# Patient Record
Sex: FEMALE | Race: WHITE | Employment: OTHER | URBAN - METROPOLITAN AREA
[De-identification: names, ages, dates, MRNs, and addresses within clinical notes are randomized per-mention and may not be internally consistent; named-entity substitution may affect disease eponyms.]

---

## 2017-05-03 ENCOUNTER — HOSPITAL ENCOUNTER (OUTPATIENT)
Dept: WOMENS IMAGING | Age: 77
Discharge: HOME | End: 2017-05-03
Attending: GENERAL PRACTICE | Admitting: GENERAL PRACTICE

## 2017-05-03 DIAGNOSIS — Z13.820 SCREENING FOR OSTEOPOROSIS: ICD-10-CM

## 2017-05-03 DIAGNOSIS — Z12.31 VISIT FOR SCREENING MAMMOGRAM: ICD-10-CM

## 2018-11-25 ENCOUNTER — APPOINTMENT (OUTPATIENT)
Dept: GENERAL RADIOLOGY | Age: 78
End: 2018-11-25
Payer: MEDICARE

## 2018-11-25 ENCOUNTER — HOSPITAL ENCOUNTER (EMERGENCY)
Age: 78
Discharge: HOME | End: 2018-11-25
Attending: EMERGENCY MEDICINE
Payer: MEDICARE

## 2018-11-25 ENCOUNTER — APPOINTMENT (OUTPATIENT)
Dept: CT IMAGING | Age: 78
End: 2018-11-25
Payer: MEDICARE

## 2018-11-25 VITALS
RESPIRATION RATE: 15 BRPM | DIASTOLIC BLOOD PRESSURE: 89 MMHG | HEART RATE: 70 BPM | BODY MASS INDEX: 17.83 KG/M2 | WEIGHT: 107 LBS | OXYGEN SATURATION: 95 % | TEMPERATURE: 98.2 F | SYSTOLIC BLOOD PRESSURE: 148 MMHG | HEIGHT: 65 IN

## 2018-11-25 DIAGNOSIS — R41.0 CONFUSION: Primary | ICD-10-CM

## 2018-11-25 LAB
ALBUMIN SERPL-MCNC: 4.1 GM/DL (ref 3.4–5)
ALCOHOL SCREEN SERUM: <0.01 %WT/VOL
ALP BLD-CCNC: 64 IU/L (ref 40–129)
ALT SERPL-CCNC: 36 U/L (ref 10–40)
AMMONIA: 19 UMOL/L (ref 11–51)
ANION GAP SERPL CALCULATED.3IONS-SCNC: 12 MMOL/L (ref 4–16)
AST SERPL-CCNC: 49 IU/L (ref 15–37)
BACTERIA: ABNORMAL /HPF
BASOPHILS ABSOLUTE: 0 K/CU MM
BASOPHILS RELATIVE PERCENT: 0.6 % (ref 0–1)
BILIRUB SERPL-MCNC: 0.1 MG/DL (ref 0–1)
BILIRUBIN URINE: NEGATIVE MG/DL
BLOOD, URINE: NEGATIVE
BUN BLDV-MCNC: 17 MG/DL (ref 6–23)
CALCIUM SERPL-MCNC: 8.6 MG/DL (ref 8.3–10.6)
CHLORIDE BLD-SCNC: 100 MMOL/L (ref 99–110)
CLARITY: CLEAR
CO2: 27 MMOL/L (ref 21–32)
COLOR: COLORLESS
CREAT SERPL-MCNC: 0.9 MG/DL (ref 0.6–1.1)
DIFFERENTIAL TYPE: ABNORMAL
EOSINOPHILS ABSOLUTE: 0 K/CU MM
EOSINOPHILS RELATIVE PERCENT: 0.2 % (ref 0–3)
GFR AFRICAN AMERICAN: >60 ML/MIN/1.73M2
GFR NON-AFRICAN AMERICAN: >60 ML/MIN/1.73M2
GLUCOSE BLD-MCNC: 96 MG/DL (ref 70–99)
GLUCOSE, URINE: NEGATIVE MG/DL
HCT VFR BLD CALC: 38.9 % (ref 37–47)
HEMOGLOBIN: 11.9 GM/DL (ref 12.5–16)
IMMATURE NEUTROPHIL %: 0.6 % (ref 0–0.43)
KETONES, URINE: NEGATIVE MG/DL
LEUKOCYTE ESTERASE, URINE: NEGATIVE
LYMPHOCYTES ABSOLUTE: 1.1 K/CU MM
LYMPHOCYTES RELATIVE PERCENT: 21.7 % (ref 24–44)
MCH RBC QN AUTO: 30.1 PG (ref 27–31)
MCHC RBC AUTO-ENTMCNC: 30.6 % (ref 32–36)
MCV RBC AUTO: 98.5 FL (ref 78–100)
MONOCYTES ABSOLUTE: 0.5 K/CU MM
MONOCYTES RELATIVE PERCENT: 9.7 % (ref 0–4)
NITRITE URINE, QUANTITATIVE: NEGATIVE
NUCLEATED RBC %: 0 %
PDW BLD-RTO: 14 % (ref 11.7–14.9)
PH, URINE: 7 (ref 5–8)
PLATELET # BLD: 245 K/CU MM (ref 140–440)
PMV BLD AUTO: 10.4 FL (ref 7.5–11.1)
POTASSIUM SERPL-SCNC: 3.3 MMOL/L (ref 3.5–5.1)
PROTEIN UA: NEGATIVE MG/DL
RBC # BLD: 3.95 M/CU MM (ref 4.2–5.4)
RBC URINE: <1 /HPF (ref 0–6)
SEGMENTED NEUTROPHILS ABSOLUTE COUNT: 3.5 K/CU MM
SEGMENTED NEUTROPHILS RELATIVE PERCENT: 67.2 % (ref 36–66)
SODIUM BLD-SCNC: 139 MMOL/L (ref 135–145)
SPECIFIC GRAVITY UA: 1 (ref 1–1.03)
SQUAMOUS EPITHELIAL: <1 /HPF
TOTAL IMMATURE NEUTOROPHIL: 0.03 K/CU MM
TOTAL NUCLEATED RBC: 0 K/CU MM
TOTAL PROTEIN: 7 GM/DL (ref 6.4–8.2)
TRANSITIONAL EPITHELIAL: <1 /HPF
TRICHOMONAS: ABNORMAL /HPF
UROBILINOGEN, URINE: NORMAL MG/DL (ref 0.2–1)
WBC # BLD: 5.3 K/CU MM (ref 4–10.5)
WBC UA: <1 /HPF (ref 0–5)

## 2018-11-25 PROCEDURE — G0480 DRUG TEST DEF 1-7 CLASSES: HCPCS

## 2018-11-25 PROCEDURE — 93010 ELECTROCARDIOGRAM REPORT: CPT | Performed by: INTERNAL MEDICINE

## 2018-11-25 PROCEDURE — 93005 ELECTROCARDIOGRAM TRACING: CPT | Performed by: EMERGENCY MEDICINE

## 2018-11-25 PROCEDURE — 99284 EMERGENCY DEPT VISIT MOD MDM: CPT

## 2018-11-25 PROCEDURE — 81001 URINALYSIS AUTO W/SCOPE: CPT

## 2018-11-25 PROCEDURE — 80053 COMPREHEN METABOLIC PANEL: CPT

## 2018-11-25 PROCEDURE — 82140 ASSAY OF AMMONIA: CPT

## 2018-11-25 PROCEDURE — 71045 X-RAY EXAM CHEST 1 VIEW: CPT

## 2018-11-25 PROCEDURE — 85025 COMPLETE CBC W/AUTO DIFF WBC: CPT

## 2018-11-25 PROCEDURE — 70450 CT HEAD/BRAIN W/O DYE: CPT

## 2018-11-25 RX ORDER — ORPHENADRINE CITRATE 30 MG/ML
30 INJECTION INTRAMUSCULAR; INTRAVENOUS EVERY 12 HOURS
Status: ON HOLD | COMMUNITY
End: 2022-05-28 | Stop reason: CLARIF

## 2018-11-25 RX ORDER — LEVOTHYROXINE SODIUM 0.05 MG/1
100 TABLET ORAL DAILY
Status: ON HOLD | COMMUNITY
End: 2022-05-28 | Stop reason: CLARIF

## 2018-11-25 RX ORDER — AMLODIPINE BESYLATE 5 MG/1
5 TABLET ORAL DAILY
Status: ON HOLD | COMMUNITY
End: 2022-05-28 | Stop reason: CLARIF

## 2018-11-25 NOTE — ED NOTES
.Discharge instructions given and reviewed. Signature obtained. Patient instructed to return if symptoms get worse or any new problems or discomforts arise. No further questions at this time.      Naomi Irizarry RN  11/25/18 1777

## 2018-11-25 NOTE — ED PROVIDER NOTES
the absence of a radiologist:  [x] Radiologist's Report Reviewed:    EKG (if obtained): (All EKG's are interpreted by myself in the absence of a cardiologist)  12 lead EKG as interpreted by me reveals normal sinus rhythm. LAD. There are no ischemic ST elevations or other suspicious ST changes;  QRS interval is narrow, QT interval is not prolonged. Final interpretation: Normal sinus rhythm. LAD. MDM:  Plan of care is discussed thoroughly with the patient and family if present. If performed, all imaging and lab work also discussed with patient. All relevant prior results and chart reviewed if available. Patient presents with changes in mental status, confusion. She is disoriented to time especially but has when her thought process and can carry on a normal conversation. She has no other acute complaints. Neurologic exam is unremarkable. Plan to evaluate for metabolic abnormalities, central neurologic process, infectious etiology. On reevaluation, the patient states that she is feeling better. No change in neurologic status. Vital signs are normal.  Metabolic workup is unremarkable. CT head without any acute abnormalities. No evidence of UTI. I discussed this case with Dr. Jayashree Santana this patient is wanting to go home. He states that outpatient workup is appropriate for this patient and she can follow-up with Dr. Kristopher Sanchez tomorrow. Patient agreeable with this plan of care. Clinical Impression:  1.  Confusion      (Please note that portions of this note may have been completed with a voice recognition program. Efforts were made to edit the dictations but occasionally words are mis-transcribed.)    Shawn Nicole MD, Shi Matthews MD  11/25/18 8159

## 2018-11-28 LAB
EKG ATRIAL RATE: 72 BPM
EKG DIAGNOSIS: NORMAL
EKG P AXIS: 42 DEGREES
EKG P-R INTERVAL: 158 MS
EKG Q-T INTERVAL: 412 MS
EKG QRS DURATION: 76 MS
EKG QTC CALCULATION (BAZETT): 451 MS
EKG R AXIS: -37 DEGREES
EKG T AXIS: 29 DEGREES
EKG VENTRICULAR RATE: 72 BPM

## 2019-01-16 ENCOUNTER — HOSPITAL ENCOUNTER (OUTPATIENT)
Age: 79
Discharge: HOME | End: 2019-01-16
Payer: MEDICARE

## 2020-10-06 ENCOUNTER — TELEPHONE (OUTPATIENT)
Dept: CARDIOLOGY CLINIC | Age: 80
End: 2020-10-06

## 2020-10-06 NOTE — TELEPHONE ENCOUNTER
Called patient to notify of STANT[facility] scheduled with Dr. Pedrito Monge for 10/15 @ 10 AM, arrival @ 9 AM.    Patient will come in for COVID and sign consents on Monday 10/12 @ 130 PM.    Patient voiced understanding.

## 2020-10-12 ENCOUNTER — TELEPHONE (OUTPATIENT)
Dept: CARDIOLOGY CLINIC | Age: 80
End: 2020-10-12

## 2020-10-12 NOTE — TELEPHONE ENCOUNTER
Called patient after missing COVID appointment for STANTON later this week. Patient states her  has double pneumonia and she is wanting to cancel until his gets better. Patient states she will call to reschedule. Notified Dr. Aleah Clifton and [address] scheduling.

## 2022-05-28 ENCOUNTER — APPOINTMENT (OUTPATIENT)
Dept: CT IMAGING | Age: 82
DRG: 287 | End: 2022-05-28
Payer: MEDICARE

## 2022-05-28 ENCOUNTER — HOSPITAL ENCOUNTER (INPATIENT)
Age: 82
LOS: 3 days | Discharge: HOME | DRG: 287 | End: 2022-05-31
Attending: EMERGENCY MEDICINE | Admitting: GENERAL PRACTICE
Payer: MEDICARE

## 2022-05-28 ENCOUNTER — APPOINTMENT (OUTPATIENT)
Dept: GENERAL RADIOLOGY | Age: 82
DRG: 287 | End: 2022-05-28
Payer: MEDICARE

## 2022-05-28 DIAGNOSIS — J90 PLEURAL EFFUSION: ICD-10-CM

## 2022-05-28 DIAGNOSIS — R79.89 ELEVATED BRAIN NATRIURETIC PEPTIDE (BNP) LEVEL: ICD-10-CM

## 2022-05-28 DIAGNOSIS — R77.8 TROPONIN LEVEL ELEVATED: ICD-10-CM

## 2022-05-28 DIAGNOSIS — R06.00 DYSPNEA, UNSPECIFIED TYPE: Primary | ICD-10-CM

## 2022-05-28 LAB
ALBUMIN SERPL-MCNC: 4 GM/DL (ref 3.4–5)
ALP BLD-CCNC: 70 IU/L (ref 40–129)
ALT SERPL-CCNC: 21 U/L (ref 10–40)
ANION GAP SERPL CALCULATED.3IONS-SCNC: 11 MMOL/L (ref 4–16)
APTT: 27.2 SECONDS (ref 25.1–37.1)
AST SERPL-CCNC: 29 IU/L (ref 15–37)
BASE EXCESS MIXED: 0.9 (ref 0–2.3)
BASOPHILS ABSOLUTE: 0.1 K/CU MM
BASOPHILS ABSOLUTE: 0.1 K/CU MM
BASOPHILS RELATIVE PERCENT: 0.6 % (ref 0–1)
BASOPHILS RELATIVE PERCENT: 0.6 % (ref 0–1)
BILIRUB SERPL-MCNC: 0.4 MG/DL (ref 0–1)
BUN BLDV-MCNC: 18 MG/DL (ref 6–23)
CALCIUM SERPL-MCNC: 8.9 MG/DL (ref 8.3–10.6)
CHLORIDE BLD-SCNC: 102 MMOL/L (ref 99–110)
CO2: 24 MMOL/L (ref 21–32)
COMMENT: ABNORMAL
CREAT SERPL-MCNC: 0.6 MG/DL (ref 0.6–1.1)
D DIMER: 242 NG/ML(DDU)
DIFFERENTIAL TYPE: ABNORMAL
DIFFERENTIAL TYPE: ABNORMAL
EOSINOPHILS ABSOLUTE: 0.1 K/CU MM
EOSINOPHILS ABSOLUTE: 0.1 K/CU MM
EOSINOPHILS RELATIVE PERCENT: 0.6 % (ref 0–3)
EOSINOPHILS RELATIVE PERCENT: 1.2 % (ref 0–3)
FERRITIN: 41 NG/ML (ref 15–150)
FOLATE: >20 NG/ML (ref 3.1–17.5)
GFR AFRICAN AMERICAN: >60 ML/MIN/1.73M2
GFR NON-AFRICAN AMERICAN: >60 ML/MIN/1.73M2
GLUCOSE BLD-MCNC: 122 MG/DL (ref 70–99)
HCO3 VENOUS: 27.5 MMOL/L (ref 19–25)
HCT VFR BLD CALC: 31.7 % (ref 37–47)
HCT VFR BLD CALC: 33 % (ref 37–47)
HEMOGLOBIN: 9.5 GM/DL (ref 12.5–16)
HEMOGLOBIN: 9.8 GM/DL (ref 12.5–16)
IMMATURE NEUTROPHIL %: 0.3 % (ref 0–0.43)
IMMATURE NEUTROPHIL %: 0.8 % (ref 0–0.43)
INR BLD: 0.89 INDEX
IRON: 25 UG/DL (ref 37–145)
LYMPHOCYTES ABSOLUTE: 0.9 K/CU MM
LYMPHOCYTES ABSOLUTE: 1.1 K/CU MM
LYMPHOCYTES RELATIVE PERCENT: 13.5 % (ref 24–44)
LYMPHOCYTES RELATIVE PERCENT: 9.8 % (ref 24–44)
MAGNESIUM: 2 MG/DL (ref 1.8–2.4)
MCH RBC QN AUTO: 26.4 PG (ref 27–31)
MCH RBC QN AUTO: 26.6 PG (ref 27–31)
MCHC RBC AUTO-ENTMCNC: 29.7 % (ref 32–36)
MCHC RBC AUTO-ENTMCNC: 30 % (ref 32–36)
MCV RBC AUTO: 88.1 FL (ref 78–100)
MCV RBC AUTO: 89.7 FL (ref 78–100)
MONOCYTES ABSOLUTE: 0.7 K/CU MM
MONOCYTES ABSOLUTE: 0.7 K/CU MM
MONOCYTES RELATIVE PERCENT: 8 % (ref 0–4)
MONOCYTES RELATIVE PERCENT: 9.1 % (ref 0–4)
NUCLEATED RBC %: 0 %
NUCLEATED RBC %: 0 %
O2 SAT, VEN: 76.8 % (ref 50–70)
PCO2, VEN: 51 MMHG (ref 38–52)
PCT TRANSFERRIN: 8 % (ref 10–44)
PDW BLD-RTO: 16.9 % (ref 11.7–14.9)
PDW BLD-RTO: 17 % (ref 11.7–14.9)
PH VENOUS: 7.34 (ref 7.32–7.42)
PLATELET # BLD: 339 K/CU MM (ref 140–440)
PLATELET # BLD: 339 K/CU MM (ref 140–440)
PMV BLD AUTO: 10.1 FL (ref 7.5–11.1)
PMV BLD AUTO: 10.5 FL (ref 7.5–11.1)
PO2, VEN: 49 MMHG (ref 28–48)
POTASSIUM SERPL-SCNC: 4.1 MMOL/L (ref 3.5–5.1)
PRO-BNP: 5932 PG/ML
PROTHROMBIN TIME: 11.5 SECONDS (ref 11.7–14.5)
RAPID INFLUENZA  B AGN: NEGATIVE
RAPID INFLUENZA A AGN: NEGATIVE
RBC # BLD: 3.6 M/CU MM (ref 4.2–5.4)
RBC # BLD: 3.68 M/CU MM (ref 4.2–5.4)
RETICULOCYTE COUNT PCT: 1.7 % (ref 0.2–2.2)
SARS-COV-2, NAAT: NOT DETECTED
SEGMENTED NEUTROPHILS ABSOLUTE COUNT: 5.9 K/CU MM
SEGMENTED NEUTROPHILS ABSOLUTE COUNT: 7.2 K/CU MM
SEGMENTED NEUTROPHILS RELATIVE PERCENT: 75.4 % (ref 36–66)
SEGMENTED NEUTROPHILS RELATIVE PERCENT: 80.1 % (ref 36–66)
SODIUM BLD-SCNC: 137 MMOL/L (ref 135–145)
SOURCE: NORMAL
T4 FREE: 2.36 NG/DL (ref 0.9–1.8)
TOTAL IMMATURE NEUTOROPHIL: 0.03 K/CU MM
TOTAL IMMATURE NEUTOROPHIL: 0.06 K/CU MM
TOTAL IRON BINDING CAPACITY: 307 UG/DL (ref 250–450)
TOTAL NUCLEATED RBC: 0 K/CU MM
TOTAL NUCLEATED RBC: 0 K/CU MM
TOTAL PROTEIN: 7.2 GM/DL (ref 6.4–8.2)
TROPONIN T: 0.03 NG/ML
TSH HIGH SENSITIVITY: 0.03 UIU/ML (ref 0.27–4.2)
UNSATURATED IRON BINDING CAPACITY: 282 UG/DL (ref 110–370)
VITAMIN B-12: >2000 PG/ML (ref 211–911)
WBC # BLD: 7.8 K/CU MM (ref 4–10.5)
WBC # BLD: 9 K/CU MM (ref 4–10.5)

## 2022-05-28 PROCEDURE — 6370000000 HC RX 637 (ALT 250 FOR IP): Performed by: INTERNAL MEDICINE

## 2022-05-28 PROCEDURE — 85045 AUTOMATED RETICULOCYTE COUNT: CPT

## 2022-05-28 PROCEDURE — 85730 THROMBOPLASTIN TIME PARTIAL: CPT

## 2022-05-28 PROCEDURE — 2580000003 HC RX 258: Performed by: INTERNAL MEDICINE

## 2022-05-28 PROCEDURE — 93005 ELECTROCARDIOGRAM TRACING: CPT | Performed by: EMERGENCY MEDICINE

## 2022-05-28 PROCEDURE — 83550 IRON BINDING TEST: CPT

## 2022-05-28 PROCEDURE — 82805 BLOOD GASES W/O2 SATURATION: CPT

## 2022-05-28 PROCEDURE — 80053 COMPREHEN METABOLIC PANEL: CPT

## 2022-05-28 PROCEDURE — 82728 ASSAY OF FERRITIN: CPT

## 2022-05-28 PROCEDURE — 93306 TTE W/DOPPLER COMPLETE: CPT

## 2022-05-28 PROCEDURE — 71046 X-RAY EXAM CHEST 2 VIEWS: CPT

## 2022-05-28 PROCEDURE — 87804 INFLUENZA ASSAY W/OPTIC: CPT

## 2022-05-28 PROCEDURE — 83540 ASSAY OF IRON: CPT

## 2022-05-28 PROCEDURE — 6370000000 HC RX 637 (ALT 250 FOR IP): Performed by: PHYSICIAN ASSISTANT

## 2022-05-28 PROCEDURE — 94640 AIRWAY INHALATION TREATMENT: CPT

## 2022-05-28 PROCEDURE — 85025 COMPLETE CBC W/AUTO DIFF WBC: CPT

## 2022-05-28 PROCEDURE — 82746 ASSAY OF FOLIC ACID SERUM: CPT

## 2022-05-28 PROCEDURE — 82607 VITAMIN B-12: CPT

## 2022-05-28 PROCEDURE — 6370000000 HC RX 637 (ALT 250 FOR IP): Performed by: EMERGENCY MEDICINE

## 2022-05-28 PROCEDURE — 99285 EMERGENCY DEPT VISIT HI MDM: CPT

## 2022-05-28 PROCEDURE — 85610 PROTHROMBIN TIME: CPT

## 2022-05-28 PROCEDURE — 87635 SARS-COV-2 COVID-19 AMP PRB: CPT

## 2022-05-28 PROCEDURE — 1200000000 HC SEMI PRIVATE

## 2022-05-28 PROCEDURE — 84439 ASSAY OF FREE THYROXINE: CPT

## 2022-05-28 PROCEDURE — 83735 ASSAY OF MAGNESIUM: CPT

## 2022-05-28 PROCEDURE — 6360000002 HC RX W HCPCS: Performed by: INTERNAL MEDICINE

## 2022-05-28 PROCEDURE — 84443 ASSAY THYROID STIM HORMONE: CPT

## 2022-05-28 PROCEDURE — 84484 ASSAY OF TROPONIN QUANT: CPT

## 2022-05-28 PROCEDURE — 71250 CT THORAX DX C-: CPT

## 2022-05-28 PROCEDURE — 83880 ASSAY OF NATRIURETIC PEPTIDE: CPT

## 2022-05-28 PROCEDURE — 94761 N-INVAS EAR/PLS OXIMETRY MLT: CPT

## 2022-05-28 PROCEDURE — 85379 FIBRIN DEGRADATION QUANT: CPT

## 2022-05-28 RX ORDER — AMLODIPINE BESYLATE AND BENAZEPRIL HYDROCHLORIDE 5; 20 MG/1; MG/1
1 CAPSULE ORAL DAILY
Status: DISCONTINUED | OUTPATIENT
Start: 2022-05-28 | End: 2022-05-28 | Stop reason: CLARIF

## 2022-05-28 RX ORDER — METHYLPREDNISOLONE SODIUM SUCCINATE 125 MG/2ML
125 INJECTION, POWDER, LYOPHILIZED, FOR SOLUTION INTRAMUSCULAR; INTRAVENOUS ONCE
Status: COMPLETED | OUTPATIENT
Start: 2022-05-28 | End: 2022-05-28

## 2022-05-28 RX ORDER — SODIUM CHLORIDE 0.9 % (FLUSH) 0.9 %
5-40 SYRINGE (ML) INJECTION PRN
Status: DISCONTINUED | OUTPATIENT
Start: 2022-05-28 | End: 2022-05-31 | Stop reason: SDUPTHER

## 2022-05-28 RX ORDER — ACETAMINOPHEN 325 MG/1
650 TABLET ORAL EVERY 6 HOURS PRN
Status: DISCONTINUED | OUTPATIENT
Start: 2022-05-28 | End: 2022-05-31 | Stop reason: SDUPTHER

## 2022-05-28 RX ORDER — ONDANSETRON 2 MG/ML
4 INJECTION INTRAMUSCULAR; INTRAVENOUS EVERY 6 HOURS PRN
Status: DISCONTINUED | OUTPATIENT
Start: 2022-05-28 | End: 2022-05-31 | Stop reason: HOSPADM

## 2022-05-28 RX ORDER — ASPIRIN 81 MG/1
324 TABLET, CHEWABLE ORAL ONCE
Status: COMPLETED | OUTPATIENT
Start: 2022-05-28 | End: 2022-05-28

## 2022-05-28 RX ORDER — SODIUM CHLORIDE 9 MG/ML
INJECTION, SOLUTION INTRAVENOUS PRN
Status: DISCONTINUED | OUTPATIENT
Start: 2022-05-28 | End: 2022-05-31 | Stop reason: SDUPTHER

## 2022-05-28 RX ORDER — ONDANSETRON 4 MG/1
4 TABLET, ORALLY DISINTEGRATING ORAL EVERY 8 HOURS PRN
Status: DISCONTINUED | OUTPATIENT
Start: 2022-05-28 | End: 2022-05-31 | Stop reason: HOSPADM

## 2022-05-28 RX ORDER — AMLODIPINE BESYLATE 5 MG/1
10 TABLET ORAL DAILY
Status: DISCONTINUED | OUTPATIENT
Start: 2022-05-28 | End: 2022-05-28

## 2022-05-28 RX ORDER — AMLODIPINE BESYLATE AND BENAZEPRIL HYDROCHLORIDE 5; 20 MG/1; MG/1
1 CAPSULE ORAL DAILY
Status: ON HOLD | COMMUNITY
End: 2022-05-30 | Stop reason: HOSPADM

## 2022-05-28 RX ORDER — ALBUTEROL SULFATE 90 UG/1
2 AEROSOL, METERED RESPIRATORY (INHALATION) 4 TIMES DAILY
Status: DISCONTINUED | OUTPATIENT
Start: 2022-05-29 | End: 2022-05-29

## 2022-05-28 RX ORDER — ALBUTEROL SULFATE 90 UG/1
2 AEROSOL, METERED RESPIRATORY (INHALATION) 4 TIMES DAILY
Status: DISCONTINUED | OUTPATIENT
Start: 2022-05-28 | End: 2022-05-28

## 2022-05-28 RX ORDER — ACETAMINOPHEN 650 MG/1
650 SUPPOSITORY RECTAL EVERY 6 HOURS PRN
Status: DISCONTINUED | OUTPATIENT
Start: 2022-05-28 | End: 2022-05-31 | Stop reason: SDUPTHER

## 2022-05-28 RX ORDER — POLYETHYLENE GLYCOL 3350 17 G/17G
17 POWDER, FOR SOLUTION ORAL DAILY PRN
Status: DISCONTINUED | OUTPATIENT
Start: 2022-05-28 | End: 2022-05-31 | Stop reason: HOSPADM

## 2022-05-28 RX ORDER — MELOXICAM 15 MG/1
15 TABLET ORAL DAILY
Status: ON HOLD | COMMUNITY
End: 2022-05-30 | Stop reason: HOSPADM

## 2022-05-28 RX ORDER — ALBUTEROL SULFATE 90 UG/1
2 AEROSOL, METERED RESPIRATORY (INHALATION) ONCE
Status: COMPLETED | OUTPATIENT
Start: 2022-05-28 | End: 2022-05-28

## 2022-05-28 RX ORDER — METHYLPREDNISOLONE SODIUM SUCCINATE 125 MG/2ML
60 INJECTION, POWDER, LYOPHILIZED, FOR SOLUTION INTRAMUSCULAR; INTRAVENOUS EVERY 8 HOURS
Status: DISCONTINUED | OUTPATIENT
Start: 2022-05-28 | End: 2022-05-28

## 2022-05-28 RX ORDER — LISINOPRIL 20 MG/1
20 TABLET ORAL DAILY
Status: DISCONTINUED | OUTPATIENT
Start: 2022-05-29 | End: 2022-05-29

## 2022-05-28 RX ORDER — AMLODIPINE BESYLATE 5 MG/1
5 TABLET ORAL DAILY
Status: DISCONTINUED | OUTPATIENT
Start: 2022-05-29 | End: 2022-05-29

## 2022-05-28 RX ORDER — CARVEDILOL 6.25 MG/1
6.25 TABLET ORAL 2 TIMES DAILY WITH MEALS
Status: DISCONTINUED | OUTPATIENT
Start: 2022-05-28 | End: 2022-05-30

## 2022-05-28 RX ORDER — ENOXAPARIN SODIUM 100 MG/ML
40 INJECTION SUBCUTANEOUS DAILY
Status: DISCONTINUED | OUTPATIENT
Start: 2022-05-28 | End: 2022-05-28 | Stop reason: DRUGHIGH

## 2022-05-28 RX ORDER — ATORVASTATIN CALCIUM 20 MG/1
20 TABLET, FILM COATED ORAL DAILY
COMMUNITY

## 2022-05-28 RX ORDER — FUROSEMIDE 10 MG/ML
20 INJECTION INTRAMUSCULAR; INTRAVENOUS ONCE
Status: COMPLETED | OUTPATIENT
Start: 2022-05-28 | End: 2022-05-28

## 2022-05-28 RX ORDER — SODIUM CHLORIDE 0.9 % (FLUSH) 0.9 %
5-40 SYRINGE (ML) INJECTION EVERY 12 HOURS SCHEDULED
Status: DISCONTINUED | OUTPATIENT
Start: 2022-05-28 | End: 2022-05-31 | Stop reason: SDUPTHER

## 2022-05-28 RX ORDER — ENOXAPARIN SODIUM 100 MG/ML
30 INJECTION SUBCUTANEOUS DAILY
Status: DISCONTINUED | OUTPATIENT
Start: 2022-05-28 | End: 2022-05-31 | Stop reason: HOSPADM

## 2022-05-28 RX ORDER — MELOXICAM 7.5 MG/1
15 TABLET ORAL DAILY
Status: DISCONTINUED | OUTPATIENT
Start: 2022-05-29 | End: 2022-05-28

## 2022-05-28 RX ORDER — LOSARTAN POTASSIUM 25 MG/1
25 TABLET ORAL DAILY
Status: DISCONTINUED | OUTPATIENT
Start: 2022-05-28 | End: 2022-05-28

## 2022-05-28 RX ORDER — CLONIDINE HYDROCHLORIDE 0.1 MG/1
0.1 TABLET ORAL EVERY 4 HOURS PRN
Status: DISCONTINUED | OUTPATIENT
Start: 2022-05-28 | End: 2022-05-28

## 2022-05-28 RX ORDER — ATORVASTATIN CALCIUM 10 MG/1
20 TABLET, FILM COATED ORAL DAILY
Status: DISCONTINUED | OUTPATIENT
Start: 2022-05-28 | End: 2022-05-31 | Stop reason: HOSPADM

## 2022-05-28 RX ORDER — IPRATROPIUM BROMIDE AND ALBUTEROL SULFATE 2.5; .5 MG/3ML; MG/3ML
1 SOLUTION RESPIRATORY (INHALATION) 4 TIMES DAILY
Status: DISCONTINUED | OUTPATIENT
Start: 2022-05-28 | End: 2022-05-28

## 2022-05-28 RX ADMIN — FUROSEMIDE 20 MG: 10 INJECTION, SOLUTION INTRAMUSCULAR; INTRAVENOUS at 14:33

## 2022-05-28 RX ADMIN — ENOXAPARIN SODIUM 30 MG: 100 INJECTION SUBCUTANEOUS at 21:53

## 2022-05-28 RX ADMIN — CARVEDILOL 6.25 MG: 6.25 TABLET, FILM COATED ORAL at 18:31

## 2022-05-28 RX ADMIN — ASPIRIN 324 MG: 81 TABLET, CHEWABLE ORAL at 12:05

## 2022-05-28 RX ADMIN — Medication 2 PUFF: at 12:27

## 2022-05-28 RX ADMIN — ALBUTEROL SULFATE 2 PUFF: 90 AEROSOL, METERED RESPIRATORY (INHALATION) at 12:27

## 2022-05-28 RX ADMIN — METHYLPREDNISOLONE SODIUM SUCCINATE 125 MG: 125 INJECTION, POWDER, LYOPHILIZED, FOR SOLUTION INTRAMUSCULAR; INTRAVENOUS at 14:34

## 2022-05-28 RX ADMIN — IPRATROPIUM BROMIDE AND ALBUTEROL SULFATE 1 AMPULE: .5; 3 SOLUTION RESPIRATORY (INHALATION) at 20:06

## 2022-05-28 RX ADMIN — ATORVASTATIN CALCIUM 20 MG: 10 TABLET, FILM COATED ORAL at 21:52

## 2022-05-28 RX ADMIN — METHYLPREDNISOLONE SODIUM SUCCINATE 60 MG: 125 INJECTION, POWDER, FOR SOLUTION INTRAMUSCULAR; INTRAVENOUS at 21:53

## 2022-05-28 RX ADMIN — AMLODIPINE BESYLATE 10 MG: 5 TABLET ORAL at 14:23

## 2022-05-28 RX ADMIN — SODIUM CHLORIDE, PRESERVATIVE FREE 10 ML: 5 INJECTION INTRAVENOUS at 21:54

## 2022-05-28 ASSESSMENT — ENCOUNTER SYMPTOMS
EYES NEGATIVE: 1
SHORTNESS OF BREATH: 1
ALLERGIC/IMMUNOLOGIC NEGATIVE: 1
GASTROINTESTINAL NEGATIVE: 1

## 2022-05-28 NOTE — H&P
H&P DICTATED: [id number]. Pt seen and examined. Labs, imaging, and noted reviewed. Agree with above.      Abraham Gallegos MD.

## 2022-05-28 NOTE — PROGRESS NOTES
LOVENOX PROPHYLAXIS EVALUATION    Wt Readings from Last 3 Encounters:   05/28/22 94 lb 9.6 oz (42.9 kg)   11/25/18 107 lb (48.5 kg)       Estimated Creatinine Clearance: 51 mL/min (based on SCr of 0.6 mg/dL). Recent Labs     05/28/22  1112 05/28/22  1112 05/28/22  1439   BUN 18  --   --    CREATININE 0.6  --   --       < > 339   HGB 9.8*   < > 9.5*   HCT 33.0*   < > 31.7*   INR 0.89  --   --     < > = values in this interval not displayed.        Weight Range (kg): 50.9 kg and below    CRCL = 30 or greater     50.9 kg   and below     .9  kg   101-150.9 kg   151-174.9  kg   175 kg  or greater     30mg subq  daily     40mg subq daily    (or 30mg subq BID orthopedic)     30mg subq  BID   40mg subq  BID   60mg subq BID       Per P/T protocol for appropriate subq anticoagulation by weight and CRCL change to:  Enoxparin 30mg subq daily    Paola Virgen, [facility]  5:53 PM  05/28/22

## 2022-05-28 NOTE — ED PROVIDER NOTES
[facility]      TRIAGE CHIEF COMPLAINT:   Shortness of Breath      [place]:  Juarez Hernandez is a 80 y.o. female that presents with complaint of shortness of breath. Patient also states she felt lightheaded this morning almost passed out but did not. She states she feels better now. She had sudden onset of shortness of breath denies any chest pain per se, no nausea vomiting no diarrhea no abdominal pain no back pain no swelling that is new she does have swelling to both of her legs. She has never seen a cardiologist.  Denies any heart problems or other lung problems. Denies any history of smoking. No other questions or concerns. REVIEW OF SYSTEMS:  At least 10 systems reviewed and otherwise acutely negative except as in the [address] Review of Systems   Constitutional: Positive for fatigue. HENT: Negative. Eyes: Negative. Respiratory: Positive for shortness of breath. Cardiovascular: Negative. Gastrointestinal: Negative. Endocrine: Negative. Genitourinary: Negative. Musculoskeletal: Negative. Skin: Negative. Allergic/Immunologic: Negative. Neurological: Positive for light-headedness. Hematological: Negative. Psychiatric/Behavioral: Negative. All other systems reviewed and are negative. Past Medical History:   Diagnosis Date    Arthritis     Hypertension      Past Surgical History:   Procedure Laterality Date    CARPAL TUNNEL RELEASE      COSMETIC SURGERY       History reviewed. No pertinent family history.   Social History     Socioeconomic History    Marital status:      Spouse name: Not on file    Number of children: Not on file    Years of education: Not on file    Highest education level: Not on file   Occupational History    Not on file   Tobacco Use    Smoking status: Never Smoker    Smokeless tobacco: Current User   Substance and Sexual Activity    Alcohol use: No    Drug use: No    Sexual activity: Not on file Other Topics Concern    Not on file   Social History Narrative    Not on file     Social Determinants of Health     Financial Resource Strain:     Difficulty of Paying Living Expenses: Not on file   Food Insecurity:     Worried About Running Out of Food in the Last Year: Not on file    Yue of Food in the Last Year: Not on file   Transportation Needs:     Lack of Transportation (Medical): Not on file    Lack of Transportation (Non-Medical): Not on file   Physical Activity:     Days of Exercise per Week: Not on file    Minutes of Exercise per Session: Not on file   Stress:     Feeling of Stress : Not on file   Social Connections:     Frequency of Communication with Friends and Family: Not on file    Frequency of Social Gatherings with Friends and Family: Not on file    Attends Mandaeism Services: Not on file    Active Member of [address] [place] [address] or Organizations: Not on file    Attends Club or Organization Meetings: Not on file    Marital Status: Not on file   Intimate Partner Violence:     Fear of Current or Ex-Partner: Not on file    Emotionally Abused: Not on file    Physically Abused: Not on file    Sexually Abused: Not on file   Housing Stability:     Unable to Pay for Housing in the Last Year: Not on file    Number of [place] in the Last Year: Not on file    Unstable Housing in the Last Year: Not on file     No current facility-administered medications for this encounter. Current Outpatient Medications   Medication Sig Dispense Refill    levothyroxine (SYNTHROID) 50 MCG tablet Take 100 mcg by mouth Daily      amLODIPine (NORVASC) 5 MG tablet Take 5 mg by mouth daily      orphenadrine (NORFLEX) 30 MG/ML injection Inject 30 mg into the muscle every 12 hours        Allergies   Allergen Reactions    Ceclor [Cefaclor] Hives     No current facility-administered medications for this encounter.      Current Outpatient Medications   Medication Sig Dispense Refill    levothyroxine (SYNTHROID) 50 MCG tablet Take 100 mcg by mouth Daily      amLODIPine (NORVASC) 5 MG tablet Take 5 mg by mouth daily      orphenadrine (NORFLEX) 30 MG/ML injection Inject 30 mg into the muscle every 12 hours         Nursing Notes Reviewed    VITAL SIGNS:  ED Triage Vitals [05/28/22 1025]   Enc Vitals Group      BP (!) 159/98      Heart Rate 90      Resp 18      Temp 97.9 °F (36.6 °C)      Temp src       SpO2 97 %      Weight       Height       Head Circumference       Peak Flow       Pain Score       Pain Loc       Pain Edu? Excl. in [address]? PHYSICAL EXAM:  Physical Exam  Vitals and nursing note reviewed. Constitutional:       General: She is not in acute distress. Appearance: Normal appearance. She is well-developed and well-groomed. She is not ill-appearing, toxic-appearing or diaphoretic. HENT:      Head: Normocephalic and atraumatic. Right Ear: External ear normal.      Left Ear: External ear normal.      Nose: No congestion. Eyes:      General: No scleral icterus. Right eye: No discharge. Left eye: No discharge. Extraocular Movements: Extraocular movements intact. Conjunctiva/sclera: Conjunctivae normal.   Cardiovascular:      Rate and Rhythm: Normal rate and regular rhythm. Pulses: Normal pulses. Heart sounds: Normal heart sounds. No murmur heard. No friction rub. No gallop. Pulmonary:      Effort: Pulmonary effort is normal. No respiratory distress. Breath sounds: No stridor. Wheezing present. No rales. Abdominal:      General: Bowel sounds are normal. There is no distension. Palpations: Abdomen is soft. There is no mass. Tenderness: There is no abdominal tenderness. There is no guarding or rebound. Negative signs include Haji's sign, Rovsing's sign and McBurney's sign. Hernia: No hernia is present. Musculoskeletal:         General: Swelling present. No tenderness, deformity or signs of injury. Normal range of motion.       Cervical back: Normal range of motion. No rigidity. Right lower leg: Edema present. Left lower leg: Edema present. Skin:     General: Skin is warm. Coloration: Skin is not jaundiced or pale. Findings: No bruising, erythema, lesion or rash. Neurological:      General: No focal deficit present. Mental Status: She is alert and oriented to person, place, and time. GCS: GCS eye subscore is 4. GCS verbal subscore is 5. GCS motor subscore is 6. Cranial Nerves: Cranial nerves are intact. No cranial nerve deficit, dysarthria or facial asymmetry. Sensory: Sensation is intact. No sensory deficit. Motor: Motor function is intact. No weakness, tremor, atrophy, abnormal muscle tone or seizure activity. Coordination: Coordination normal.   Psychiatric:         Mood and Affect: Mood normal.         Behavior: Behavior normal.         Thought Content:  Thought content normal.         Judgment: Judgment normal.           I have reviewed andinterpreted all of the currently available lab results from this visit (if applicable):    Results for orders placed or performed during the hospital encounter of 05/28/22   Rapid Flu Swab    Specimen: Nasopharyngeal   Result Value Ref Range    Rapid Influenza A Ag NEGATIVE NEGATIVE    Rapid Influenza B Ag NEGATIVE NEGATIVE   CBC with Auto Differential   Result Value Ref Range    WBC 9.0 4.0 - 10.5 K/CU MM    RBC 3.68 (L) 4.2 - 5.4 M/CU MM    Hemoglobin 9.8 (L) 12.5 - 16.0 GM/DL    Hematocrit 33.0 (L) 37 - 47 %    MCV 89.7 78 - 100 FL    MCH 26.6 (L) 27 - 31 PG    MCHC 29.7 (L) 32.0 - 36.0 %    RDW 16.9 (H) 11.7 - 14.9 %    Platelets 833 452 - 999 K/CU MM    MPV 10.1 7.5 - 11.1 FL    Differential Type AUTOMATED DIFFERENTIAL     Segs Relative 80.1 (H) 36 - 66 %    Lymphocytes % 9.8 (L) 24 - 44 %    Monocytes % 8.0 (H) 0 - 4 %    Eosinophils % 1.2 0 - 3 %    Basophils % 0.6 0 - 1 %    Segs Absolute 7.2 K/CU MM    Lymphocytes Absolute 0.9 K/CU MM    Monocytes Absolute 0.7 K/CU MM    Eosinophils Absolute 0.1 K/CU MM    Basophils Absolute 0.1 K/CU MM    Nucleated RBC % 0.0 %    Total Nucleated RBC 0.0 K/CU MM    Total Immature Neutrophil 0.03 K/CU MM    Immature Neutrophil % 0.3 0 - 0.43 %   Comprehensive Metabolic Panel   Result Value Ref Range    Sodium 137 135 - 145 MMOL/L    Potassium 4.1 3.5 - 5.1 MMOL/L    Chloride 102 99 - 110 mMol/L    CO2 24 21 - 32 MMOL/L    BUN 18 6 - 23 MG/DL    CREATININE 0.6 0.6 - 1.1 MG/DL    Glucose 122 (H) 70 - 99 MG/DL    Calcium 8.9 8.3 - 10.6 MG/DL    Albumin 4.0 3.4 - 5.0 GM/DL    Total Protein 7.2 6.4 - 8.2 GM/DL    Total Bilirubin 0.4 0.0 - 1.0 MG/DL    ALT 21 10 - 40 U/L    AST 29 15 - 37 IU/L    Alkaline Phosphatase 70 40 - 129 IU/L    GFR Non-African American >60 >60 mL/min/1.73m2    GFR African American >60 >60 mL/min/1.73m2    Anion Gap 11 4 - 16   Blood Gas, Venous   Result Value Ref Range    pH, Kike 7.34 7.32 - 7.42    pCO2, Kike 51 38 - 52 mmHG    pO2, Kike 49 (H) 28 - 48 mmHG    Base Exc, Mixed . 9 0 - 2.3    HCO3, Venous 27.5 (H) 19 - 25 MMOL/L    O2 Sat, Kike 76.8 (H) 50 - 70 %    Comment VBG    Brain Natriuretic Peptide   Result Value Ref Range    Pro-BNP 5,932 (H) <300 PG/ML   Troponin   Result Value Ref Range    Troponin T 0.025 (H) <0.01 NG/ML   EKG 12 Lead   Result Value Ref Range    Ventricular Rate 92 BPM    Atrial Rate 92 BPM    P-R Interval 150 ms    QRS Duration 92 ms    Q-T Interval 394 ms    QTc Calculation (Bazett) 487 ms    P Axis 47 degrees    R Axis -4 degrees    T Axis 73 degrees    Diagnosis       Sinus rhythm with premature atrial complexes  Possible Left atrial enlargement  Left ventricular hypertrophy  Abnormal ECG  When compared with ECG of 25-NOV-2018 11:52,  premature atrial complexes are now present          Radiographs (if obtained):  [] The following radiograph was interpreted by myself in the absence of a radiologist:  [x] Radiologist's Report Reviewed:    XR CHEST (2 VW)    Result Date: 5/28/2022  EXAMINATION: TWO XRAY VIEWS OF THE CHEST 5/28/2022 10:48 am COMPARISON: 11/25/2018 HISTORY: ORDERING SYSTEM PROVIDED HISTORY: SOB TECHNOLOGIST PROVIDED HISTORY: Reason for exam:->SOB Reason for Exam: SOB FINDINGS: The cardiomediastinal silhouette is stable and the lungs are clear. There is small right pleural effusion. There is hyperinflation consistent with chronic obstructive pulmonary disease. COPD. Small right pleural effusion. Recommend follow-up imaging to confirm resolution       EKG (if obtained): (All EKG's are interpreted by myself in the absence of a cardiologist)    12 lead EKG per my interpretation:  Normal Sinus Rhythm 92 PAC's  Axis is   Normal  QTc is  487  There is no specific T wave changes appreciated. There is no specific ST wave changes appreciated. Prior EKG to compare with was not available     MDM:    Patient here with sudden onset of shortness of breath today. Patient states she is at home has had onset of shortness of breath she states she almost passed out but did not. She is feeling better now. She has no chest pain or abdominal pain or back pain she is otherwise currently asymptomatic. She denies any history of heart or lung issues. Denies any smoking. X-ray showed pleural effusion possible COPD I gave her breathing treatments. BNP and troponin level were elevated on lab work I do not have an old to compare to. EKG is negative. On reevaluation she is doing well vital signs are stable she has no current symptoms she is at rest.  I did talk to her family doctor on-call will admit for ACS rule out given shortness of breath lightheaded, near syncope and elevated cardiac enzymes. She otherwise appears well she is resting comfortably smiling laughing I doubt she has DVT PE AAA. No distress.     CLINICAL IMPRESSION:  Final diagnoses:   Dyspnea, unspecified type   Elevated brain natriuretic peptide (BNP) level   Troponin level elevated   Pleural effusion (Please note that portions of this note may have been completed with a voice recognition program. Efforts were made to edit the dictations but occasionally words aremis-transcribed.)    DISPOSITION REFERRAL (if applicable):  No follow-up provider specified.     DISPOSITION MEDICATIONS (if applicable):  New Prescriptions    No medications on file          Erica Kumar, [address] [place] [address],   05/28/22 2414

## 2022-05-28 NOTE — CONSULTS
[address] [place] [address], [address] [facility]                                  CONSULTATION    PATIENT NAME: Jovani Rouse                      :        1940  MED REC NO:   [id number]                          ROOM:       ED29  ACCOUNT NO:   [de-identified]                           ADMIT DATE: 2022  PROVIDER:     Brad Stevens MD    CONSULT DATE:  2022    INDICATION:  Heart failure. HISTORY OF PRESENT ILLNESS:  This is a 68-year-old white female patient,  patient of Dr. Biju Paul. The patient comes in with having shortness of  breath present. She states for the last 24 to 48 hours she has been  short of breath. She has been out of her blood pressure medication for  a month and just started back yesterday. She is on Lotrel at home. She  _____ was scheduled to see outpatient also. She has not followed up  also for that. She comes in with having shortness of breath right now. HOME MEDICATIONS:  She is on Lotrel and Synthroid. PAST MEDICAL HISTORY:  History of having hypertension present, arthritis  and hypothyroidism present. PAST SURGICAL HISTORY:  Carpal tunnel surgery. SOCIAL HISTORY:  Does not smoke. Does not drink. ALLERGIES:  Damian Shana. PHYSICAL EXAMINATION:  GENERAL:  The patient is awake, alert, and answering questions, not in  acute distress. VITAL SIGNS:  Temperature afebrile. Pulse is 74. Blood pressure is  162/92. HEENT:  Head is normocephalic and atraumatic. Pupils are equal and  reactive. CHEST:  Equal expansion. LUNGS:  Clear to auscultation. No wheezing or rhonchi appreciated. HEART:  Regular rate and rhythm. She has 3/6 systolic ejection murmur  present. ABDOMEN:  Soft and nontender. Bowel sounds are present. No  hepatosplenomegaly or guarding appreciated. EXTREMITIES:  No cyanosis or clubbing noted. NEUROLOGIC:  Cranial nerves II through II are grossly intact.     DIAGNOSTIC DATA: EKG is sinus rhythm. No acute ST changes noted. Chest x-ray shows small pleural effusion noted. LABORATORY DATA:  BUN is 18, creatinine 0.6. BNP is 3900. Troponins  negative. LFTs are normal.  CBC is within normal range. IMPRESSION:  This is a 65-year-old female patient who comes in with  having shortness of breath present and elevated blood pressure. She has  a history of hypertension. PLAN:  At this time, the plan is to get her blood pressure controlled  and we will make further recommendations based on hospital course. We  will obtain echo also.  She has cardiac murmur for AS and MR Edwige Elder MD    D: 05/28/2022 13:52:12       T: 05/28/2022 15:37:20     [email or web address]  Job#: [id number]     Doc#: [id number]    CC:

## 2022-05-28 NOTE — CONSULTS
Dictated --[id number]  HTN start on med  Check echo has a murmur    Electronically signed by Kayla Cruz MD on 5/28/22 at 2:33 PM EDT

## 2022-05-29 LAB
ANION GAP SERPL CALCULATED.3IONS-SCNC: 16 MMOL/L (ref 4–16)
BUN BLDV-MCNC: 23 MG/DL (ref 6–23)
CALCIUM SERPL-MCNC: 8.8 MG/DL (ref 8.3–10.6)
CHLORIDE BLD-SCNC: 100 MMOL/L (ref 99–110)
CHOLESTEROL, FASTING: 146 MG/DL
CO2: 24 MMOL/L (ref 21–32)
CREAT SERPL-MCNC: 0.6 MG/DL (ref 0.6–1.1)
EKG ATRIAL RATE: 92 BPM
EKG DIAGNOSIS: NORMAL
EKG P AXIS: 47 DEGREES
EKG P-R INTERVAL: 150 MS
EKG Q-T INTERVAL: 394 MS
EKG QRS DURATION: 92 MS
EKG QTC CALCULATION (BAZETT): 487 MS
EKG R AXIS: -4 DEGREES
EKG T AXIS: 73 DEGREES
EKG VENTRICULAR RATE: 92 BPM
GFR AFRICAN AMERICAN: >60 ML/MIN/1.73M2
GFR NON-AFRICAN AMERICAN: >60 ML/MIN/1.73M2
GLUCOSE BLD-MCNC: 137 MG/DL (ref 70–99)
HCT VFR BLD CALC: 33.1 % (ref 37–47)
HDLC SERPL-MCNC: 57 MG/DL
HEMOGLOBIN: 10.3 GM/DL (ref 12.5–16)
LDL CHOLESTEROL CALCULATED: 79 MG/DL
MCH RBC QN AUTO: 27 PG (ref 27–31)
MCHC RBC AUTO-ENTMCNC: 31.1 % (ref 32–36)
MCV RBC AUTO: 86.6 FL (ref 78–100)
PDW BLD-RTO: 16.9 % (ref 11.7–14.9)
PLATELET # BLD: 347 K/CU MM (ref 140–440)
PMV BLD AUTO: 10.2 FL (ref 7.5–11.1)
POTASSIUM SERPL-SCNC: 4.1 MMOL/L (ref 3.5–5.1)
PRO-BNP: 6678 PG/ML
RBC # BLD: 3.82 M/CU MM (ref 4.2–5.4)
SODIUM BLD-SCNC: 140 MMOL/L (ref 135–145)
TRIGLYCERIDE, FASTING: 52 MG/DL
WBC # BLD: 6.5 K/CU MM (ref 4–10.5)

## 2022-05-29 PROCEDURE — 94761 N-INVAS EAR/PLS OXIMETRY MLT: CPT

## 2022-05-29 PROCEDURE — 85027 COMPLETE CBC AUTOMATED: CPT

## 2022-05-29 PROCEDURE — 36415 COLL VENOUS BLD VENIPUNCTURE: CPT

## 2022-05-29 PROCEDURE — 6370000000 HC RX 637 (ALT 250 FOR IP): Performed by: PHYSICIAN ASSISTANT

## 2022-05-29 PROCEDURE — 6360000002 HC RX W HCPCS: Performed by: INTERNAL MEDICINE

## 2022-05-29 PROCEDURE — 80061 LIPID PANEL: CPT

## 2022-05-29 PROCEDURE — 83880 ASSAY OF NATRIURETIC PEPTIDE: CPT

## 2022-05-29 PROCEDURE — 1200000000 HC SEMI PRIVATE

## 2022-05-29 PROCEDURE — 80048 BASIC METABOLIC PNL TOTAL CA: CPT

## 2022-05-29 PROCEDURE — 84484 ASSAY OF TROPONIN QUANT: CPT

## 2022-05-29 PROCEDURE — 2580000003 HC RX 258: Performed by: INTERNAL MEDICINE

## 2022-05-29 PROCEDURE — 6370000000 HC RX 637 (ALT 250 FOR IP): Performed by: INTERNAL MEDICINE

## 2022-05-29 PROCEDURE — 94640 AIRWAY INHALATION TREATMENT: CPT

## 2022-05-29 PROCEDURE — 93010 ELECTROCARDIOGRAM REPORT: CPT | Performed by: INTERNAL MEDICINE

## 2022-05-29 PROCEDURE — 6370000000 HC RX 637 (ALT 250 FOR IP): Performed by: GENERAL PRACTICE

## 2022-05-29 RX ORDER — ASPIRIN 81 MG/1
81 TABLET, CHEWABLE ORAL DAILY
Status: DISCONTINUED | OUTPATIENT
Start: 2022-05-29 | End: 2022-05-31 | Stop reason: HOSPADM

## 2022-05-29 RX ORDER — LEVOTHYROXINE SODIUM 137 UG/1
137 TABLET ORAL DAILY
Status: ON HOLD | COMMUNITY
End: 2022-05-30 | Stop reason: HOSPADM

## 2022-05-29 RX ORDER — LISINOPRIL 5 MG/1
10 TABLET ORAL DAILY
Status: DISCONTINUED | OUTPATIENT
Start: 2022-05-30 | End: 2022-05-30

## 2022-05-29 RX ORDER — FUROSEMIDE 20 MG/1
20 TABLET ORAL DAILY
Status: DISCONTINUED | OUTPATIENT
Start: 2022-05-29 | End: 2022-05-31 | Stop reason: HOSPADM

## 2022-05-29 RX ORDER — ALBUTEROL SULFATE 90 UG/1
2 AEROSOL, METERED RESPIRATORY (INHALATION) EVERY 4 HOURS PRN
Status: DISCONTINUED | OUTPATIENT
Start: 2022-05-29 | End: 2022-05-31 | Stop reason: HOSPADM

## 2022-05-29 RX ADMIN — Medication 2 PUFF: at 11:34

## 2022-05-29 RX ADMIN — CARVEDILOL 6.25 MG: 6.25 TABLET, FILM COATED ORAL at 17:22

## 2022-05-29 RX ADMIN — FUROSEMIDE 20 MG: 20 TABLET ORAL at 13:21

## 2022-05-29 RX ADMIN — ALBUTEROL SULFATE 2 PUFF: 90 AEROSOL, METERED RESPIRATORY (INHALATION) at 15:11

## 2022-05-29 RX ADMIN — ENOXAPARIN SODIUM 30 MG: 100 INJECTION SUBCUTANEOUS at 10:32

## 2022-05-29 RX ADMIN — CARVEDILOL 6.25 MG: 6.25 TABLET, FILM COATED ORAL at 10:31

## 2022-05-29 RX ADMIN — SODIUM CHLORIDE, PRESERVATIVE FREE 10 ML: 5 INJECTION INTRAVENOUS at 20:21

## 2022-05-29 RX ADMIN — ALBUTEROL SULFATE 2 PUFF: 90 AEROSOL, METERED RESPIRATORY (INHALATION) at 07:35

## 2022-05-29 RX ADMIN — Medication 2 PUFF: at 07:35

## 2022-05-29 RX ADMIN — ASPIRIN 81 MG 81 MG: 81 TABLET ORAL at 13:21

## 2022-05-29 RX ADMIN — SODIUM CHLORIDE, PRESERVATIVE FREE 10 ML: 5 INJECTION INTRAVENOUS at 10:31

## 2022-05-29 RX ADMIN — ALBUTEROL SULFATE 2 PUFF: 90 AEROSOL, METERED RESPIRATORY (INHALATION) at 11:34

## 2022-05-29 RX ADMIN — ATORVASTATIN CALCIUM 20 MG: 10 TABLET, FILM COATED ORAL at 20:20

## 2022-05-29 RX ADMIN — Medication 2 PUFF: at 15:11

## 2022-05-29 NOTE — PROGRESS NOTES
Daily Progress Note      patient is awake alert feeling better  No chest pain this am  Remain in sinus and BP stable  Severe AS and moderate to severe MS  Plan for right and left heart cath as outpatient  Possible home later today if BP ok  Keep on ACEI and coreg stop Lotrel  Hyperthyroid due to meds -adjust meds   Ambulate in halls if not symptomatic   Ok for home with a close follow up    Echo-- Summary   Left ventricular systolic function is normal.   Moderately dilated left atrium. Heavily calcified aortic valve with severe aortic stenosis; mean P   mmHG. SHAUNA: 0.5 cm2. DVI: 0.2 . SHAUNA indexed for BSA: 0.39 cm2/m2. MIld aortic regurgitation. Large mitral annular calcification; moderate to severe mitral stenosis. Mean   PmmHg. Severe mitral regurgitation. Moderate tricuspid regurgitation; RVSP: 46 mmHg. No evidence of any pericardial effusion. Atherosclerotic plaque noted in the abdominal aorta. Objective:   /73   Pulse 88   Temp 98.1 °F (36.7 °C) (Oral)   Resp 17   Ht 5' 5\" (1.651 m)   Wt 94 lb 9.6 oz (42.9 kg)   SpO2 94%   BMI 15.74 kg/m²   No intake or output data in the 24 hours ending 22 1141    Medications:   Scheduled Meds:   [START ON 2022] lisinopril  10 mg Oral Daily    sodium chloride flush  5-40 mL IntraVENous 2 times per day    carvedilol  6.25 mg Oral BID WC    atorvastatin  20 mg Oral Daily    enoxaparin  30 mg SubCUTAneous Daily    albuterol sulfate HFA  2 puff Inhalation 4x daily    ipratropium  2 puff Inhalation 4x daily      Infusions:   sodium chloride        PRN Meds:  sodium chloride flush, sodium chloride, ondansetron **OR** ondansetron, polyethylene glycol, acetaminophen **OR** acetaminophen       Physical Exam:  Vitals:    22 1028   BP:    Pulse: 88   Resp:    Temp:    SpO2:         General: awake alert   Chest: Nontender  Cardiac: sinus   Lungs:Clear to auscultation and percussion.   Abdomen: Soft, NT, ND, +BS  Extremities: no edema   Vascular:  Equal 2+ peripheral pulses. Lab Data:  CBC:   Recent Labs     05/28/22  1112 05/28/22  1439 05/29/22  0234   WBC 9.0 7.8 6.5   HGB 9.8* 9.5* 10.3*   HCT 33.0* 31.7* 33.1*   MCV 89.7 88.1 86.6    339 347     BMP:   Recent Labs     05/28/22  1112 05/29/22  0234    140   K 4.1 4.1    100   CO2 24 24   BUN 18 23   CREATININE 0.6 0.6     LIVER PROFILE:   Recent Labs     05/28/22  1112   AST 29   ALT 21   BILITOT 0.4   ALKPHOS 70     PT/INR:   Recent Labs     05/28/22  1112   PROTIME 11.5*   INR 0.89     APTT:   Recent Labs     05/28/22  1112   APTT 27.2     BNP:  No results for input(s): BNP in the last 72 hours.       Assessment:  Patient Active Problem List    Diagnosis Date Noted    Dyspnea 05/28/2022       Joel Hastings MD, MD 5/29/2022 11:41 AM

## 2022-05-29 NOTE — H&P
[address] [place] [address], [address] [place] [address]                              HISTORY AND PHYSICAL    PATIENT NAME: Jesus Tracey                      :        1940  MED REC NO:   [id number]                          ROOM:       3008  ACCOUNT NO:   [de-identified]                           ADMIT DATE: 2022  PROVIDER:     NEHEMIAH Kendall    HISTORY OF PRESENT ILLNESS:  This is a pleasant 74-year-old female who  had presented to the emergency room at [facility] complaining of dyspnea and exertional chest tenderness x1 day. The patient was seen and examined in the emergency room. The patient  had already been evaluated by Cardiology, who noted a permanent heart  murmur. So, an echocardiogram was ordered, which indicated severe  mitral valve regurgitation and severe aortic stenosis. At the time of  evaluation, the patient denies any fever, cough, palpitations, abdominal  pain, nausea, emesis, jaw/neck pain, arm/shoulder pain, any history of  smoking, hematuria, hematochezia. All other review of systems are  unremarkable. PAST MEDICAL HISTORY:  Significant for hypertension, hypothyroidism. PAST SURGICAL HISTORY:  The patient denies any past surgical history. SOCIAL HISTORY:  The patient's past social history is negative for any  tobacco use, ethanol use, or illicit drug use. CURRENT MEDICATIONS:  The patient's home medications include amlodipine  5 mg one tablet p.o. daily, levothyroxine 100 mcg one tablet p.o. daily,  orphenadrine 30 mg intravascularly. ALLERGIES:  The patient's drug allergies include CEFACLOR, which causes  urticaria otherwise no other known drug allergies, no other known food  allergies, and no known allergies to latex.     PHYSICAL EXAMINATION:  GENERAL:  Vital signs as of  today includes a blood pressure of  162/90, pulse of 83, respiratory rate of 18, temperature is 97.9 degrees  Fahrenheit, oxygen saturation 97% on room air. GENERAL APPEARANCE:  Well-appearing, in no acute distress, sitting  comfortably in her emergency room examination table. HEAD:  Atraumatic, normocephalic. NECK:  Supple. Full range of motion. LYMPH NODES:  There are no enlarged or tender preauricular,  postauricular, anterior cervical, posterior cervical, deep cervical,  submandibular or submental lymph nodes appreciated on palpation. HEART:  Grade 3/6 systolic murmur appreciated over the second  intercostal space and down the left sternal border. Otherwise, regular  rate and rhythm and no other rubs or murmurs. LUNGS:  Clear to auscultation bilaterally with no wheezes, rhonchi or  rales. ABDOMEN:  Soft, nontender, and nondistended abdomen with normoactive  bowel sounds on auscultation. EXTREMITIES:  On exposed areas, there is no erythema, edema or cyanosis. PERIPHERAL VASCULAR:  Lower extremity pulses are 2+ throughout. TESTING AND IMAGING RESULTS: COVID-19 and flu screening were negative. Echocardiogram  indicates moderate-to-severe mitral valve stenosis, mild aortic valve  regurgitation, and a heavily calcified aortic valve with severe aortic  stenosis. TSH was 0.032, T4 was 2.36. Hemoglobin was 9.5. D-dimer was 242. Magnesium was 2.0. PT/INR is within normal limits. Rapid flu was  negative. NSR with a rate of 92 beats per minute on EKG. Fasting blood  glucose of 122, otherwise CMP was within normal limits. Troponin of  0.025. BNP was 5932. Hemoglobin was 9.8 otherwise CBC was within  normal limits. COPD with small right pleural effusion. Recommend followup imaging to  confirm resolution on chest x-ray. ASSESSMENT AND PLAN:  1. Dyspnea/exertional chest pain/significant valvular heart disease  including severe aortic stenosis/ACS rule out. The patient had  presented complaining of dyspnea and exertional chest pain. The patient  noted to have notable murmur.   On exam BNP of 5932 and a troponin of  0.025. On echocardiogram, the patient was noted to have a heavily  calcified aortic valve with some severe aortic valve stenosis, mild  aortic valve regurgitation and then marked-to-severe mitral valve  stenosis. Admitting the patient for observation and to trend troponin  along other testing to rule to ACS. Appreciate Cardiology consult. 2.  COPD/small right pleural effusion. Chest x-ray performed on  05/28/2022, the patient was noted to have COPD and a small right pleural  effusion. More followup imaging was recommended to confirm resolution  at this time. CT chest without contrast was pending. Flu and COVID-19  testing has been negative. 3.  Hypertension. Continue Carvedilol, losartan and amlodipine. 4.  Hyperlipidemia. Continue atorvastatin. 5.  Hypothyroidism. Holding levothyroxine due to thyroid panel results. This patient was examined and treated by Enrique Jackson PA-C  under the supervision of Baljit Humphreys MD.        Antonette Montaño PA-C    D: 05/28/2022 17:18:40       T: 05/29/2022 0:06:55     [email or web address]  Job#: [id number]     Doc#: [id number]    CC:  NEHEMIAH Bustillos    Pt seen and examined. Labs, imaging, and noted reviewed. Agree with above.      Baljit Humphreys MD.

## 2022-05-29 NOTE — PROGRESS NOTES
INTERNAL MEDICINE PROGRESS NOTE        Bernie Anna   1940   Primary Care Physician:  Kameron Pickard Date: 5/28/2022     Subjective:   Patient reports she is feeling better. Patient denies any new chest pains or dyspnea since yesterday. Also discussed CT chest findings of rib fractures with the patient in detail. Patient initially denies any recent falls, but after additional questions, patient does report she tripped by one of her windows inside the house last week and then fell impacting her chest into the wall. Patient does not seem to be an overly reliable historian, but for her most recent story regarding this potential fall, she did report acute tenderness of her entire anterior thorax when she fell into the wall when she tripped, but denies seeking any medical attention at that time. Patient denies any rib pain at this time. Denies chest pain, SOB, nausea, vomiting, abdominal pain. Remainder of ROS is unremarkable. Meds, labs and other notes reviewed. 05/28/2020 failed CT chest without contrast:  Impression   1. Acute, minimally displaced fracture involving the posteromedial left 5th   rib, without evidence of a pneumothorax   2. Subacute, healing fractures involving the posteromedial left 6 through   11th ribs   3. Small bilateral pleural effusions   4. Scar versus atelectasis present within the lung bases bilaterally   5. Cardiomegaly, with coronary arterial calcifications and dense   calcification of the mitral valve annulus   6. Emphysematous changes present throughout the lungs         Objective:   /78   Pulse 81   Temp 97.8 °F (36.6 °C) (Oral)   Resp 18   Ht 5' 5\" (1.651 m)   Wt 94 lb 9.6 oz (42.9 kg)   SpO2 95%   BMI 15.74 kg/m²  No results for input(s): POCGLU in the last 72 hours. No intake/output data recorded. No intake/output data recorded.     Neck: no adenopathy and supple, symmetrical, trachea midline  Lungs: clear to auscultation bilaterally  Heart: Grade 3 out of 6 systolic murmur appreciated over the right second intercostal space and on the left sternal border. Regular rate and rhythm. No other murmurs, rubs, or gallops. Abdomen: soft, non-tender; bowel sounds normal; no masses,  no organomegaly  Extremities: extremities normal, atraumatic, no cyanosis or edema  Neurologic: Grossly normal    Data Review  CBC with Differential:  Recent Labs     05/28/22  1112 05/28/22  1439 05/29/22  0234   WBC 9.0 7.8 6.5   RBC 3.68* 3.60* 3.82*   HGB 9.8* 9.5* 10.3*   HCT 33.0* 31.7* 33.1*    339 347   MCV 89.7 88.1 86.6   MCH 26.6* 26.4* 27.0   MCHC 29.7* 30.0* 31.1*   RDW 16.9* 17.0* 16.9*   SEGSPCT 80.1* 75.4*  --    LYMPHOPCT 9.8* 13.5*  --    MONOPCT 8.0* 9.1*  --    BASOPCT 0.6 0.6  --    MONOSABS 0.7 0.7  --    LYMPHSABS 0.9 1.1  --    EOSABS 0.1 0.1  --    BASOSABS 0.1 0.1  --    DIFFTYPE AUTOMATED DIFFERENTIAL AUTOMATED DIFFERENTIAL  --      CMP:    Recent Labs     05/28/22  1112 05/29/22  0234    140   K 4.1 4.1    100   CO2 24 24   BUN 18 23   CREATININE 0.6 0.6   GFRAA >60 >60   LABGLOM >60 >60   GLUCOSE 122* 137*   PROT 7.2  --    LABALBU 4.0  --    CALCIUM 8.9 8.8   BILITOT 0.4  --    ALKPHOS 70  --    AST 29  --    ALT 21  --      PT/INR:    Recent Labs     05/28/22 1112   PROTIME 11.5*   INR 0.89     Meds:    sodium chloride flush  5-40 mL IntraVENous 2 times per day    carvedilol  6.25 mg Oral BID WC    atorvastatin  20 mg Oral Daily    enoxaparin  30 mg SubCUTAneous Daily    amLODIPine  5 mg Oral Daily    And    lisinopril  20 mg Oral Daily    albuterol sulfate HFA  2 puff Inhalation 4x daily    ipratropium  2 puff Inhalation 4x daily     PRN Meds: sodium chloride flush, sodium chloride, ondansetron **OR** ondansetron, polyethylene glycol, acetaminophen **OR** acetaminophen    Assessment/Plan:   1.   Dyspnea/exertional chest pain/significant valvular heart disease  including severe aortic stenosis/ACS rule out.  The patient had  presented complaining of dyspnea and exertional chest pain. The patient  noted to have notable murmur on exam. BNP is 6678 up from 5932. Troponin of  0.025 on 05/28/2022. On 05/28/2022 echocardiogram, the patient was noted to have a heavily  calcified aortic valve with some severe aortic valve stenosis, mild  aortic valve regurgitation and then marked-to-severe mitral valve  stenosis. Appreciate Cardiology consult and awaiting recommendations. 2.  COPD/small right pleural effusion. On chest x-ray performed on  05/28/2022, the patient was noted to have COPD and a small right pleural  effusion. Emphysematous changes present throughout the lungs on 05/28/2022 chest CT. Patient continues to deny any personal history of smoking and denies any history of secondhand smoke as well. Continuing to monitor and continuing DuoNeb. Holding Solu-Medrol at this time. 3.  Acute minimally displaced fracture of the posterior medial left fifth rib without pneumothorax and subacute healing fractures of the posterior medial left 6 through 11 ribs/fall last week per patient. On 05/28/2022 CT chest, patient was noted to have these rib fractures. Patient denies any pain at this time. Patient does not appear to be fully reliable historian initially denies any recent falls. However, she does eventually report that she did trip inside her house (unsure what she tripped over) sometime last week where she fell into the wall impacting her anterior thorax on the left side and noted sensitive tenderness which has since resolved. Continuing to monitor. 4.  Hypertension. Continue Carvedilol, losartan and amlodipine. 5.  Hyperlipidemia. Continue atorvastatin. 6.  Hypothyroidism. Holding levothyroxine due to thyroid panel results.      This patient was examined and treated by Matti Beckwith PA-C under the supervision of Dr. Gerardo Bond MD.     I spent at least 38 minutes reviewing pt's record, previous notes, other providers' notes, labs, diagnostic imaging, and documenting in Epic in addition to face-to-face time I spent with the pt. Lova Councilman, PA-C  5/29/2022 8:41 AM     Pt seen and examined. Labs, imaging, and noted reviewed. Agree with above.      Victoriano Curtis MD.

## 2022-05-29 NOTE — PLAN OF CARE
Problem: Discharge Planning  Goal: Discharge to home or other facility with appropriate resources  Outcome: Progressing  Flowsheets (Taken 5/29/2022 1028)  Discharge to home or other facility with appropriate resources: Identify barriers to discharge with patient and caregiver     Problem: Safety - Adult  Goal: Free from fall injury  Outcome: Progressing

## 2022-05-29 NOTE — RT PROTOCOL NOTE
order mode. 4-6 - enter or revise RT Bronchodilator order(s) to two equivalent RT bronchodilator orders with one order with BID Frequency and one order with Frequency of every 4 hours PRN wheezing or increased work of breathing using Per Protocol order mode. 7-10 - enter or revise RT Bronchodilator order(s) to two equivalent RT bronchodilator orders with one order with TID Frequency and one order with Frequency of every 4 hours PRN wheezing or increased work of breathing using Per Protocol order mode. 11-13 - enter or revise RT Bronchodilator order(s) to one equivalent RT bronchodilator order with QID Frequency and an Albuterol order with Frequency of every 4 hours PRN wheezing or increased work of breathing using Per Protocol order mode. Greater than 13 - enter or revise RT Bronchodilator order(s) to one equivalent RT bronchodilator order with every 4 hours Frequency and an Albuterol order with Frequency of every 2 hours PRN wheezing or increased work of breathing using Per Protocol order mode. RT to enter RT Home Evaluation for COPD & MDI Assessment order using Per Protocol order mode.     Electronically signed by Stephanie Ayers RCP on 5/29/2022 at 5:01 PM

## 2022-05-30 PROBLEM — I35.0 NONRHEUMATIC AORTIC VALVE STENOSIS: Chronic | Status: ACTIVE | Noted: 2022-05-30

## 2022-05-30 PROBLEM — I34.2 NONRHEUMATIC MITRAL VALVE STENOSIS: Chronic | Status: ACTIVE | Noted: 2022-05-30

## 2022-05-30 PROCEDURE — 1200000000 HC SEMI PRIVATE

## 2022-05-30 PROCEDURE — 6370000000 HC RX 637 (ALT 250 FOR IP): Performed by: PHYSICIAN ASSISTANT

## 2022-05-30 PROCEDURE — 6370000000 HC RX 637 (ALT 250 FOR IP): Performed by: INTERNAL MEDICINE

## 2022-05-30 PROCEDURE — 6360000002 HC RX W HCPCS: Performed by: INTERNAL MEDICINE

## 2022-05-30 PROCEDURE — 94761 N-INVAS EAR/PLS OXIMETRY MLT: CPT

## 2022-05-30 PROCEDURE — 2580000003 HC RX 258: Performed by: INTERNAL MEDICINE

## 2022-05-30 RX ORDER — LISINOPRIL 5 MG/1
5 TABLET ORAL DAILY
Qty: 30 TABLET | Refills: 3 | Status: SHIPPED | OUTPATIENT
Start: 2022-05-31 | End: 2022-05-31 | Stop reason: HOSPADM

## 2022-05-30 RX ORDER — CARVEDILOL 6.25 MG/1
6.25 TABLET ORAL 2 TIMES DAILY WITH MEALS
Qty: 60 TABLET | Refills: 3 | Status: SHIPPED | OUTPATIENT
Start: 2022-05-30 | End: 2022-05-31 | Stop reason: HOSPADM

## 2022-05-30 RX ORDER — LEVOTHYROXINE SODIUM 0.1 MG/1
100 TABLET ORAL DAILY
Qty: 30 TABLET | Refills: 5 | Status: SHIPPED | OUTPATIENT
Start: 2022-05-30

## 2022-05-30 RX ORDER — LISINOPRIL 10 MG/1
10 TABLET ORAL DAILY
Qty: 30 TABLET | Refills: 3 | Status: SHIPPED | OUTPATIENT
Start: 2022-05-30 | End: 2022-05-30 | Stop reason: HOSPADM

## 2022-05-30 RX ORDER — FUROSEMIDE 20 MG/1
20 TABLET ORAL DAILY
Qty: 60 TABLET | Refills: 3 | Status: SHIPPED | OUTPATIENT
Start: 2022-05-30

## 2022-05-30 RX ORDER — ASPIRIN 81 MG/1
81 TABLET, CHEWABLE ORAL DAILY
Qty: 30 TABLET | Refills: 3 | Status: SHIPPED | OUTPATIENT
Start: 2022-05-30

## 2022-05-30 RX ORDER — LISINOPRIL 5 MG/1
5 TABLET ORAL DAILY
Status: DISCONTINUED | OUTPATIENT
Start: 2022-05-31 | End: 2022-05-30

## 2022-05-30 RX ORDER — METOPROLOL TARTRATE 50 MG/1
50 TABLET, FILM COATED ORAL 2 TIMES DAILY
Status: DISCONTINUED | OUTPATIENT
Start: 2022-05-30 | End: 2022-05-31

## 2022-05-30 RX ADMIN — LISINOPRIL 10 MG: 5 TABLET ORAL at 09:58

## 2022-05-30 RX ADMIN — METOPROLOL TARTRATE 50 MG: 50 TABLET, FILM COATED ORAL at 22:34

## 2022-05-30 RX ADMIN — ATORVASTATIN CALCIUM 20 MG: 10 TABLET, FILM COATED ORAL at 22:34

## 2022-05-30 RX ADMIN — CARVEDILOL 6.25 MG: 6.25 TABLET, FILM COATED ORAL at 10:01

## 2022-05-30 RX ADMIN — METOPROLOL TARTRATE 50 MG: 50 TABLET, FILM COATED ORAL at 12:15

## 2022-05-30 RX ADMIN — ASPIRIN 81 MG 81 MG: 81 TABLET ORAL at 09:59

## 2022-05-30 RX ADMIN — SODIUM CHLORIDE, PRESERVATIVE FREE 10 ML: 5 INJECTION INTRAVENOUS at 09:59

## 2022-05-30 RX ADMIN — ENOXAPARIN SODIUM 30 MG: 100 INJECTION SUBCUTANEOUS at 09:59

## 2022-05-30 RX ADMIN — FUROSEMIDE 20 MG: 20 TABLET ORAL at 09:59

## 2022-05-30 NOTE — PROGRESS NOTES
INTERNAL MEDICINE PROGRESS NOTE        Isaiah Tejada   1940   Primary Care Physician:  Elsy Gleason Date: 5/28/2022     Subjective:   Patient reports she is feeling better. Denies chest pain, SOB, nausea, vomiting, abdominal pain. Remainder of ROS is unremarkable. Meds, labs and other notes reviewed. As per cardiology patient can be discharged home. She will need a follow-up with cardiology in a week. 05/28/2020 failed CT chest without contrast:  Impression   1. Acute, minimally displaced fracture involving the posteromedial left 5th   rib, without evidence of a pneumothorax   2. Subacute, healing fractures involving the posteromedial left 6 through   11th ribs   3. Small bilateral pleural effusions   4. Scar versus atelectasis present within the lung bases bilaterally   5. Cardiomegaly, with coronary arterial calcifications and dense   calcification of the mitral valve annulus   6. Emphysematous changes present throughout the lungs         Objective:   /82   Pulse 67   Temp 98 °F (36.7 °C) (Oral)   Resp 18   Ht 5' 5\" (1.651 m)   Wt 99 lb (44.9 kg)   SpO2 98%   BMI 16.47 kg/m²  No results for input(s): POCGLU in the last 72 hours. I/O last 3 completed shifts: In: 10 [I.V.:10]  Out: -   No intake/output data recorded. Neck: no adenopathy and supple, symmetrical, trachea midline  Lungs: clear to auscultation bilaterally  Heart: Grade 3 out of 6 systolic murmur appreciated over the right second intercostal space and on the left sternal border. Regular rate and rhythm. No other murmurs, rubs, or gallops.   Abdomen: soft, non-tender; bowel sounds normal; no masses,  no organomegaly  Extremities: extremities normal, atraumatic, no cyanosis or edema  Neurologic: Grossly normal    Data Review  CBC with Differential:    Recent Labs     05/28/22  1112 05/28/22  1439 05/29/22  0234   WBC 9.0 7.8 6.5   RBC 3.68* 3.60* 3.82*   HGB 9.8* 9.5* 10.3*   HCT 33.0* 31.7* 33.1*   PLT 339 339 347   MCV 89.7 88.1 86.6   MCH 26.6* 26.4* 27.0   MCHC 29.7* 30.0* 31.1*   RDW 16.9* 17.0* 16.9*   SEGSPCT 80.1* 75.4*  --    LYMPHOPCT 9.8* 13.5*  --    MONOPCT 8.0* 9.1*  --    BASOPCT 0.6 0.6  --    MONOSABS 0.7 0.7  --    LYMPHSABS 0.9 1.1  --    EOSABS 0.1 0.1  --    BASOSABS 0.1 0.1  --    DIFFTYPE AUTOMATED DIFFERENTIAL AUTOMATED DIFFERENTIAL  --      CMP:    Recent Labs     05/28/22  1112 05/29/22  0234    140   K 4.1 4.1    100   CO2 24 24   BUN 18 23   CREATININE 0.6 0.6   GFRAA >60 >60   LABGLOM >60 >60   GLUCOSE 122* 137*   PROT 7.2  --    LABALBU 4.0  --    CALCIUM 8.9 8.8   BILITOT 0.4  --    ALKPHOS 70  --    AST 29  --    ALT 21  --      PT/INR:    Recent Labs     05/28/22  1112   PROTIME 11.5*   INR 0.89     Meds:    metoprolol tartrate  50 mg Oral BID    aspirin  81 mg Oral Daily    furosemide  20 mg Oral Daily    sodium chloride flush  5-40 mL IntraVENous 2 times per day    atorvastatin  20 mg Oral Daily    enoxaparin  30 mg SubCUTAneous Daily     PRN Meds: albuterol sulfate HFA, ipratropium, sodium chloride flush, sodium chloride, ondansetron **OR** ondansetron, polyethylene glycol, acetaminophen **OR** acetaminophen    Assessment/Plan:   1. Dyspnea/exertional chest pain/significant valvular heart disease  including severe aortic stenosis/ACS rule out. The patient had  presented complaining of dyspnea and exertional chest pain. The patient  noted to have notable murmur on exam. BNP is 6678 up from 5932. Troponin of  0.025 on 05/28/2022. On 05/28/2022 echocardiogram, the patient was noted to have a heavily  calcified aortic valve with some severe aortic valve stenosis, mild  aortic valve regurgitation and then marked-to-severe mitral valve  stenosis. Appreciate Cardiology consult and awaiting recommendations. 2.  COPD/small right pleural effusion. On chest x-ray performed on  05/28/2022, the patient was noted to have COPD and a small right pleural  effusion. Emphysematous changes present throughout the lungs on 05/28/2022 chest CT. Patient continues to deny any personal history of smoking and denies any history of secondhand smoke as well. Continuing to monitor and continuing DuoNeb. 3.  Acute minimally displaced fracture of the posterior medial left fifth rib without pneumothorax and subacute healing fractures of the posterior medial left 6 through 11 ribs/fall last week per patient. On 05/28/2022 CT chest, patient was noted to have these rib fractures. Patient denies any pain at this time. Patient does not appear to be fully reliable historian initially denies any recent falls. However, she does eventually report that she did trip inside her house (unsure what she tripped over) sometime last week where she fell into the wall impacting her anterior thorax on the left side and noted sensitive tenderness which has since resolved. Continuing to monitor. 4.  Hypertension. Continue Carvedilol, losartan and amlodipine. 5.  Hyperlipidemia. Continue atorvastatin. 6.  Hypothyroidism. Holding levothyroxine due to thyroid panel results. Upon discharge we will lower the dose on levothyroxine. Addendum:  Pt was seen by Dr Lolly Malcolm. Pt is dizzy and lightheadedness. She also became tachycardic on ambulation. DC on hold. Will undergo cardiac cath tomorrow.           Linda Ambrosio MD-  5/30/2022 6:22 PM

## 2022-05-30 NOTE — PROGRESS NOTES
Daily Progress Note  Awake and alert; feeling fair  Denies any Cp or SOB  NSR on tele, BP stable  Increase activity  If tolerating activity stable for d/c from cardiac standpoint  F/u in office in 1 week      She is dizzy and light headed with activity-heart rate elevated into 130s with exertion   Hold d/c for now   Plan for cath tomorrow right and left   Adjust meds-keep SBP around 150-=-stop ACEI keep on lopressor only   Severe AS and moderate MS and normal EF    Severe Valvular disease    Shown on Echo    Severe Aortic stenosis    Mean pressure gradient 70 mmHg    Moderate to severe mitral stenosis    Mean pressure gradient 6 mmHg    Well compensated, Denies any CP or SOB    On Coreg and Ace, Tolerating well    Continue lasix, she denies SOB, however elevated BNP    Will need Left and Right heart cath outpatient    Hyperthyroid    Elevated Free T4    Treatment per primary      Echo-- Summary   Left ventricular systolic function is normal.   Moderately dilated left atrium.   Heavily calcified aortic valve with severe aortic stenosis; mean P   mmHG. SHAUNA: 0.5 cm2. DVI: 0.2 . SHAUNA indexed for BSA: 0.39 cm2/m2.   MIld aortic regurgitation.   Large mitral annular calcification; moderate to severe mitral stenosis. Mean   PmmHg.   Severe mitral regurgitation.   Moderate tricuspid regurgitation; RVSP: 46 mmHg.   No evidence of any pericardial effusion.   Atherosclerotic plaque noted in the abdominal aorta    PAST MEDICAL HISTORY:  History of having hypertension present, arthritis  and hypothyroidism present.     PAST SURGICAL HISTORY:  Carpal tunnel surgery.     SOCIAL HISTORY:  Does not smoke. Does not drink.     ALLERGIES:  CECLOR.   Objective:   /83   Pulse 80   Temp 97.6 °F (36.4 °C) (Oral)   Resp 17   Ht 5' 5\" (1.651 m)   Wt 99 lb (44.9 kg)   SpO2 94%   BMI 16.47 kg/m²     Intake/Output Summary (Last 24 hours) at 2022 0913  Last data filed at 2022  Gross per 24 hour   Intake 10 ml   Output --   Net 10 ml       Medications:   Scheduled Meds:   lisinopril  10 mg Oral Daily    aspirin  81 mg Oral Daily    furosemide  20 mg Oral Daily    sodium chloride flush  5-40 mL IntraVENous 2 times per day    carvedilol  6.25 mg Oral BID WC    atorvastatin  20 mg Oral Daily    enoxaparin  30 mg SubCUTAneous Daily      Infusions:   sodium chloride        PRN Meds:  albuterol sulfate HFA, ipratropium, sodium chloride flush, sodium chloride, ondansetron **OR** ondansetron, polyethylene glycol, acetaminophen **OR** acetaminophen       Physical Exam:  Vitals:    05/30/22 0256   BP: 125/83   Pulse:    Resp: 17   Temp: 97.6 °F (36.4 °C)   SpO2: 94%        General: AAO, NAD  Chest: Nontender  Cardiac: First and Second Heart Sounds are Normal, systolic murmur   Lungs:Clear to auscultation and percussion. Abdomen: Soft, NT, ND, +BS  Extremities: No clubbing, no edema  Vascular:  Equal 2+ peripheral pulses. Lab Data:  CBC:   Recent Labs     05/28/22  1112 05/28/22  1439 05/29/22  0234   WBC 9.0 7.8 6.5   HGB 9.8* 9.5* 10.3*   HCT 33.0* 31.7* 33.1*   MCV 89.7 88.1 86.6    339 347     BMP:   Recent Labs     05/28/22  1112 05/29/22  0234    140   K 4.1 4.1    100   CO2 24 24   BUN 18 23   CREATININE 0.6 0.6     LIVER PROFILE:   Recent Labs     05/28/22  1112   AST 29   ALT 21   BILITOT 0.4   ALKPHOS 70     PT/INR:   Recent Labs     05/28/22  1112   PROTIME 11.5*   INR 0.89     APTT:   Recent Labs     05/28/22  1112   APTT 27.2     BNP:  No results for input(s): BNP in the last 72 hours.       Assessment:  Patient Active Problem List    Diagnosis Date Noted    Dyspnea 05/28/2022       Electronically signed by SUHAIL Dc CNP on 5/30/2022 at 9:13 AM    Electronically signed by Tan Jacobs MD on 5/30/22 at 1:16 PM EDT

## 2022-05-31 VITALS
HEART RATE: 68 BPM | OXYGEN SATURATION: 96 % | WEIGHT: 92 LBS | TEMPERATURE: 97.7 F | SYSTOLIC BLOOD PRESSURE: 112 MMHG | DIASTOLIC BLOOD PRESSURE: 63 MMHG | HEIGHT: 65 IN | RESPIRATION RATE: 17 BRPM | BODY MASS INDEX: 15.33 KG/M2

## 2022-05-31 PROBLEM — I35.0 SEVERE AORTIC VALVE STENOSIS: Status: ACTIVE | Noted: 2022-05-31

## 2022-05-31 LAB
ANION GAP SERPL CALCULATED.3IONS-SCNC: 11 MMOL/L (ref 4–16)
BASE EXCESS MIXED: 3.5 (ref 0–2.3)
BASOPHILS ABSOLUTE: 0 K/CU MM
BASOPHILS RELATIVE PERCENT: 0.6 % (ref 0–1)
BUN BLDV-MCNC: 24 MG/DL (ref 6–23)
CALCIUM SERPL-MCNC: 8.8 MG/DL (ref 8.3–10.6)
CARBON MONOXIDE, BLOOD: 1.9 % (ref 0–5)
CHLORIDE BLD-SCNC: 100 MMOL/L (ref 99–110)
CO2 CONTENT: 30.5 MMOL/L (ref 19–24)
CO2: 26 MMOL/L (ref 21–32)
COMMENT: ABNORMAL
CREAT SERPL-MCNC: 0.5 MG/DL (ref 0.6–1.1)
DIFFERENTIAL TYPE: ABNORMAL
EOSINOPHILS ABSOLUTE: 0.1 K/CU MM
EOSINOPHILS RELATIVE PERCENT: 1.9 % (ref 0–3)
ESTIMATED AVERAGE GLUCOSE: 140 MG/DL
GFR AFRICAN AMERICAN: >60 ML/MIN/1.73M2
GFR NON-AFRICAN AMERICAN: >60 ML/MIN/1.73M2
GLUCOSE BLD-MCNC: 94 MG/DL (ref 70–99)
HBA1C MFR BLD: 6.5 % (ref 4.2–6.3)
HCO3 ARTERIAL: 29.1 MMOL/L (ref 18–23)
HCT VFR BLD CALC: 33.8 % (ref 37–47)
HEMOGLOBIN: 10.3 GM/DL (ref 12.5–16)
IMMATURE NEUTROPHIL %: 0.3 % (ref 0–0.43)
LYMPHOCYTES ABSOLUTE: 1.5 K/CU MM
LYMPHOCYTES RELATIVE PERCENT: 20.4 % (ref 24–44)
MCH RBC QN AUTO: 26.4 PG (ref 27–31)
MCHC RBC AUTO-ENTMCNC: 30.5 % (ref 32–36)
MCV RBC AUTO: 86.7 FL (ref 78–100)
METHEMOGLOBIN ARTERIAL: 1.5 %
MONOCYTES ABSOLUTE: 0.9 K/CU MM
MONOCYTES RELATIVE PERCENT: 11.8 % (ref 0–4)
NUCLEATED RBC %: 0 %
O2 SATURATION: 94.3 % (ref 96–97)
PCO2 ARTERIAL: 47 MMHG (ref 32–45)
PDW BLD-RTO: 16.9 % (ref 11.7–14.9)
PH BLOOD: 7.4 (ref 7.34–7.45)
PLATELET # BLD: 344 K/CU MM (ref 140–440)
PMV BLD AUTO: 10.5 FL (ref 7.5–11.1)
PO2 ARTERIAL: 82 MMHG (ref 75–100)
POTASSIUM SERPL-SCNC: 4.2 MMOL/L (ref 3.5–5.1)
RBC # BLD: 3.9 M/CU MM (ref 4.2–5.4)
SEGMENTED NEUTROPHILS ABSOLUTE COUNT: 4.7 K/CU MM
SEGMENTED NEUTROPHILS RELATIVE PERCENT: 65 % (ref 36–66)
SODIUM BLD-SCNC: 137 MMOL/L (ref 135–145)
TOTAL IMMATURE NEUTOROPHIL: 0.02 K/CU MM
TOTAL NUCLEATED RBC: 0 K/CU MM
WBC # BLD: 7.2 K/CU MM (ref 4–10.5)

## 2022-05-31 PROCEDURE — 83036 HEMOGLOBIN GLYCOSYLATED A1C: CPT

## 2022-05-31 PROCEDURE — 93456 R HRT CORONARY ARTERY ANGIO: CPT

## 2022-05-31 PROCEDURE — 6360000002 HC RX W HCPCS

## 2022-05-31 PROCEDURE — B24BZZ4 ULTRASONOGRAPHY OF HEART WITH AORTA, TRANSESOPHAGEAL: ICD-10-PCS | Performed by: INTERNAL MEDICINE

## 2022-05-31 PROCEDURE — 6360000002 HC RX W HCPCS: Performed by: INTERNAL MEDICINE

## 2022-05-31 PROCEDURE — 6370000000 HC RX 637 (ALT 250 FOR IP): Performed by: INTERNAL MEDICINE

## 2022-05-31 PROCEDURE — 94761 N-INVAS EAR/PLS OXIMETRY MLT: CPT

## 2022-05-31 PROCEDURE — 7100000001 HC PACU RECOVERY - ADDTL 15 MIN

## 2022-05-31 PROCEDURE — 82803 BLOOD GASES ANY COMBINATION: CPT

## 2022-05-31 PROCEDURE — 93017 CV STRESS TEST TRACING ONLY: CPT

## 2022-05-31 PROCEDURE — C1769 GUIDE WIRE: HCPCS

## 2022-05-31 PROCEDURE — 80048 BASIC METABOLIC PNL TOTAL CA: CPT

## 2022-05-31 PROCEDURE — 6360000004 HC RX CONTRAST MEDICATION

## 2022-05-31 PROCEDURE — 85025 COMPLETE CBC W/AUTO DIFF WBC: CPT

## 2022-05-31 PROCEDURE — B2111ZZ FLUOROSCOPY OF MULTIPLE CORONARY ARTERIES USING LOW OSMOLAR CONTRAST: ICD-10-PCS | Performed by: INTERNAL MEDICINE

## 2022-05-31 PROCEDURE — 93312 ECHO TRANSESOPHAGEAL: CPT

## 2022-05-31 PROCEDURE — 2700000000 HC OXYGEN THERAPY PER DAY

## 2022-05-31 PROCEDURE — 2500000003 HC RX 250 WO HCPCS

## 2022-05-31 PROCEDURE — 2709999900 HC NON-CHARGEABLE SUPPLY

## 2022-05-31 PROCEDURE — 7100000000 HC PACU RECOVERY - FIRST 15 MIN

## 2022-05-31 PROCEDURE — C1751 CATH, INF, PER/CENT/MIDLINE: HCPCS

## 2022-05-31 PROCEDURE — 2580000003 HC RX 258: Performed by: INTERNAL MEDICINE

## 2022-05-31 PROCEDURE — 36415 COLL VENOUS BLD VENIPUNCTURE: CPT

## 2022-05-31 PROCEDURE — C1894 INTRO/SHEATH, NON-LASER: HCPCS

## 2022-05-31 PROCEDURE — 4A023N6 MEASUREMENT OF CARDIAC SAMPLING AND PRESSURE, RIGHT HEART, PERCUTANEOUS APPROACH: ICD-10-PCS | Performed by: INTERNAL MEDICINE

## 2022-05-31 RX ORDER — ACETAMINOPHEN 325 MG/1
650 TABLET ORAL EVERY 4 HOURS PRN
Status: DISCONTINUED | OUTPATIENT
Start: 2022-05-31 | End: 2022-05-31 | Stop reason: HOSPADM

## 2022-05-31 RX ORDER — SODIUM CHLORIDE 9 MG/ML
INJECTION, SOLUTION INTRAVENOUS CONTINUOUS
Status: DISCONTINUED | OUTPATIENT
Start: 2022-05-31 | End: 2022-05-31 | Stop reason: HOSPADM

## 2022-05-31 RX ORDER — METOPROLOL TARTRATE 50 MG/1
50 TABLET, FILM COATED ORAL 2 TIMES DAILY
Qty: 60 TABLET | Refills: 3 | Status: SHIPPED | OUTPATIENT
Start: 2022-05-31

## 2022-05-31 RX ORDER — METOPROLOL TARTRATE 50 MG/1
50 TABLET, FILM COATED ORAL 2 TIMES DAILY
Qty: 60 TABLET | Refills: 3 | Status: CANCELLED | OUTPATIENT
Start: 2022-05-31

## 2022-05-31 RX ORDER — ATROPINE SULFATE 0.4 MG/ML
0.5 AMPUL (ML) INJECTION
Status: DISCONTINUED | OUTPATIENT
Start: 2022-05-31 | End: 2022-05-31 | Stop reason: HOSPADM

## 2022-05-31 RX ORDER — SODIUM CHLORIDE 9 MG/ML
INJECTION, SOLUTION INTRAVENOUS PRN
Status: DISCONTINUED | OUTPATIENT
Start: 2022-05-31 | End: 2022-05-31 | Stop reason: HOSPADM

## 2022-05-31 RX ORDER — SODIUM CHLORIDE 0.9 % (FLUSH) 0.9 %
5-40 SYRINGE (ML) INJECTION EVERY 12 HOURS SCHEDULED
Status: DISCONTINUED | OUTPATIENT
Start: 2022-05-31 | End: 2022-05-31 | Stop reason: HOSPADM

## 2022-05-31 RX ORDER — SODIUM CHLORIDE 0.9 % (FLUSH) 0.9 %
5-40 SYRINGE (ML) INJECTION PRN
Status: DISCONTINUED | OUTPATIENT
Start: 2022-05-31 | End: 2022-05-31 | Stop reason: HOSPADM

## 2022-05-31 RX ORDER — MORPHINE SULFATE 2 MG/ML
1 INJECTION, SOLUTION INTRAMUSCULAR; INTRAVENOUS
Status: DISCONTINUED | OUTPATIENT
Start: 2022-05-31 | End: 2022-05-31 | Stop reason: HOSPADM

## 2022-05-31 RX ADMIN — FUROSEMIDE 20 MG: 20 TABLET ORAL at 13:45

## 2022-05-31 RX ADMIN — METOPROLOL TARTRATE 50 MG: 25 TABLET, FILM COATED ORAL at 13:45

## 2022-05-31 RX ADMIN — ASPIRIN 81 MG 81 MG: 81 TABLET ORAL at 13:45

## 2022-05-31 RX ADMIN — ONDANSETRON 4 MG: 2 INJECTION INTRAMUSCULAR; INTRAVENOUS at 08:47

## 2022-05-31 RX ADMIN — ENOXAPARIN SODIUM 30 MG: 100 INJECTION SUBCUTANEOUS at 13:45

## 2022-05-31 RX ADMIN — SODIUM CHLORIDE: 9 INJECTION, SOLUTION INTRAVENOUS at 13:45

## 2022-05-31 RX ADMIN — ONDANSETRON 4 MG: 2 INJECTION INTRAMUSCULAR; INTRAVENOUS at 16:02

## 2022-05-31 NOTE — PROGRESS NOTES
Patient is ok to be discharged  Discussed with /Loy at [facility] for patient to follow up there and decide on bi valve surgery vs-AS=-TAVR and watch mitral valve  Keep on current meds on d/c

## 2022-05-31 NOTE — PROGRESS NOTES
Daily Progress Note     patient is awake alert feeling ok  Cath mild CAD  Severe AS noted-check STANTON for MS and MR-has MAC noted  Keep on lopressor for now and lasix asa and statins   Possible home later today after  STANTON     Hyperthyroid    Elevated Free T4    Treatment per primary      Echo-- Summary   Left ventricular systolic function is normal.   Moderately dilated left atrium.   Heavily calcified aortic valve with severe aortic stenosis; mean P   mmHG. SHAUNA: 0.5 cm2. DVI: 0.2 . SHAUNA indexed for BSA: 0.39 cm2/m2.   MIld aortic regurgitation.   Large mitral annular calcification; moderate to severe mitral stenosis.  Mean   PmmHg.   Severe mitral regurgitation.   Moderate tricuspid regurgitation; RVSP: 46 mmHg.   No evidence of any pericardial effusion.   Atherosclerotic plaque noted in the abdominal aorta     PAST MEDICAL HISTORY:  History of having hypertension present, arthritis  and hypothyroidism present.     PAST SURGICAL HISTORY:  Carpal tunnel surgery.     SOCIAL HISTORY:  Does not smoke.  Does not drink.       Objective:   BP (!) 143/85   Pulse 59   Temp 97.7 °F (36.5 °C) (Oral)   Resp 16   Ht 5' 5\" (1.651 m)   Wt 92 lb (41.7 kg)   SpO2 97%   BMI 15.31 kg/m²   No intake or output data in the 24 hours ending 22 1034    Medications:   Scheduled Meds:   metoprolol tartrate  50 mg Oral BID    aspirin  81 mg Oral Daily    furosemide  20 mg Oral Daily    sodium chloride flush  5-40 mL IntraVENous 2 times per day    atorvastatin  20 mg Oral Daily    enoxaparin  30 mg SubCUTAneous Daily      Infusions:   sodium chloride        PRN Meds:  albuterol sulfate HFA, ipratropium, sodium chloride flush, sodium chloride, ondansetron **OR** ondansetron, polyethylene glycol, acetaminophen **OR** acetaminophen       Physical Exam:  Vitals:    22 1000   BP: (!) 143/85   Pulse: 59   Resp: 16   Temp:    SpO2: 97%        General: awake alert   Chest: Nontender  Cardiac: sinus   Lungs:Clear to auscultation and percussion. Abdomen: Soft, NT, ND, +BS  Extremities: no edema   Vascular:  Equal 2+ peripheral pulses. Lab Data:  CBC:   Recent Labs     05/28/22  1439 05/29/22  0234 05/31/22  0506   WBC 7.8 6.5 7.2   HGB 9.5* 10.3* 10.3*   HCT 31.7* 33.1* 33.8*   MCV 88.1 86.6 86.7    347 344     BMP:   Recent Labs     05/28/22  1112 05/29/22  0234 05/31/22  0506    140 137   K 4.1 4.1 4.2    100 100   CO2 24 24 26   BUN 18 23 24*   CREATININE 0.6 0.6 0.5*     LIVER PROFILE:   Recent Labs     05/28/22  1112   AST 29   ALT 21   BILITOT 0.4   ALKPHOS 70     PT/INR:   Recent Labs     05/28/22  1112   PROTIME 11.5*   INR 0.89     APTT:   Recent Labs     05/28/22  1112   APTT 27.2     BNP:  No results for input(s): BNP in the last 72 hours.       Assessment:  Patient Active Problem List    Diagnosis Date Noted    Severe aortic valve stenosis 05/31/2022    Nonrheumatic aortic valve stenosis 05/30/2022    Nonrheumatic mitral valve stenosis 05/30/2022    Dyspnea 05/28/2022       Alicia Shirley MD, MD 5/31/2022 10:34 AM

## 2022-05-31 NOTE — PLAN OF CARE
Problem: Discharge Planning  Goal: Discharge to home or other facility with appropriate resources  5/31/2022 1545 by Yelena Quick RN  Outcome: Completed  5/31/2022 1411 by Ghulam Rosenberg LPN  Outcome: Progressing  5/31/2022 0151 by Harry Rasmussen RN  Outcome: Progressing     Problem: Safety - Adult  Goal: Free from fall injury  5/31/2022 1545 by Yelena Quick RN  Outcome: Completed  5/31/2022 1411 by Ghulam Rosenberg LPN  Outcome: Progressing  5/31/2022 0151 by Harry Rasmussen RN  Outcome: Progressing

## 2022-05-31 NOTE — PLAN OF CARE
Problem: Discharge Planning  Goal: Discharge to home or other facility with appropriate resources  5/31/2022 1411 by [place] Reed LPN  Outcome: Progressing  5/31/2022 0151 by Cuca Ames RN  Outcome: Progressing     Problem: Safety - Adult  Goal: Free from fall injury  5/31/2022 1411 by [place] Reed LPN  Outcome: Progressing  5/31/2022 0151 by Cuca Ames RN  Outcome: Progressing

## 2022-05-31 NOTE — PROCEDURES
[address] [place] [address], [address] [place] [address]                            CARDIAC CATHETERIZATION    PATIENT NAME: Megan Cavazos                      :        1940  MED REC NO:   [id number]                          ROOM:  ACCOUNT NO:   [de-identified]                           ADMIT DATE: 2022  PROVIDER:     Courtney Miller MD    DATE OF PROCEDURE:  2022    INDICATION:  Aortic stenosis, mitral stenosis. PROCEDURE:  This is a 72-year-old female patient brought to the cath lab  today. Informed consent was obtained from the patient. The patient was  prepped and draped in the usual sterile fashion. The patient was  injected with 20 mL of 2% lidocaine in the right femoral artery region. Using a micropuncture needle, the right femoral artery was cannulized  and a 6-Azerbaijani sheath was placed in the right femoral artery. A  5-Azerbaijani venous sheath was placed in the right femoral vein. Right heart catheterization was performed. Right heart catheterization  shows RA is 2/1 with a mean of 0, RV is 24/1 with a mean of 2, PA is  _____ with a mean of 12. Pulmonary capillary wedge pressure is 7/6 with  a mean of 5 present. Aortic sat is 97% on room air. Then, using a JL-5 4-Azerbaijani catheter, left coronary angiography was  performed. The left coronary angiogram revealed the left main is  patent. It bifurcates into LAD and circumflex artery. Circ is a medium-sized vessel, it gives off the medium-sized OM branch,  mild disease noted. LAD is a medium-sized vessel and it reaches and  wraps the apex. LAD has mild disease noted. It gives off small ramus  branch and high diagonal branch present. Mild disease present. Using an AR-MOD catheter, right coronary angiography was performed. The  right coronary angiogram revealed the right coronary artery has calcium  noted in the ostium, but TRACY-3 flow is noted.   It is a dominant vessel. Right coronary artery is patent. IMPRESSION:  1. Left main is patent. 2.  LAD has mild disease noted. 3.  Circ has mild disease noted. 4.  RCA ostium calcium noted. No dampening of pressure noted. Just  mild disease noted. 5.  Aortic valve is not crossed. 6.  There is a significant mitral annular calcification present. 7.  Right heart pressures are normal.  8.  The patient has nonobstructive coronary artery disease present. 9.  Significant mitral annular calcification noted. PLAN:  We will plan for STANTON to further evaluate mitral valve. Then, we  will make further recommendations on the patient's treatment. The  patient tolerated the procedure well. Sheath was removed in the cath  lab.       Blood los 20cc  \    Reyna Graham MD    D: 05/31/2022 8:31:27       T: 05/31/2022 12:14:08     [email or web address]  Job#: [id number]     Doc#: [id number]    CC:

## 2022-05-31 NOTE — PROCEDURES
[address] [place] [address], [address] [place] [address]                                 ECHOCARDIOGRAM    PATIENT NAME: Brad Ruiz                      :        1940  MED REC NO:   [id number]                          ROOM:  ACCOUNT NO:   [de-identified]                           ADMIT DATE: 2022  PROVIDER:     Ivette Holden MD    STANTON    INDICATION:  Mitral valve evaluation. This is a 82-year-old  female patient with severe aortic stenosis noted. Mitral stenosis noted. STANTON was performed in the cath lab holding. The  patient received 2 mg of Versed and 25 mcg of fentanyl. Posterior  oropharynx was anesthetized using lidocaine gel. Mouth guard was  placed. STANTON probe was advanced to posterior oropharynx and mid  esophagus. Images were obtained. The left atrium is moderately enlarged. No clot or thrombus noted in  the left atrium or left atrial appendage. Left atrial appendage is  moderately enlarged. There is moderate mitral regurgitation present and  significant mitral annular calcification present. Posterior mitral  valve leaflet is calcified and restricted. Anterior valve leaflet is  moving. Aortic valve is sclerotic and ascending aorta appears to be  dilated also. Tricuspid valve is normal.  Moderate tricuspid  regurgitation noted. Pulmonic valve is grossly normal.  LV function is  preserved, 50-55%. IMPRESSION:  1. No clot or thrombus noted in the left atrium or left atrial  appendage. 2.  Left atrium is moderately enlarged. 3.  Moderate mitral regurgitation noted. 4.  Mitral annular calcification noted. 5.  Posterior mitral leaflet is calcified and restricted. 6.  Anterior leaflet is moving. 7.  Severe aortic stenosis is noted. 8.  Ascending aorta is dilated also. We will discuss and make further recommendation. The patient tolerated  the procedure well. No complications noted.         Momo Escobar MD    D: 05/31/2022 12:00:59       T: 05/31/2022 12:30:25     [email or web address]  Job#: [id number]     Doc#: [id number]    CC:

## 2022-05-31 NOTE — PROGRESS NOTES
Received from cath lab. Monitor and alarms on. Call Light in reach. PT nauseated. Zofran given IVP. Dr Jennifer Tristan at bedside.

## 2022-05-31 NOTE — OP NOTE
Operative Note      Patient: Carmen Granado  YOB: 1940  MRN: [id number]    Date of Procedure: 5/31/22    Pre-Op Diagnosis: Aortic stenosis and mitral valve stenosis   Post-Op Diagnosis: Same         Estimated Blood Loss (mL): Minimal    Complications: None      Electronically signed by Evert Howe MD on 5/31/2022 at 8:27 AM    [id number]  LEFT MAIN PATENT  LAD MILD DX  LCX MILD DX  RCA MILD DX  CALCIUM NOTED ON RCA OSTIUM NO DAMPENING OF PRESSURE  AV NOT CROSSED  RA-2/1/0  RV-24/1/2  PA-22/6/12  PCWP-7/6/5  MILD CAD  WILL CHECK STANTON FOR MV  MAC NOTED     RIGHT GROIN APPROACH     Electronically signed by Evert Howe MD on 5/31/22 at 8:33 AM EDT

## 2022-05-31 NOTE — PROGRESS NOTES
[id number]   STANTON EF 50%  Moderate MR-=MAC noted and posterior leaflet restricted   Severe AS     Will discuss with family and make a decision\

## 2022-05-31 NOTE — DISCHARGE SUMMARY
Baldomero Chau MD, Internal Medicine    [address] [facility]   [phone]   Patient ID  Bernie Anna   1940  [id number]          Admit date: 5/28/2022   Discharge date: 5/31/2022      Admitting Physician: Shell Owens MD   Discharge Physician: Noemi Allen PA-C    Discharge Diagnoses:   1.  Dyspnea/exertional chest pain/significant valvular heart disease including severe aortic stenosis/ACS rule out. Heart cath shows mild coronary artery disease. Echo with severe aortic stenosis and moderate mitral regurgitation, follow-up at [facility] with Dr. Kimberly Phillips for further intervention for possible bivalve surgery vs TAVR and watch mitral valve. 2.  COPD/small right pleural effusion. On chest x-ray performed on 05/28/2022, the patient was noted to have COPD and a small right pleural effusion. Emphysematous changes present throughout the lungs on 05/28/2022 chest CT. Patient continues to deny any personal history of smoking and denies any history of secondhand smoke as well. Continuing to monitor and continuing DuoNeb. 3.  Acute minimally displaced fracture of the posterior medial left fifth rib without pneumothorax and subacute healing fractures of the posterior medial left 6 through 11 ribs/fall last week per patient. On 05/28/2022 CT chest, patient was noted to have these rib fractures. Patient denies any pain at this time. She does report she did trip inside her house (unsure what she tripped over) sometime last week where she fell into the wall impacting her anterior thorax on the left side and noted sensitive tenderness which has since resolved. 4.  Hypertension. Medications adjusted per cardiology. 5.  Hyperlipidemia.  Continue atorvastatin. 6.  Hypothyroidism.  Adjust synthroid dose.       Patient Active Problem List   Diagnosis    Dyspnea    Nonrheumatic aortic valve stenosis    Nonrheumatic mitral valve stenosis    Severe aortic valve stenosis Discharged Condition: fair    Hospital Course: The patient is a 26-year-old female who presents to the ER with dyspnea and chest tenderness. She has history of HTN, HLD, hypothyroidism. Patient also complained of some dizziness, she reportedly almost passed out at home. CT chest did show several healing left-sided rib fractures. She reports she had a fall at home over a week ago and has no pain right now. Initial troponin elevated at 0.025. Patient was admitted and cardiology was consulted. Patient had an echocardiogram which showed severe aortic stenosis as well as mitral valve regurgitation. Patient had recurrent dizziness in the hospital so blood pressure medications were adjusted. Patient had cardiac catheterization which showed mild coronary artery disease. She then had STANTON which showed moderate mitral regurgitation, posterior mitral leaflet is calcified and restricted, anterior leaflet is moving. Also severe aortic stenosis is noted on echo. Patient will be discharged and follow-up with Dr. Roderick Tolbert at [facility] for further intervention-possible bivalve surgery versus TAVR and watch mitral valve. Patient is to follow-up with PCP, cardiologist as outpatient. Relevant Investigations  STANTON, 5/31/2022  1.  No clot or thrombus noted in the left atrium or left atrial   appendage. 2.  Left atrium is moderately enlarged. 3.  Moderate mitral regurgitation noted. 4.  Mitral annular calcification noted. 5.  Posterior mitral leaflet is calcified and restricted. 6.  Anterior leaflet is moving. 7.  Severe aortic stenosis is noted. 8.  Ascending aorta is dilated also. We will discuss and make further recommendation.  The patient tolerated the procedure well.  No complications noted. Cardiac catheterization, 5/31/2022  1.  Left main is patent. 2.  LAD has mild disease noted. 3.  Circ has mild disease noted.    4.  RCA ostium calcium noted.  No dampening of pressure noted.  Just mild disease noted. 5.  Aortic valve is not crossed. 6. Korina Seller is a significant mitral annular calcification present. 7.  Right heart pressures are normal.   8.  The patient has nonobstructive coronary artery disease present. 9.  Significant mitral annular calcification noted. CT chest, 5/20/2022  1. Acute, minimally displaced fracture involving the posteromedial left 5th rib, without evidence of a pneumothorax   2. Subacute, healing fractures involving the posteromedial left 6 through 11th ribs   3. Small bilateral pleural effusions   4. Scar versus atelectasis present within the lung bases bilaterally   5. Cardiomegaly, with coronary arterial calcifications and dense calcification of the mitral valve annulus   6.  Emphysematous changes present throughout the lungs     Disposition: home    Patient Instructions:      Medication List      START taking these medications    aspirin 81 MG chewable tablet  Take 1 tablet by mouth daily     furosemide 20 MG tablet  Commonly known as: LASIX  Take 1 tablet by mouth daily     metoprolol tartrate 50 MG tablet  Commonly known as: LOPRESSOR  Take 1 tablet by mouth 2 times daily        CHANGE how you take these medications    levothyroxine 100 MCG tablet  Commonly known as: SYNTHROID  Take 1 tablet by mouth daily  What changed:   · medication strength  · how much to take  · when to take this        CONTINUE taking these medications    atorvastatin 20 MG tablet  Commonly known as: LIPITOR        STOP taking these medications    Lotrel 5-20 MG per capsule  Generic drug: amLODIPine-benazepril     meloxicam 15 MG tablet  Commonly known as: MOBIC           Where to Get Your Medications      These medications were sent to [address] [place] [address] (), [place] - [facility] [phone] - F [phone]  [address] [address]    Phone: [phone]   · aspirin 81 MG chewable tablet  · furosemide 20 MG tablet  · levothyroxine 100 MCG tablet     You can get these medications from any pharmacy    Bring a paper prescription for each of these medications  · metoprolol tartrate 50 MG tablet          Activity: activity as tolerated  Diet: cardiac diet    Follow-up with Dr. Betty Tse in 5 days    Signed: Electronically signed by Joie Franco PA-C on 5/31/2022 at 2:10 PM    Time spent on discharge 35 minutes  I have independently evaluated and examined this patient today. I have reviewed radiologic and biochemical tests on this patient. Management Plan is developed mutually with NEHEMIAH Ott. I have reviewed above note and agree with assessment and plan. I have independently evaluated and examined this patient today. I have reviewed radiologic and biochemical tests on this patient. Management Plan is developed mutually with NEHEMIAH Ott. I have reviewed above note and agree with assessment and plan.

## 2022-06-02 NOTE — PROGRESS NOTES
Physician Progress Note      PATIENT:               Jayy Green  CSN #:                  [id number]  :                       1940  ADMIT DATE:       2022 11:46 AM  DISCH DATE:        2022 5:41 PM  RESPONDING  PROVIDER #:        Timur Mcnamara MD          QUERY TEXT:    Patient admitted with chest pain and dyspnea, noted to have . If possible,   please document in progress notes and discharge summary if you are evaluating   and /or treating any of the following: The medical record reflects the following:  Risk Factors: hx of COPD  Clinical Indicators: Pt had sudden onset of SOB and chest pain. CXR showed   COPD. Small right pleural effusion. VBG  pH, Kike: 7.34, pCO2, Kike: 51, pO2,   Kike: 49 (H), HCO3, Venous: 27.5 (H), O2 Sat, Kike: 76.8 (H), Base Exc, Mixed:   .9. ABG  pH, Bld: 7.40, O2 Sat: 94.3 (L), Carbon Monoxide, Blood: 1.9, CO2   CONTENT,ATCO2: 30.5 (H), pCO2, Arterial: 47.0 (H), pO2, Arterial: 82, HCO3,   Arterial: 29.1 (H), Methemoglobin, Arterial: 1.5 (H). No adventitious lung   sounds documented. Treatment: Albuterol, Atrovent, Duo-Nebs, Solumedrol IV, labs, imaging    Thank you,  Jose Sahni, RN  [phone]  Options provided:  -- COPD exacerbation  -- Emphysema  -- Other - I will add my own diagnosis  -- Disagree - Not applicable / Not valid  -- Disagree - Clinically unable to determine / Unknown  -- Refer to Clinical Documentation Reviewer    PROVIDER RESPONSE TEXT:    This patient has emphysema.     Query created by: Alver Scheuermann on 2022 9:30 AM      Electronically signed by:  Timur Mcnamara MD 2022 12:36 PM

## 2022-06-27 ENCOUNTER — HOSPITAL ENCOUNTER (INPATIENT)
Age: 82
LOS: 1 days | Discharge: ANOTHER HOSPITAL | DRG: 291 | End: 2022-06-28
Attending: EMERGENCY MEDICINE | Admitting: GENERAL PRACTICE
Payer: MEDICARE

## 2022-06-27 ENCOUNTER — APPOINTMENT (OUTPATIENT)
Dept: GENERAL RADIOLOGY | Age: 82
DRG: 291 | End: 2022-06-27
Payer: MEDICARE

## 2022-06-27 DIAGNOSIS — I50.9 CONGESTIVE HEART FAILURE, UNSPECIFIED HF CHRONICITY, UNSPECIFIED HEART FAILURE TYPE (HCC): ICD-10-CM

## 2022-06-27 DIAGNOSIS — I35.0 AORTIC STENOSIS, SEVERE: Primary | ICD-10-CM

## 2022-06-27 PROBLEM — I50.31 ACUTE DIASTOLIC CHF (CONGESTIVE HEART FAILURE), NYHA CLASS 4 (HCC): Status: ACTIVE | Noted: 2022-06-27

## 2022-06-27 PROBLEM — I50.33 ACUTE ON CHRONIC DIASTOLIC HEART FAILURE (HCC): Status: ACTIVE | Noted: 2022-06-27

## 2022-06-27 LAB
ALBUMIN SERPL-MCNC: 4.2 GM/DL (ref 3.4–5)
ALP BLD-CCNC: 92 IU/L (ref 40–128)
ALT SERPL-CCNC: 21 U/L (ref 10–40)
ANION GAP SERPL CALCULATED.3IONS-SCNC: 11 MMOL/L (ref 4–16)
AST SERPL-CCNC: 24 IU/L (ref 15–37)
BACTERIA: NEGATIVE /HPF
BASOPHILS ABSOLUTE: 0.1 K/CU MM
BASOPHILS RELATIVE PERCENT: 0.9 % (ref 0–1)
BILIRUB SERPL-MCNC: 0.3 MG/DL (ref 0–1)
BILIRUBIN URINE: NEGATIVE MG/DL
BLOOD, URINE: NEGATIVE
BUN BLDV-MCNC: 42 MG/DL (ref 6–23)
CALCIUM SERPL-MCNC: 9.3 MG/DL (ref 8.3–10.6)
CHLORIDE BLD-SCNC: 105 MMOL/L (ref 99–110)
CLARITY: CLEAR
CO2: 25 MMOL/L (ref 21–32)
COLOR: YELLOW
CREAT SERPL-MCNC: 0.9 MG/DL (ref 0.6–1.1)
DIFFERENTIAL TYPE: ABNORMAL
EOSINOPHILS ABSOLUTE: 0.4 K/CU MM
EOSINOPHILS RELATIVE PERCENT: 4.9 % (ref 0–3)
GFR AFRICAN AMERICAN: >60 ML/MIN/1.73M2
GFR NON-AFRICAN AMERICAN: >60 ML/MIN/1.73M2
GLUCOSE BLD-MCNC: 127 MG/DL (ref 70–99)
GLUCOSE, URINE: NEGATIVE MG/DL
HCT VFR BLD CALC: 31.9 % (ref 37–47)
HEMOGLOBIN: 9.6 GM/DL (ref 12.5–16)
HYALINE CASTS: 0 /LPF
IMMATURE NEUTROPHIL %: 0.4 % (ref 0–0.43)
KETONES, URINE: NEGATIVE MG/DL
LACTIC ACID, SEPSIS: 0.9 MMOL/L (ref 0.5–1.9)
LACTIC ACID, SEPSIS: 1.1 MMOL/L (ref 0.5–1.9)
LEUKOCYTE ESTERASE, URINE: NEGATIVE
LYMPHOCYTES ABSOLUTE: 0.9 K/CU MM
LYMPHOCYTES RELATIVE PERCENT: 12.2 % (ref 24–44)
MCH RBC QN AUTO: 27.4 PG (ref 27–31)
MCHC RBC AUTO-ENTMCNC: 30.1 % (ref 32–36)
MCV RBC AUTO: 90.9 FL (ref 78–100)
MONOCYTES ABSOLUTE: 0.7 K/CU MM
MONOCYTES RELATIVE PERCENT: 9.1 % (ref 0–4)
NITRITE URINE, QUANTITATIVE: NEGATIVE
NUCLEATED RBC %: 0 %
PDW BLD-RTO: 16.7 % (ref 11.7–14.9)
PH, URINE: 6.5 (ref 5–8)
PLATELET # BLD: 332 K/CU MM (ref 140–440)
PMV BLD AUTO: 10.8 FL (ref 7.5–11.1)
POTASSIUM SERPL-SCNC: 5 MMOL/L (ref 3.5–5.1)
PRO-BNP: ABNORMAL PG/ML
PROTEIN UA: NEGATIVE MG/DL
RBC # BLD: 3.51 M/CU MM (ref 4.2–5.4)
RBC URINE: NORMAL /HPF (ref 0–6)
SARS-COV-2, NAAT: NOT DETECTED
SEGMENTED NEUTROPHILS ABSOLUTE COUNT: 5.6 K/CU MM
SEGMENTED NEUTROPHILS RELATIVE PERCENT: 72.5 % (ref 36–66)
SODIUM BLD-SCNC: 141 MMOL/L (ref 135–145)
SOURCE: NORMAL
SPECIFIC GRAVITY UA: 1.01 (ref 1–1.03)
TOTAL IMMATURE NEUTOROPHIL: 0.03 K/CU MM
TOTAL NUCLEATED RBC: 0 K/CU MM
TOTAL PROTEIN: 7.3 GM/DL (ref 6.4–8.2)
TRICHOMONAS: NORMAL /HPF
TROPONIN T: 0.01 NG/ML
UROBILINOGEN, URINE: 0.2 MG/DL (ref 0.2–1)
WBC # BLD: 7.7 K/CU MM (ref 4–10.5)
WBC UA: NORMAL /HPF (ref 0–5)

## 2022-06-27 PROCEDURE — 6370000000 HC RX 637 (ALT 250 FOR IP): Performed by: GENERAL PRACTICE

## 2022-06-27 PROCEDURE — 71046 X-RAY EXAM CHEST 2 VIEWS: CPT

## 2022-06-27 PROCEDURE — 87086 URINE CULTURE/COLONY COUNT: CPT

## 2022-06-27 PROCEDURE — 85025 COMPLETE CBC W/AUTO DIFF WBC: CPT

## 2022-06-27 PROCEDURE — 6360000002 HC RX W HCPCS: Performed by: INTERNAL MEDICINE

## 2022-06-27 PROCEDURE — 84484 ASSAY OF TROPONIN QUANT: CPT

## 2022-06-27 PROCEDURE — 87088 URINE BACTERIA CULTURE: CPT

## 2022-06-27 PROCEDURE — 93005 ELECTROCARDIOGRAM TRACING: CPT | Performed by: INTERNAL MEDICINE

## 2022-06-27 PROCEDURE — 83605 ASSAY OF LACTIC ACID: CPT

## 2022-06-27 PROCEDURE — 87040 BLOOD CULTURE FOR BACTERIA: CPT

## 2022-06-27 PROCEDURE — 81001 URINALYSIS AUTO W/SCOPE: CPT

## 2022-06-27 PROCEDURE — 99285 EMERGENCY DEPT VISIT HI MDM: CPT

## 2022-06-27 PROCEDURE — 2580000003 HC RX 258: Performed by: GENERAL PRACTICE

## 2022-06-27 PROCEDURE — 87186 SC STD MICRODIL/AGAR DIL: CPT

## 2022-06-27 PROCEDURE — 80053 COMPREHEN METABOLIC PANEL: CPT

## 2022-06-27 PROCEDURE — 83880 ASSAY OF NATRIURETIC PEPTIDE: CPT

## 2022-06-27 PROCEDURE — 1200000000 HC SEMI PRIVATE

## 2022-06-27 PROCEDURE — 87635 SARS-COV-2 COVID-19 AMP PRB: CPT

## 2022-06-27 RX ORDER — ASPIRIN 81 MG/1
81 TABLET, CHEWABLE ORAL DAILY
Status: DISCONTINUED | OUTPATIENT
Start: 2022-06-27 | End: 2022-06-28 | Stop reason: HOSPADM

## 2022-06-27 RX ORDER — SODIUM CHLORIDE 0.9 % (FLUSH) 0.9 %
5-40 SYRINGE (ML) INJECTION PRN
Status: DISCONTINUED | OUTPATIENT
Start: 2022-06-27 | End: 2022-06-28 | Stop reason: HOSPADM

## 2022-06-27 RX ORDER — FUROSEMIDE 10 MG/ML
20 INJECTION INTRAMUSCULAR; INTRAVENOUS ONCE
Status: DISCONTINUED | OUTPATIENT
Start: 2022-06-27 | End: 2022-06-27

## 2022-06-27 RX ORDER — SODIUM CHLORIDE 0.9 % (FLUSH) 0.9 %
5-40 SYRINGE (ML) INJECTION EVERY 12 HOURS SCHEDULED
Status: DISCONTINUED | OUTPATIENT
Start: 2022-06-27 | End: 2022-06-28 | Stop reason: HOSPADM

## 2022-06-27 RX ORDER — FUROSEMIDE 10 MG/ML
20 INJECTION INTRAMUSCULAR; INTRAVENOUS 2 TIMES DAILY
Status: DISCONTINUED | OUTPATIENT
Start: 2022-06-28 | End: 2022-06-28 | Stop reason: HOSPADM

## 2022-06-27 RX ORDER — FUROSEMIDE 10 MG/ML
40 INJECTION INTRAMUSCULAR; INTRAVENOUS ONCE
Status: COMPLETED | OUTPATIENT
Start: 2022-06-27 | End: 2022-06-27

## 2022-06-27 RX ORDER — ATORVASTATIN CALCIUM 10 MG/1
20 TABLET, FILM COATED ORAL DAILY
Status: DISCONTINUED | OUTPATIENT
Start: 2022-06-27 | End: 2022-06-28 | Stop reason: HOSPADM

## 2022-06-27 RX ORDER — LEVOTHYROXINE SODIUM 0.1 MG/1
100 TABLET ORAL DAILY
Status: DISCONTINUED | OUTPATIENT
Start: 2022-06-28 | End: 2022-06-28 | Stop reason: HOSPADM

## 2022-06-27 RX ORDER — MELOXICAM 15 MG/1
15 TABLET ORAL DAILY
COMMUNITY

## 2022-06-27 RX ORDER — FUROSEMIDE 10 MG/ML
20 INJECTION INTRAMUSCULAR; INTRAVENOUS DAILY
Status: DISCONTINUED | OUTPATIENT
Start: 2022-06-27 | End: 2022-06-27

## 2022-06-27 RX ORDER — SODIUM CHLORIDE 9 MG/ML
INJECTION, SOLUTION INTRAVENOUS PRN
Status: DISCONTINUED | OUTPATIENT
Start: 2022-06-27 | End: 2022-06-28 | Stop reason: HOSPADM

## 2022-06-27 RX ORDER — LISINOPRIL 5 MG/1
5 TABLET ORAL DAILY
Status: DISCONTINUED | OUTPATIENT
Start: 2022-06-28 | End: 2022-06-28 | Stop reason: HOSPADM

## 2022-06-27 RX ORDER — POLYETHYLENE GLYCOL 3350 17 G/17G
17 POWDER, FOR SOLUTION ORAL DAILY PRN
Status: DISCONTINUED | OUTPATIENT
Start: 2022-06-27 | End: 2022-06-28 | Stop reason: HOSPADM

## 2022-06-27 RX ORDER — ONDANSETRON 4 MG/1
4 TABLET, ORALLY DISINTEGRATING ORAL EVERY 8 HOURS PRN
Status: DISCONTINUED | OUTPATIENT
Start: 2022-06-27 | End: 2022-06-28 | Stop reason: HOSPADM

## 2022-06-27 RX ORDER — METOPROLOL TARTRATE 50 MG/1
50 TABLET, FILM COATED ORAL 2 TIMES DAILY
Status: DISCONTINUED | OUTPATIENT
Start: 2022-06-27 | End: 2022-06-28 | Stop reason: HOSPADM

## 2022-06-27 RX ORDER — ONDANSETRON 2 MG/ML
4 INJECTION INTRAMUSCULAR; INTRAVENOUS EVERY 6 HOURS PRN
Status: DISCONTINUED | OUTPATIENT
Start: 2022-06-27 | End: 2022-06-28 | Stop reason: HOSPADM

## 2022-06-27 RX ORDER — ACETAMINOPHEN 325 MG/1
650 TABLET ORAL EVERY 6 HOURS PRN
Status: DISCONTINUED | OUTPATIENT
Start: 2022-06-27 | End: 2022-06-28 | Stop reason: HOSPADM

## 2022-06-27 RX ORDER — ENOXAPARIN SODIUM 100 MG/ML
30 INJECTION SUBCUTANEOUS DAILY
Status: DISCONTINUED | OUTPATIENT
Start: 2022-06-28 | End: 2022-06-28 | Stop reason: HOSPADM

## 2022-06-27 RX ORDER — ACETAMINOPHEN 650 MG/1
650 SUPPOSITORY RECTAL EVERY 6 HOURS PRN
Status: DISCONTINUED | OUTPATIENT
Start: 2022-06-27 | End: 2022-06-28 | Stop reason: HOSPADM

## 2022-06-27 RX ADMIN — METOPROLOL TARTRATE 50 MG: 50 TABLET, FILM COATED ORAL at 20:14

## 2022-06-27 RX ADMIN — SODIUM CHLORIDE, PRESERVATIVE FREE 10 ML: 5 INJECTION INTRAVENOUS at 21:00

## 2022-06-27 RX ADMIN — FUROSEMIDE 40 MG: 10 INJECTION, SOLUTION INTRAMUSCULAR; INTRAVENOUS at 14:15

## 2022-06-27 NOTE — ED NOTES
Medication History  [facility]    Patient Name: Sridevi Montague 1940     Medication history has been completed by: Anika Bennett CPhT    Source(s) of information: patient's   and insurance claims     [place] Care Phys[place] [facility] [facility] [place]    Allergies as of 06/27/2022 - Fully Reviewed 06/27/2022   Allergen Reaction Noted    Ceclor [cefaclor] Hives 11/25/2018        Prior to Admission medications    Medication Sig Start Date End Date Taking? Authorizing Provider   Ascorbic Acid (VITAMIN C PO) Take 1 tablet by mouth daily   Yes Historical Provider, MD   Multiple Vitamin (MULTIVITAMIN ADULT PO) Take 1 tablet by mouth daily   Yes Historical Provider, MD   diphenhydrAMINE-APAP, sleep, (TYLENOL PM EXTRA STRENGTH PO) Take 2 tablets by mouth nightly   Yes Historical Provider, MD   metoprolol tartrate (LOPRESSOR) 50 MG tablet Take 1 tablet by mouth 2 times daily 5/31/22   Jocelin Link PA-C   aspirin 81 MG chewable tablet Take 1 tablet by mouth daily 5/30/22   Rachel Rice MD   furosemide (LASIX) 20 MG tablet Take 1 tablet by mouth daily 5/30/22   Rachel Rice MD   levothyroxine (SYNTHROID) 100 MCG tablet Take 1 tablet by mouth daily 5/30/22   Rachel Rice MD   atorvastatin (LIPITOR) 20 MG tablet Take 20 mg by mouth daily    Historical Provider, MD     Medications added or changed (ex. new medication, dosage change, interval change, formulation change): MVI daily (added)  Vitamin C daily (added)  Tylenol PM (added)    Comments:  Medication list reviewed with patient and insurance claims verified. Patient reports she has taken first dose of medications today. Recent claims for meloxicam patient states she is no longer taking this.     To my knowledge the above medication history is accurate as of 6/27/2022 10:51 AM.   Anika Bennett CPhT   6/27/2022 10:51 AM

## 2022-06-27 NOTE — ED NOTES
Nurse to Nurse : [facility] @ [facility] notified.       Ross Meléndez RN  06/27/22 3082

## 2022-06-27 NOTE — ED NOTES
[phone] called [facility] consult line to check on bed status. Spoke with Luz Noe at [place] [address] intake. She stated that patient is on list for bed placement. But, it could be one-two days before bed is available.      Lida [address] called Dr Beena Rossi  06/27/22 [phone] called Dr Dania Peralta to make aware that patient was being admitted here and waiting for bed placement at [facility].      Kristi Espinosa  06/27/22 9986

## 2022-06-27 NOTE — CONSULTS
Dictated --[id number]  Transfer to [facility]  She has severe AS  Discussed with Johnson Pinto and ER provider and      Give lasix 40mg iv    Electronically signed by Cem Stein MD on 6/27/22 at 1:07 PM EDT

## 2022-06-27 NOTE — PLAN OF CARE
Problem: Discharge Planning  Goal: Discharge to home or other facility with appropriate resources  Outcome: Progressing  Flowsheets (Taken 6/27/2022 1843)  Discharge to home or other facility with appropriate resources:   Identify barriers to discharge with patient and caregiver   Identify discharge learning needs (meds, wound care, etc)   Arrange for needed discharge resources and transportation as appropriate

## 2022-06-27 NOTE — ED PROVIDER NOTES
Emergency [address] EMERGENCY DEPARTMENT    Patient: Giuliaon Soliz  MRN: [id number]  : 1940  Date of Evaluation: 2022  ED Provider: Vicki Lezama MD    Chief Complaint       Chief Complaint   Patient presents with    Shortness of Breath     LENCHO Soliz is a 80 y.o. female who presents to the emergency department for evaluation of shortness of breath. Patient reports been usual state of health until these last several months when symptoms for started. No reported fevers or sick contacts no recent travel no changes of diet or medications. Patient is not a smoker. No history of DVTs or PEs she denies fevers chest pain or cough. Does have a chronic murmur. States that over the last 24 hours she has had worsening shortness of breath as well as bilateral leg swelling. No recent changes of diet or medications no fevers trauma or immobilization. ROS:     At least 10 systems reviewed and otherwise acutely negative except as in the [address]     Past History     Past Medical History:   Diagnosis Date    Arthritis     Hypertension      Past Surgical History:   Procedure Laterality Date    CARPAL TUNNEL RELEASE      COSMETIC SURGERY       Social History     Socioeconomic History    Marital status:      Spouse name: Not on file    Number of children: Not on file    Years of education: Not on file    Highest education level: Not on file   Occupational History    Not on file   Tobacco Use    Smoking status: Never Smoker    Smokeless tobacco: Current User   Substance and Sexual Activity    Alcohol use: No    Drug use: No    Sexual activity: Not on file   Other Topics Concern    Not on file   Social History Narrative    Not on file     Social Determinants of Health     Financial Resource Strain:     Difficulty of Paying Living Expenses: Not on file   Food Insecurity:     Worried About [address] in the Last Year: Not on file    Ran Out of Food in the Last Year: Not on file   Transportation Needs:     Lack of Transportation (Medical): Not on file    Lack of Transportation (Non-Medical):  Not on file   Physical Activity:     Days of Exercise per Week: Not on file    Minutes of Exercise per Session: Not on file   Stress:     Feeling of Stress : Not on file   Social Connections:     Frequency of Communication with Friends and Family: Not on file    Frequency of Social Gatherings with Friends and Family: Not on file    Attends Bahai Services: Not on file    Active Member of [address] [place] [address] [employer] or Organizations: Not on file    Attends Club or Organization Meetings: Not on file    Marital Status: Not on file   Intimate Partner Violence:     Fear of Current or Ex-Partner: Not on file    Emotionally Abused: Not on file    Physically Abused: Not on file    Sexually Abused: Not on file   Housing Stability:     Unable to Pay for Housing in the Last Year: Not on file    Number of [place] in the Last Year: Not on file    Unstable Housing in the Last Year: Not on file       Medications/Allergies     Previous Medications    ASCORBIC ACID (VITAMIN C PO)    Take 1 tablet by mouth daily    ASPIRIN 81 MG CHEWABLE TABLET    Take 1 tablet by mouth daily    ATORVASTATIN (LIPITOR) 20 MG TABLET    Take 20 mg by mouth daily    DIPHENHYDRAMINE-APAP, SLEEP, (TYLENOL PM EXTRA STRENGTH PO)    Take 2 tablets by mouth nightly    FUROSEMIDE (LASIX) 20 MG TABLET    Take 1 tablet by mouth daily    LEVOTHYROXINE (SYNTHROID) 100 MCG TABLET    Take 1 tablet by mouth daily    METOPROLOL TARTRATE (LOPRESSOR) 50 MG TABLET    Take 1 tablet by mouth 2 times daily    MULTIPLE VITAMIN (MULTIVITAMIN ADULT PO)    Take 1 tablet by mouth daily     Allergies   Allergen Reactions    Ceclor [Cefaclor] Hives        Physical Exam       ED Triage Vitals [06/27/22 1016]   BP Temp Temp src Heart Rate Resp SpO2 Height Weight   (!) 166/95 98 °F (36.7 °C) -- 72 23 94 % 5' 5\" (1.651 m) 92 lb (41.7 kg)     GENERAL APPEARANCE: Awake and alert. Cooperative. No acute distress. HEAD: Normocephalic. Atraumatic. EYES: Sclera anicteric. Pupils equal round reactive to light extraocular movements are intact  ENT: Tolerates saliva. No trismus. Moist mucous membranes  NECK: Supple. Trachea midline. No meningismus  CARDIO: RRR. Radial pulse 2+. No murmurs rubs or gallops appreciated  LUNGS: Respirations unlabored. CTAB. No accessory muscle usage noted. No wheezes rales rhonchi or stridor. ABDOMEN: Soft. Non-distended. Non-tender. No tenderness in right upper quadrant or right lower quadrant to deep palpation  EXTREMITIES: No acute deformities. No unilateral leg swelling or tenderness behind either one of calves  SKIN: Warm and dry. No erythema edema or rashes appreciated  NEUROLOGICAL:  Cranial nerves II through XII grossly intact. No gross facial drooping. Moves all 4 extremities spontaneously. PSYCHIATRIC: Normal mood. Alert and oriented x3. No reported active suicidality or homicidality.     Diagnostics   Labs:  Results for orders placed or performed during the hospital encounter of 06/27/22   CBC with Auto Differential   Result Value Ref Range    WBC 7.7 4.0 - 10.5 K/CU MM    RBC 3.51 (L) 4.2 - 5.4 M/CU MM    Hemoglobin 9.6 (L) 12.5 - 16.0 GM/DL    Hematocrit 31.9 (L) 37 - 47 %    MCV 90.9 78 - 100 FL    MCH 27.4 27 - 31 PG    MCHC 30.1 (L) 32.0 - 36.0 %    RDW 16.7 (H) 11.7 - 14.9 %    Platelets 213 603 - 406 K/CU MM    MPV 10.8 7.5 - 11.1 FL    Differential Type AUTOMATED DIFFERENTIAL     Segs Relative 72.5 (H) 36 - 66 %    Lymphocytes % 12.2 (L) 24 - 44 %    Monocytes % 9.1 (H) 0 - 4 %    Eosinophils % 4.9 (H) 0 - 3 %    Basophils % 0.9 0 - 1 %    Segs Absolute 5.6 K/CU MM    Lymphocytes Absolute 0.9 K/CU MM    Monocytes Absolute 0.7 K/CU MM    Eosinophils Absolute 0.4 K/CU MM    Basophils Absolute 0.1 K/CU MM    Nucleated RBC % 0.0 %    Total Nucleated RBC 0.0 K/CU MM    Total Immature Neutrophil 0.03 K/CU MM    Immature Neutrophil % 0.4 0 - 0.43 %   Comprehensive Metabolic Panel w/ Reflex to MG   Result Value Ref Range    Sodium 141 135 - 145 MMOL/L    Potassium 5.0 3.5 - 5.1 MMOL/L    Chloride 105 99 - 110 mMol/L    CO2 25 21 - 32 MMOL/L    BUN 42 (H) 6 - 23 MG/DL    CREATININE 0.9 0.6 - 1.1 MG/DL    Glucose 127 (H) 70 - 99 MG/DL    Calcium 9.3 8.3 - 10.6 MG/DL    Albumin 4.2 3.4 - 5.0 GM/DL    Total Protein 7.3 6.4 - 8.2 GM/DL    Total Bilirubin 0.3 0.0 - 1.0 MG/DL    ALT 21 10 - 40 U/L    AST 24 15 - 37 IU/L    Alkaline Phosphatase 92 40 - 128 IU/L    GFR Non-African American >60 >60 mL/min/1.73m2    GFR African American >60 >60 mL/min/1.73m2    Anion Gap 11 4 - 16   Troponin   Result Value Ref Range    Troponin T 0.013 (H) <0.01 NG/ML   Brain Natriuretic Peptide   Result Value Ref Range    Pro-BNP 11,691 (H) <300 PG/ML   Urinalysis with Microscopic   Result Value Ref Range    Color, UA YELLOW YELLOW    Clarity, UA CLEAR CLEAR    Glucose, Urine NEGATIVE NEGATIVE MG/DL    Bilirubin Urine NEGATIVE NEGATIVE MG/DL    Ketones, Urine NEGATIVE NEGATIVE MG/DL    Specific Gravity, UA 1.010 1.001 - 1.035    Blood, Urine NEGATIVE NEGATIVE    pH, Urine 6.5 5.0 - 8.0    Protein, UA NEGATIVE NEGATIVE MG/DL    Urobilinogen, Urine 0.2 0.2 - 1.0 MG/DL    Nitrite Urine, Quantitative NEGATIVE NEGATIVE    Leukocyte Esterase, Urine NEGATIVE NEGATIVE    RBC, UA NONE SEEN 0 - 6 /HPF    WBC, UA NONE SEEN 0 - 5 /HPF    Bacteria, UA NEGATIVE NEGATIVE /HPF    Trichomonas, UA NONE SEEN NONE SEEN /HPF    Hyaline Casts, UA 0 /LPF   Lactate, Sepsis   Result Value Ref Range    Lactic Acid, Sepsis 0.9 0.5 - 1.9 mMOL/L     Radiographs:  XR CHEST (2 VW)    Result Date: 6/27/2022  EXAMINATION: TWO XRAY VIEWS OF THE CHEST 6/27/2022 11:28 am COMPARISON: 05/28/2022 HISTORY: ORDERING SYSTEM PROVIDED HISTORY: dyspnea TECHNOLOGIST PROVIDED HISTORY: Reason for exam:->dyspnea Reason for Exam: dyspnea FINDINGS: Left basilar opacity again noted. There is no effusion or pneumothorax. The cardiomediastinal silhouette is stable. The osseous structures are stable. Left basilar opacity similar to the previous exam suggesting scarring or atelectasis Otherwise, no acute cardiopulmonary process       Procedures/EKG:   Patient's EKG is interpreted by me sinus rhythm rate 64   no ST elevation no ST depression I appreciate no acute ischemia or infarction on this EKG unchanged compared to patient's previous EKG from 8/28/2022    ED Course and MDM   In brief, Brian Lopez is a 80 y.o. female who presented to the emergency department for evaluation of shortness of breath. Based the patient's history and physical would be concerned about possible congestive heart failure of the possibilities patient symptoms do include aortic stenosis, pulmonary hypertension. Lower clinical suspicion for infectious etiology given the time course patient symptoms that she says has been ongoing for months and just worsened over the past several days. Other incremental diseases such as coronary artery disease or pulmonary fibrosis would be of concern as well    I did review patient's imaging studies and laboratory work as noted above. Patient does have an elevated BNP of approximately 11,700. Troponin is detectable at 0.013. Although she denies any chest pain. She was given 20 mg of Lasix. I did discuss the case with her cardiologist Dr. Damien Munoz, who will evaluate the patient here in the emergency department. He confirmed that patient does have significant aortic stenosis. Recommended giving the patient a total of 40 of Lasix IV. She was given another 20 mg of Lasix. Patient is to be admitted to the hospital for diuresis. I did discuss the case with the hospital service will admit the patient for further evaluation and treatment. After patient was seen by cardiology.   He did recommend the patient be transferred to [facility] where she has been seen and evaluated for possible TAVR. Dr. Herbert Stokes had spoken with Dr. Jad Solomon, who is accepted the patient in transfer to [facility] at this time. Bed is not currently available at [facility].  Discussions were made with his primary care physician Dr. Baljeet Liz. Patient will be admitted until a bed becomes available at outside hospital    ED Medication Orders (From admission, onward)    Start Ordered     Status Ordering Provider    06/27/22 1245 06/27/22 1244  furosemide (LASIX) injection 20 mg  ONCE         Acknowledged MIKAELA PIERCE          Final Impression      1. Aortic stenosis, severe    2.  Congestive heart failure, unspecified HF chronicity, unspecified heart failure type ([address]      DISPOSITION           (Please note that portions of this note may have been completed with a voice recognition program. Efforts were made to edit the dictations but occasionally words are mis-transcribed.)    Yunier Raya MD  [id number] MD  06/28/22 5379

## 2022-06-27 NOTE — ED TRIAGE NOTES
Pt presents to the ED with c/o SOB that has been going on \"for a few days. \" Pt is scheduled to have a valve replaced in July.

## 2022-06-27 NOTE — PROGRESS NOTES
Chart reviewed and discussed with ED doc. Patient seen at [facility] for TAVR evaluation and it appears Dr Kevin Ellis is patients PCP. Plans for patient to be transferred to [facility] for treatment and Dr Kevin Ellis to assume care in [facility] ED as PCP.

## 2022-06-27 NOTE — ED NOTES
1247 paged hospitalist     Uzma Chaudhary  06/27/22 1240  [facility] returned call      Uzma Chaudhary  06/27/22

## 2022-06-28 VITALS
HEART RATE: 66 BPM | RESPIRATION RATE: 18 BRPM | WEIGHT: 92 LBS | TEMPERATURE: 97.9 F | BODY MASS INDEX: 15.33 KG/M2 | OXYGEN SATURATION: 97 % | DIASTOLIC BLOOD PRESSURE: 75 MMHG | SYSTOLIC BLOOD PRESSURE: 130 MMHG | HEIGHT: 65 IN

## 2022-06-28 LAB
ANION GAP SERPL CALCULATED.3IONS-SCNC: 13 MMOL/L (ref 4–16)
BASOPHILS ABSOLUTE: 0.1 K/CU MM
BASOPHILS RELATIVE PERCENT: 0.7 % (ref 0–1)
BUN BLDV-MCNC: 33 MG/DL (ref 6–23)
CALCIUM SERPL-MCNC: 9.2 MG/DL (ref 8.3–10.6)
CHLORIDE BLD-SCNC: 101 MMOL/L (ref 99–110)
CO2: 30 MMOL/L (ref 21–32)
CREAT SERPL-MCNC: 0.8 MG/DL (ref 0.6–1.1)
CULTURE: ABNORMAL
CULTURE: ABNORMAL
DIFFERENTIAL TYPE: ABNORMAL
EOSINOPHILS ABSOLUTE: 0.4 K/CU MM
EOSINOPHILS RELATIVE PERCENT: 6.2 % (ref 0–3)
GFR AFRICAN AMERICAN: >60 ML/MIN/1.73M2
GFR NON-AFRICAN AMERICAN: >60 ML/MIN/1.73M2
GLUCOSE BLD-MCNC: 85 MG/DL (ref 70–99)
HCT VFR BLD CALC: 32.3 % (ref 37–47)
HEMOGLOBIN: 9.8 GM/DL (ref 12.5–16)
IMMATURE NEUTROPHIL %: 0.1 % (ref 0–0.43)
LYMPHOCYTES ABSOLUTE: 1.1 K/CU MM
LYMPHOCYTES RELATIVE PERCENT: 15.4 % (ref 24–44)
Lab: ABNORMAL
MCH RBC QN AUTO: 26.8 PG (ref 27–31)
MCHC RBC AUTO-ENTMCNC: 30.3 % (ref 32–36)
MCV RBC AUTO: 88.3 FL (ref 78–100)
MONOCYTES ABSOLUTE: 0.8 K/CU MM
MONOCYTES RELATIVE PERCENT: 11.2 % (ref 0–4)
NUCLEATED RBC %: 0 %
PDW BLD-RTO: 16.4 % (ref 11.7–14.9)
PLATELET # BLD: 330 K/CU MM (ref 140–440)
PMV BLD AUTO: 10.9 FL (ref 7.5–11.1)
POTASSIUM SERPL-SCNC: 4.3 MMOL/L (ref 3.5–5.1)
RBC # BLD: 3.66 M/CU MM (ref 4.2–5.4)
SEGMENTED NEUTROPHILS ABSOLUTE COUNT: 4.7 K/CU MM
SEGMENTED NEUTROPHILS RELATIVE PERCENT: 66.4 % (ref 36–66)
SODIUM BLD-SCNC: 144 MMOL/L (ref 135–145)
SPECIMEN: ABNORMAL
TOTAL IMMATURE NEUTOROPHIL: 0.01 K/CU MM
TOTAL NUCLEATED RBC: 0 K/CU MM
WBC # BLD: 7.1 K/CU MM (ref 4–10.5)

## 2022-06-28 PROCEDURE — 6360000002 HC RX W HCPCS: Performed by: GENERAL PRACTICE

## 2022-06-28 PROCEDURE — 2700000000 HC OXYGEN THERAPY PER DAY

## 2022-06-28 PROCEDURE — 6370000000 HC RX 637 (ALT 250 FOR IP): Performed by: INTERNAL MEDICINE

## 2022-06-28 PROCEDURE — 6370000000 HC RX 637 (ALT 250 FOR IP): Performed by: GENERAL PRACTICE

## 2022-06-28 PROCEDURE — 94761 N-INVAS EAR/PLS OXIMETRY MLT: CPT

## 2022-06-28 PROCEDURE — 36415 COLL VENOUS BLD VENIPUNCTURE: CPT

## 2022-06-28 PROCEDURE — 85025 COMPLETE CBC W/AUTO DIFF WBC: CPT

## 2022-06-28 PROCEDURE — 80048 BASIC METABOLIC PNL TOTAL CA: CPT

## 2022-06-28 PROCEDURE — 6360000002 HC RX W HCPCS: Performed by: INTERNAL MEDICINE

## 2022-06-28 RX ADMIN — METOPROLOL TARTRATE 50 MG: 50 TABLET, FILM COATED ORAL at 08:16

## 2022-06-28 RX ADMIN — FUROSEMIDE 20 MG: 10 INJECTION, SOLUTION INTRAVENOUS at 08:36

## 2022-06-28 RX ADMIN — LISINOPRIL 5 MG: 5 TABLET ORAL at 08:16

## 2022-06-28 RX ADMIN — ENOXAPARIN SODIUM 30 MG: 100 INJECTION SUBCUTANEOUS at 08:16

## 2022-06-28 RX ADMIN — ATORVASTATIN CALCIUM 20 MG: 10 TABLET, FILM COATED ORAL at 08:16

## 2022-06-28 RX ADMIN — ASPIRIN 81 MG 81 MG: 81 TABLET ORAL at 08:16

## 2022-06-28 RX ADMIN — LEVOTHYROXINE SODIUM 100 MCG: 0.1 TABLET ORAL at 06:26

## 2022-06-28 NOTE — DISCHARGE SUMMARY
Jasmin Levi MD, Internal Medicine    [place] [facility]   [phone]   Patient ID  Aby Peralta   1940  [id number]          Admit date: 6/27/2022   Discharge date: 6/28/2022      Admitting Physician: Paola Tapia MD   Discharge Physician: Jose Harrell PA-C     Discharge Diagnoses:   Acute hypoxic respiratory failure: 2/2 aortic stenosis. Continue supplemental O2 and treat below. Will need home O2 eval before going home. Severe aortic stenosis: Follows at [facility] with Dr. Jeffery Lennox. Being transferred to [facility] for intervention once bed is available. Diuresed. HTN/HLD: CPM  Hypothyroidism: On Synthroid  Remote history of left-sided rib fractures    Patient Active Problem List   Diagnosis    Dyspnea    Nonrheumatic aortic valve stenosis    Nonrheumatic mitral valve stenosis    Severe aortic valve stenosis    Acute on chronic diastolic heart failure (HCC)    Acute diastolic CHF (congestive heart failure), NYHA class 4 ([address]       Discharged Condition: good    Hospital Course: The patient is a 80 y.o. female who presented to ER with increased dyspnea. She has a history of HTN, HLD, hypothyroidism, severe aortic stenosis. Patient reported since last Thursday she has had increased dyspnea on exertion as well as mild increase in lower extremity edema. She denies fever, chills, recent illness, cough, wheezing, chest pain, abdominal pain, GI or  issues. Breathing progressively worsened so patient came to the ER. In the ER, CXR shows a left basilar opacity similar to prior exam suggesting scarring or atelectasis, otherwise no acute cardiopulmonary process. proBNP was elevated at 11,691. Troponin slightly elevated at 0.013. She is anemic with hemoglobin of 9.6.  UA is negative. Patient was started on IV diuresis. Patient was admitted and cardiology was consulted. Cardiology recommends transfer to [facility] for aortic stenosis intervention.   The patient follows with Dr. Gardenia Loco at [facility]. [place] currently has no beds available. Patient admitted and diuresed. She later was trasferred to [facility] when bed became availabe for intervention of severe aortic stenosis. Relevant Investigations  See HPI    Disposition: Transfer [facility]      Activity: activity as tolerated  Diet: cardiac diet    Follow-up with Dr. Rafia Lemus in 5 days after DC from [address] [place] [address]: Electronically signed by Virgen Khan PA-C on 6/28/2022 at 4:31 PM  I have independently evaluated and examined this patient today. I have reviewed radiologic and biochemical tests on this patient. Management Plan is developed mutually with NEHEMIAH Adan. I have reviewed above note and agree with assessment and plan.    Time spent on discharge 35 minutes

## 2022-06-28 NOTE — PROGRESS NOTES
Patient has bed at [facility], room number 3322. Sandra Jones intake states that no nurse to nurse report needs to be called, all information was sent to them. Dr. Dale Cruz will be accepting patient. Dr. Gopal Delaney notified.

## 2022-06-28 NOTE — PLAN OF CARE
Problem: Discharge Planning  Goal: Discharge to home or other facility with appropriate resources  6/28/2022 1150 by Tania Fuller LPN  Outcome: Progressing  6/28/2022 0429 by Juarez Remy LPN  Outcome: Progressing     Problem: Nutrition Deficit:  Goal: Optimize nutritional status  Outcome: Progressing

## 2022-06-28 NOTE — PROGRESS NOTES
Daily Progress Note     Pt. Awake, alert and feeling ok  HR stable, NSR, BP stable  No CP, SOB improved today    Severe AS waiting for transfer  She is feeling better this am after diuresis  She may need home oxygen on d/c      Severe AS    To have TAVR at [facility]- was planning to intervene in about 3 weeks    She got SOB so came to [address] [place] [address] however    BNP elevated- on diuretics    Feeling better now but will get SOB with exertion still    Planning on transferring to [facility] once a bed becomes available to consider doing the TAVR early    Cont. Cardioprotective meds for now  Await transfer to [facility] and con. Med. Tx. For now  Will follow    [facility]-5/31/22  IMPRESSION:  1. Left main is patent. 2.  LAD has mild disease noted. 3.  Circ has mild disease noted. 4.  RCA ostium calcium noted. No dampening of pressure noted. Just  mild disease noted. 5.  Aortic valve is not crossed. 6.  There is a significant mitral annular calcification present. 7.  Right heart pressures are normal.  8.  The patient has nonobstructive coronary artery disease present. 9.  Significant mitral annular calcification noted. Echo-5/28/22  IMPRESSION:  1. No clot or thrombus noted in the left atrium or left atrial  appendage. 2.  Left atrium is moderately enlarged. 3.  Moderate mitral regurgitation noted. 4.  Mitral annular calcification noted. 5.  Posterior mitral leaflet is calcified and restricted. 6.  Anterior leaflet is moving. 7.  Severe aortic stenosis is noted. 8.  Ascending aorta is dilated also. PAST MEDICAL HISTORY:  Severe aortic stenosis noted, hypertension,  hyperlipidemia, dyspnea, heart failure, hypothyroidism present,  arthritis present, severe aortic stenosis.     PAST SURGICAL HISTORY:  History of carpal tunnel surgery.     SOCIAL HISTORY:  Does not smoke. Does not drink.     HOME MEDICATIONS:  She is on Lopressor, Lasix, aspirin, Synthroid,  Lipitor.     ALLERGIES:  CECLOR.   Objective:   /74   Pulse 68   Temp 98.1 °F (36.7 °C) (Oral)   Resp 18   Ht 5' 5\" (1.651 m)   Wt 92 lb (41.7 kg)   SpO2 97%   BMI 15.31 kg/m²   No intake or output data in the 24 hours ending 06/28/22 0954    Medications:   Scheduled Meds:   aspirin  81 mg Oral Daily    atorvastatin  20 mg Oral Daily    levothyroxine  100 mcg Oral Daily    metoprolol tartrate  50 mg Oral BID    sodium chloride flush  5-40 mL IntraVENous 2 times per day    enoxaparin  30 mg SubCUTAneous Daily    furosemide  20 mg IntraVENous BID    lisinopril  5 mg Oral Daily      Infusions:   sodium chloride        PRN Meds:  sodium chloride flush, sodium chloride, ondansetron **OR** ondansetron, polyethylene glycol, acetaminophen **OR** acetaminophen       Physical Exam:  Vitals:    06/28/22 0813   BP: 132/74   Pulse: 68   Resp: 18   Temp: 98.1 °F (36.7 °C)   SpO2: 97%        General: AAO, NAD  Chest: Nontender  Cardiac: First and Second Heart Sounds are Normal, No Murmurs or Gallops noted  Lungs:Clear to auscultation and percussion. Abdomen: Soft, NT, ND, +BS  Extremities: No clubbing, no edema  Vascular:  Equal 2+ peripheral pulses. Lab Data:  CBC:   Recent Labs     06/27/22  1055 06/28/22  0011   WBC 7.7 7.1   HGB 9.6* 9.8*   HCT 31.9* 32.3*   MCV 90.9 88.3    330     BMP:   Recent Labs     06/27/22  1055 06/28/22  0011    144   K 5.0 4.3    101   CO2 25 30   BUN 42* 33*   CREATININE 0.9 0.8     LIVER PROFILE:   Recent Labs     06/27/22  1055   AST 24   ALT 21   BILITOT 0.3   ALKPHOS 92     PT/INR: No results for input(s): PROTIME, INR in the last 72 hours. APTT: No results for input(s): APTT in the last 72 hours. BNP:  No results for input(s): BNP in the last 72 hours.       Assessment:  Patient Active Problem List    Diagnosis Date Noted    Acute on chronic diastolic heart failure ([facility] 06/27/2022    Acute diastolic CHF (congestive heart failure), NYHA class 4 ([facility] 06/27/2022    Severe aortic valve stenosis 05/31/2022    Nonrheumatic aortic valve stenosis 05/30/2022    Nonrheumatic mitral valve stenosis 05/30/2022    Dyspnea 05/28/2022       Electronically signed by Zac Rodriguez PA-C on 6/28/2022 at 9:54 AM    Electronically signed by Angela Brennan MD on 6/28/22 at 11:23 AM EDT

## 2022-06-28 NOTE — PROGRESS NOTES
Physician Progress Note      PATIENT:               Barbie Hutchins  CSN #:                  [id number]  :                       1940  ADMIT DATE:       2022 10:12 AM  DISCH DATE:  RESPONDING  PROVIDER #:        Moraima Garcia MD          QUERY TEXT:    Pt admitted with aortic stenosis and has CHF documented in the ED. If   possible, please document in progress notes and discharge summary further   specificity regarding the type and acuity of CHF:      The medical record reflects the following:  Risk Factors: Aortic stenosis, CHF  Clinical Indicators: BNP of approximately 11,700, SOB, Bilateral leg edema  Treatment: Recommended giving the patient a total of 40 of Lasix IV. She was   given another 20 mg of Lasix.   Patient is to be admitted to the hospital for   diuresis, CARDS consult, O2 NC, metoprolol tartrate, aspirin, furosemide  20   MG tablet home dose    Thank you Claudia Bullard RN, CDS ([phone])  Options provided:  -- Acute on Chronic Systolic CHF/HFrEF  -- Acute on Chronic Diastolic CHF/HFpEF  -- Acute on Chronic Systolic and Diastolic CHF  -- Acute Systolic CHF/HFrEF  -- Acute Diastolic CHF/HFpEF  -- Acute Systolic and Diastolic CHF  -- Chronic Systolic CHF/HFrEF  -- Chronic Diastolic CHF/HFpEF  -- Chronic Systolic and Diastolic CHF  -- CHF ruled out  -- Other - I will add my own diagnosis  -- Disagree - Not applicable / Not valid  -- Disagree - Clinically unable to determine / Unknown  -- Refer to Clinical Documentation Reviewer    PROVIDER RESPONSE TEXT:    Severe Aortic stenosis -leading dyspnea -severe valevular heart disease    Query created by: Sandy Moser on 2022 9:00 AM      Electronically signed by:  Moraima Garcia MD 2022 2:35 PM

## 2022-06-28 NOTE — PROGRESS NOTES
Bed # 8899 ready [facility], patient informed and voiced understanding, Patient to be transported by [facility] transport within the hour.

## 2022-06-28 NOTE — PLAN OF CARE
Problem: Discharge Planning  Goal: Discharge to home or other facility with appropriate resources  6/28/2022 0429 by Ernesto Brown LPN  Outcome: Progressing  6/27/2022 1844 by Maddie Ordonez RN  Outcome: Progressing  Flowsheets (Taken 6/27/2022 1843)  Discharge to home or other facility with appropriate resources:   Identify barriers to discharge with patient and caregiver   Identify discharge learning needs (meds, wound care, etc)   Arrange for needed discharge resources and transportation as appropriate

## 2022-06-28 NOTE — PROGRESS NOTES
Comprehensive Nutrition Assessment    Type and Reason for Visit:  Initial (Low BMI for age)    Nutrition Recommendations/Plan:   1. Begin Standard High Calorie/High Protein oral nutrition supplement TID. 2. Will perform full NFPE at follow up   3. If appropriate, will trial for diet education prior to discharge   4. Please document PO intakes, fluids, and weights dos      Malnutrition Assessment:  Malnutrition Status: At risk for malnutrition (Comment) (06/28/22 1138)    Context:  Acute Illness     Findings of the 6 clinical characteristics of malnutrition:  Energy Intake:  Unable to assess  Weight Loss:  Greater than 2% over 1 week (8%)     Body Fat Loss:   (Predicted s/s Low BMI)     Muscle Mass Loss:   (Predicted s/s Low BMI)    Fluid Accumulation:  No significant fluid accumulation     Strength:  Not Performed    Nutrition Assessment:    Admitted with severe aortic stenosis, acute on chronic diastolic heart failure. Hx of increased swelling x 1 week PTA, increased fatigue in 1 year. Pt on low sodium diet, NIMA appetite, no po intakes yet documented, significant unintentional wt loss x 1 week, unsure if fluid losses as pt with plans for diuresis. Plans to await transfer to [facility] for aortic stenosis intervention. Follow as high nutrition risk. Nutrition Related Findings:    Had BM today. Rx: lasix Wound Type: None       Current Nutrition Intake & Therapies:    Average Meal Intake: Unable to assess  Average Supplements Intake: None Ordered  ADULT DIET; Regular; Low Sodium (2 gm)    Anthropometric Measures:  Height: 5' 5\" (165.1 cm)  Ideal Body Weight (IBW): 125 lbs (57 kg)    Admission Body Weight: 91 lb 14.9 oz (41.7 kg)  Current Body Weight: 91 lb 14.9 oz (41.7 kg), 73.5 % IBW.     Current BMI (kg/m2): 15.3  Usual Body Weight: 100 lb 1.4 oz (45.4 kg) (6/20/22)  % Weight Change (Calculated): -8.1  Weight Adjustment For: No Adjustment                 BMI Categories: Underweight (BMI less than 25) age over 72    Estimated Daily Nutrient Needs:  Energy Requirements Based On: Kcal/kg  Weight Used for Energy Requirements: Current  Energy (kcal/day): 6524-7595 (30-35 kcals/kg)  Weight Used for Protein Requirements: Ideal  Protein (g/day): 68 (at least 1.2 g/kg)  Method Used for Fluid Requirements: Other (Comment)  Fluid (ml/day): at least 1500, or per MD    Nutrition Diagnosis:   · Unintended weight loss related to catabolic illness,cardiac dysfunction,inadequate protein-energy intake as evidenced by BMI,weight loss greater than or equal to 2% in 1 week    Nutrition Interventions:   Food and/or Nutrient Delivery: Continue Current Diet,Start Oral Nutrition Supplement  Nutrition Education/Counseling: Education needed (Heart failure for the undernourished, will provide prior to d/c)  Coordination of Nutrition Care: Continue to monitor while inpatient       Goals:     Goals: PO intake 50% or greater,by next RD assessment,Teach back diet education,prior to discharge       Nutrition Monitoring and Evaluation:   Behavioral-Environmental Outcomes: None Identified  Food/Nutrient Intake Outcomes: Food and Nutrient Intake,Supplement Intake,Diet Advancement/Tolerance,IVF Intake  Physical Signs/Symptoms Outcomes: Biochemical Data,Nausea or Vomiting,Fluid Status or Edema,Meal Time Behavior,Nutrition Focused Physical Findings,Weight    Discharge Planning:     Too soon to determine     Costella Jam, ROSA, LD  Contact: [phone]

## 2022-06-28 NOTE — PROGRESS NOTES
Spoke with Mame Jaffe at [facility] intake regarding bed status for patient. No beds available at this time, stated she would call if one becomes available. Updated vitals and labs given to Mame Jaffe.      Critical access hospital Intake phone number [phone]     CHARLINE Rosenthal, RN

## 2022-06-30 LAB
EKG ATRIAL RATE: 64 BPM
EKG DIAGNOSIS: NORMAL
EKG P AXIS: 98 DEGREES
EKG P-R INTERVAL: 154 MS
EKG Q-T INTERVAL: 446 MS
EKG QRS DURATION: 82 MS
EKG QTC CALCULATION (BAZETT): 460 MS
EKG R AXIS: -15 DEGREES
EKG T AXIS: 49 DEGREES
EKG VENTRICULAR RATE: 64 BPM

## 2022-06-30 PROCEDURE — 93010 ELECTROCARDIOGRAM REPORT: CPT | Performed by: INTERNAL MEDICINE

## 2022-07-02 LAB
CULTURE: NORMAL
CULTURE: NORMAL
Lab: NORMAL
Lab: NORMAL
SPECIMEN: NORMAL
SPECIMEN: NORMAL

## 2022-07-06 NOTE — PROGRESS NOTES
diagnosis  -- Disagree - Not applicable / Not valid  -- Disagree - Clinically unable to determine / Unknown  -- Refer to Clinical Documentation Reviewer    PROVIDER RESPONSE TEXT:    This patient is in acute respiratory failure as evidenced by hypoxia    Query created by: Daxa Acuña on 7/1/2022 3:50 PM      Electronically signed by:  Blank Rich PA-C 7/6/2022 12:31 PM

## 2023-02-06 ENCOUNTER — HOSPITAL ENCOUNTER (INPATIENT)
Age: 83
LOS: 4 days | Discharge: HOME HEALTH | End: 2023-02-10
Attending: STUDENT IN AN ORGANIZED HEALTH CARE EDUCATION/TRAINING PROGRAM | Admitting: GENERAL PRACTICE
Payer: MEDICARE

## 2023-02-06 ENCOUNTER — APPOINTMENT (OUTPATIENT)
Dept: GENERAL RADIOLOGY | Age: 83
End: 2023-02-06
Payer: MEDICARE

## 2023-02-06 DIAGNOSIS — I50.9 CONGESTIVE HEART FAILURE, UNSPECIFIED HF CHRONICITY, UNSPECIFIED HEART FAILURE TYPE (HCC): ICD-10-CM

## 2023-02-06 DIAGNOSIS — R93.89 ABNORMAL CT SCAN: ICD-10-CM

## 2023-02-06 DIAGNOSIS — R06.00 DYSPNEA AND RESPIRATORY ABNORMALITIES: Primary | ICD-10-CM

## 2023-02-06 DIAGNOSIS — K92.2 UPPER GI BLEED: ICD-10-CM

## 2023-02-06 DIAGNOSIS — R06.89 DYSPNEA AND RESPIRATORY ABNORMALITIES: Primary | ICD-10-CM

## 2023-02-06 PROBLEM — I50.33 ACUTE ON CHRONIC DIASTOLIC HF (HEART FAILURE) (HCC): Status: ACTIVE | Noted: 2023-02-06

## 2023-02-06 PROBLEM — I50.1: Status: ACTIVE | Noted: 2023-02-06

## 2023-02-06 LAB
ALBUMIN SERPL-MCNC: 4 GM/DL (ref 3.4–5)
ALP BLD-CCNC: 80 IU/L (ref 40–129)
ALT SERPL-CCNC: 27 U/L (ref 10–40)
ANION GAP SERPL CALCULATED.3IONS-SCNC: 9 MMOL/L (ref 4–16)
APTT: 36.2 SECONDS (ref 25.1–37.1)
AST SERPL-CCNC: 41 IU/L (ref 15–37)
BASE EXCESS MIXED: 4.9 (ref 0–2.3)
BASOPHILS ABSOLUTE: 0 K/CU MM
BASOPHILS RELATIVE PERCENT: 0.5 % (ref 0–1)
BILIRUB SERPL-MCNC: 0.5 MG/DL (ref 0–1)
BUN SERPL-MCNC: 17 MG/DL (ref 6–23)
CALCIUM SERPL-MCNC: 9.2 MG/DL (ref 8.3–10.6)
CHLORIDE BLD-SCNC: 106 MMOL/L (ref 99–110)
CO2: 28 MMOL/L (ref 21–32)
COMMENT: ABNORMAL
CREAT SERPL-MCNC: 0.6 MG/DL (ref 0.6–1.1)
DIFFERENTIAL TYPE: ABNORMAL
EOSINOPHILS ABSOLUTE: 0.1 K/CU MM
EOSINOPHILS RELATIVE PERCENT: 1.2 % (ref 0–3)
FERRITIN: 73 NG/ML (ref 15–150)
GFR SERPL CREATININE-BSD FRML MDRD: >60 ML/MIN/1.73M2
GLUCOSE SERPL-MCNC: 134 MG/DL (ref 70–99)
HCO3 VENOUS: 31.4 MMOL/L (ref 19–25)
HCT VFR BLD CALC: 29.7 % (ref 37–47)
HEMOGLOBIN: 9.1 GM/DL (ref 12.5–16)
IMMATURE NEUTROPHIL %: 0.4 % (ref 0–0.43)
INR BLD: 1.26 INDEX
IRON: 382 UG/DL (ref 37–145)
LACTATE: 0.9 MMOL/L (ref 0.5–1.9)
LV EF: 55 %
LVEF MODALITY: NORMAL
LYMPHOCYTES ABSOLUTE: 0.7 K/CU MM
LYMPHOCYTES RELATIVE PERCENT: 9 % (ref 24–44)
MAGNESIUM: 2.2 MG/DL (ref 1.8–2.4)
MCH RBC QN AUTO: 26.9 PG (ref 27–31)
MCHC RBC AUTO-ENTMCNC: 30.6 % (ref 32–36)
MCV RBC AUTO: 87.9 FL (ref 78–100)
MONOCYTES ABSOLUTE: 0.7 K/CU MM
MONOCYTES RELATIVE PERCENT: 8.5 % (ref 0–4)
NUCLEATED RBC %: 0 %
O2 SAT, VEN: 87.4 % (ref 50–70)
PCO2, VEN: 53 MMHG (ref 38–52)
PCT TRANSFERRIN: 96 % (ref 10–44)
PDW BLD-RTO: 17 % (ref 11.7–14.9)
PH VENOUS: 7.38 (ref 7.32–7.42)
PLATELET # BLD: 262 K/CU MM (ref 140–440)
PMV BLD AUTO: 10.8 FL (ref 7.5–11.1)
PO2, VEN: 61 MMHG (ref 28–48)
POTASSIUM SERPL-SCNC: 4.2 MMOL/L (ref 3.5–5.1)
PRO-BNP: 9357 PG/ML
PROTHROMBIN TIME: 16.3 SECONDS (ref 11.7–14.5)
RAPID INFLUENZA  B AGN: NEGATIVE
RAPID INFLUENZA A AGN: NEGATIVE
RBC # BLD: 3.38 M/CU MM (ref 4.2–5.4)
SARS-COV-2 RDRP RESP QL NAA+PROBE: NOT DETECTED
SEGMENTED NEUTROPHILS ABSOLUTE COUNT: 6.5 K/CU MM
SEGMENTED NEUTROPHILS RELATIVE PERCENT: 80.4 % (ref 36–66)
SODIUM BLD-SCNC: 143 MMOL/L (ref 135–145)
T4 FREE SERPL-MCNC: 1.34 NG/DL (ref 0.9–1.8)
TOTAL IMMATURE NEUTOROPHIL: 0.03 K/CU MM
TOTAL IRON BINDING CAPACITY: 398 UG/DL (ref 250–450)
TOTAL NUCLEATED RBC: 0 K/CU MM
TOTAL PROTEIN: 7.2 GM/DL (ref 6.4–8.2)
TROPONIN T: 0.03 NG/ML
TROPONIN T: 0.04 NG/ML
TSH SERPL DL<=0.005 MIU/L-ACNC: 1.88 UIU/ML (ref 0.27–4.2)
UNSATURATED IRON BINDING CAPACITY: 16 UG/DL (ref 110–370)
WBC # BLD: 8.1 K/CU MM (ref 4–10.5)

## 2023-02-06 PROCEDURE — 2580000003 HC RX 258: Performed by: NURSE PRACTITIONER

## 2023-02-06 PROCEDURE — 83540 ASSAY OF IRON: CPT

## 2023-02-06 PROCEDURE — 36415 COLL VENOUS BLD VENIPUNCTURE: CPT

## 2023-02-06 PROCEDURE — 6360000002 HC RX W HCPCS: Performed by: PHYSICIAN ASSISTANT

## 2023-02-06 PROCEDURE — 6370000000 HC RX 637 (ALT 250 FOR IP): Performed by: PHYSICIAN ASSISTANT

## 2023-02-06 PROCEDURE — 82728 ASSAY OF FERRITIN: CPT

## 2023-02-06 PROCEDURE — 84484 ASSAY OF TROPONIN QUANT: CPT

## 2023-02-06 PROCEDURE — 1200000000 HC SEMI PRIVATE

## 2023-02-06 PROCEDURE — 83880 ASSAY OF NATRIURETIC PEPTIDE: CPT

## 2023-02-06 PROCEDURE — 83550 IRON BINDING TEST: CPT

## 2023-02-06 PROCEDURE — 6370000000 HC RX 637 (ALT 250 FOR IP): Performed by: INTERNAL MEDICINE

## 2023-02-06 PROCEDURE — 80053 COMPREHEN METABOLIC PANEL: CPT

## 2023-02-06 PROCEDURE — 94761 N-INVAS EAR/PLS OXIMETRY MLT: CPT

## 2023-02-06 PROCEDURE — 84443 ASSAY THYROID STIM HORMONE: CPT

## 2023-02-06 PROCEDURE — 6370000000 HC RX 637 (ALT 250 FOR IP): Performed by: NURSE PRACTITIONER

## 2023-02-06 PROCEDURE — 71045 X-RAY EXAM CHEST 1 VIEW: CPT

## 2023-02-06 PROCEDURE — 87635 SARS-COV-2 COVID-19 AMP PRB: CPT

## 2023-02-06 PROCEDURE — 99285 EMERGENCY DEPT VISIT HI MDM: CPT

## 2023-02-06 PROCEDURE — 83605 ASSAY OF LACTIC ACID: CPT

## 2023-02-06 PROCEDURE — 93306 TTE W/DOPPLER COMPLETE: CPT

## 2023-02-06 PROCEDURE — 6360000002 HC RX W HCPCS: Performed by: NURSE PRACTITIONER

## 2023-02-06 PROCEDURE — 87804 INFLUENZA ASSAY W/OPTIC: CPT

## 2023-02-06 PROCEDURE — 82805 BLOOD GASES W/O2 SATURATION: CPT

## 2023-02-06 PROCEDURE — 84439 ASSAY OF FREE THYROXINE: CPT

## 2023-02-06 PROCEDURE — 85025 COMPLETE CBC W/AUTO DIFF WBC: CPT

## 2023-02-06 PROCEDURE — 93005 ELECTROCARDIOGRAM TRACING: CPT | Performed by: EMERGENCY MEDICINE

## 2023-02-06 PROCEDURE — 85610 PROTHROMBIN TIME: CPT

## 2023-02-06 PROCEDURE — 85730 THROMBOPLASTIN TIME PARTIAL: CPT

## 2023-02-06 PROCEDURE — 83735 ASSAY OF MAGNESIUM: CPT

## 2023-02-06 RX ORDER — ACETAMINOPHEN 500 MG
1000 TABLET ORAL EVERY MORNING
COMMUNITY

## 2023-02-06 RX ORDER — POLYETHYLENE GLYCOL 3350 17 G/17G
17 POWDER, FOR SOLUTION ORAL DAILY PRN
Status: DISCONTINUED | OUTPATIENT
Start: 2023-02-06 | End: 2023-02-10 | Stop reason: HOSPADM

## 2023-02-06 RX ORDER — LOSARTAN POTASSIUM 25 MG/1
50 TABLET ORAL 2 TIMES DAILY
Status: DISCONTINUED | OUTPATIENT
Start: 2023-02-06 | End: 2023-02-09

## 2023-02-06 RX ORDER — HYDRALAZINE HYDROCHLORIDE 50 MG/1
50 TABLET, FILM COATED ORAL EVERY 8 HOURS SCHEDULED
Status: DISCONTINUED | OUTPATIENT
Start: 2023-02-06 | End: 2023-02-08

## 2023-02-06 RX ORDER — ATORVASTATIN CALCIUM 10 MG/1
20 TABLET, FILM COATED ORAL NIGHTLY
Status: DISCONTINUED | OUTPATIENT
Start: 2023-02-06 | End: 2023-02-10 | Stop reason: HOSPADM

## 2023-02-06 RX ORDER — ACETAMINOPHEN 500 MG
1000 TABLET ORAL EVERY MORNING
Status: DISCONTINUED | OUTPATIENT
Start: 2023-02-07 | End: 2023-02-10 | Stop reason: HOSPADM

## 2023-02-06 RX ORDER — MAGNESIUM SULFATE IN WATER 40 MG/ML
2000 INJECTION, SOLUTION INTRAVENOUS PRN
Status: DISCONTINUED | OUTPATIENT
Start: 2023-02-06 | End: 2023-02-10 | Stop reason: HOSPADM

## 2023-02-06 RX ORDER — ACETAMINOPHEN 650 MG/1
650 SUPPOSITORY RECTAL EVERY 6 HOURS PRN
Status: DISCONTINUED | OUTPATIENT
Start: 2023-02-06 | End: 2023-02-10 | Stop reason: HOSPADM

## 2023-02-06 RX ORDER — ONDANSETRON 4 MG/1
4 TABLET, ORALLY DISINTEGRATING ORAL EVERY 8 HOURS PRN
Status: DISCONTINUED | OUTPATIENT
Start: 2023-02-06 | End: 2023-02-10 | Stop reason: HOSPADM

## 2023-02-06 RX ORDER — ASPIRIN 325 MG
325 TABLET ORAL ONCE
Status: COMPLETED | OUTPATIENT
Start: 2023-02-06 | End: 2023-02-06

## 2023-02-06 RX ORDER — AMLODIPINE BESYLATE 5 MG/1
5 TABLET ORAL DAILY
Status: DISCONTINUED | OUTPATIENT
Start: 2023-02-06 | End: 2023-02-06

## 2023-02-06 RX ORDER — SODIUM CHLORIDE 0.9 % (FLUSH) 0.9 %
5-40 SYRINGE (ML) INJECTION PRN
Status: DISCONTINUED | OUTPATIENT
Start: 2023-02-06 | End: 2023-02-10 | Stop reason: HOSPADM

## 2023-02-06 RX ORDER — SODIUM CHLORIDE 0.9 % (FLUSH) 0.9 %
5-40 SYRINGE (ML) INJECTION EVERY 12 HOURS SCHEDULED
Status: DISCONTINUED | OUTPATIENT
Start: 2023-02-06 | End: 2023-02-10 | Stop reason: HOSPADM

## 2023-02-06 RX ORDER — ATORVASTATIN CALCIUM 40 MG/1
40 TABLET, FILM COATED ORAL NIGHTLY
Status: DISCONTINUED | OUTPATIENT
Start: 2023-02-06 | End: 2023-02-06

## 2023-02-06 RX ORDER — FUROSEMIDE 10 MG/ML
40 INJECTION INTRAMUSCULAR; INTRAVENOUS 2 TIMES DAILY
Status: DISCONTINUED | OUTPATIENT
Start: 2023-02-06 | End: 2023-02-07

## 2023-02-06 RX ORDER — FUROSEMIDE 10 MG/ML
40 INJECTION INTRAMUSCULAR; INTRAVENOUS ONCE
Status: COMPLETED | OUTPATIENT
Start: 2023-02-06 | End: 2023-02-06

## 2023-02-06 RX ORDER — LEVOTHYROXINE SODIUM 0.1 MG/1
100 TABLET ORAL DAILY
Status: DISCONTINUED | OUTPATIENT
Start: 2023-02-07 | End: 2023-02-10 | Stop reason: HOSPADM

## 2023-02-06 RX ORDER — ONDANSETRON 2 MG/ML
4 INJECTION INTRAMUSCULAR; INTRAVENOUS EVERY 6 HOURS PRN
Status: DISCONTINUED | OUTPATIENT
Start: 2023-02-06 | End: 2023-02-10 | Stop reason: HOSPADM

## 2023-02-06 RX ORDER — NITROGLYCERIN 0.4 MG/1
0.4 TABLET SUBLINGUAL EVERY 5 MIN PRN
Status: DISCONTINUED | OUTPATIENT
Start: 2023-02-06 | End: 2023-02-10 | Stop reason: HOSPADM

## 2023-02-06 RX ORDER — SODIUM CHLORIDE 9 MG/ML
INJECTION, SOLUTION INTRAVENOUS PRN
Status: DISCONTINUED | OUTPATIENT
Start: 2023-02-06 | End: 2023-02-10 | Stop reason: HOSPADM

## 2023-02-06 RX ORDER — POTASSIUM CHLORIDE 7.45 MG/ML
10 INJECTION INTRAVENOUS PRN
Status: DISCONTINUED | OUTPATIENT
Start: 2023-02-06 | End: 2023-02-10 | Stop reason: HOSPADM

## 2023-02-06 RX ORDER — APIXABAN 2.5 MG/1
2.5 TABLET, FILM COATED ORAL 2 TIMES DAILY
COMMUNITY
Start: 2023-02-05

## 2023-02-06 RX ORDER — POTASSIUM CHLORIDE 20 MEQ/1
40 TABLET, EXTENDED RELEASE ORAL PRN
Status: DISCONTINUED | OUTPATIENT
Start: 2023-02-06 | End: 2023-02-10 | Stop reason: HOSPADM

## 2023-02-06 RX ORDER — ATORVASTATIN CALCIUM 10 MG/1
20 TABLET, FILM COATED ORAL DAILY
Status: DISCONTINUED | OUTPATIENT
Start: 2023-02-06 | End: 2023-02-06 | Stop reason: SDUPTHER

## 2023-02-06 RX ORDER — LOSARTAN POTASSIUM 100 MG/1
100 TABLET ORAL DAILY
Status: ON HOLD | COMMUNITY
Start: 2023-02-05 | End: 2023-02-09 | Stop reason: HOSPADM

## 2023-02-06 RX ORDER — AMLODIPINE BESYLATE 5 MG/1
5 TABLET ORAL 2 TIMES DAILY
Status: DISCONTINUED | OUTPATIENT
Start: 2023-02-07 | End: 2023-02-08

## 2023-02-06 RX ORDER — ACETAMINOPHEN 325 MG/1
650 TABLET ORAL EVERY 6 HOURS PRN
Status: DISCONTINUED | OUTPATIENT
Start: 2023-02-06 | End: 2023-02-10 | Stop reason: HOSPADM

## 2023-02-06 RX ADMIN — HYDRALAZINE HYDROCHLORIDE 50 MG: 50 TABLET, FILM COATED ORAL at 15:06

## 2023-02-06 RX ADMIN — HYDRALAZINE HYDROCHLORIDE 50 MG: 50 TABLET, FILM COATED ORAL at 20:05

## 2023-02-06 RX ADMIN — FUROSEMIDE 40 MG: 10 INJECTION, SOLUTION INTRAMUSCULAR; INTRAVENOUS at 20:02

## 2023-02-06 RX ADMIN — ATORVASTATIN CALCIUM 20 MG: 10 TABLET, FILM COATED ORAL at 20:04

## 2023-02-06 RX ADMIN — ASPIRIN 325 MG ORAL TABLET 325 MG: 325 PILL ORAL at 10:43

## 2023-02-06 RX ADMIN — AMLODIPINE BESYLATE 5 MG: 5 TABLET ORAL at 15:06

## 2023-02-06 RX ADMIN — IRON SUCROSE 200 MG: 20 INJECTION, SOLUTION INTRAVENOUS at 17:33

## 2023-02-06 RX ADMIN — LOSARTAN POTASSIUM 50 MG: 25 TABLET, FILM COATED ORAL at 20:04

## 2023-02-06 RX ADMIN — LOSARTAN POTASSIUM 50 MG: 25 TABLET, FILM COATED ORAL at 15:06

## 2023-02-06 RX ADMIN — SODIUM CHLORIDE, PRESERVATIVE FREE 10 ML: 5 INJECTION INTRAVENOUS at 20:05

## 2023-02-06 RX ADMIN — APIXABAN 2.5 MG: 2.5 TABLET, FILM COATED ORAL at 20:04

## 2023-02-06 RX ADMIN — FUROSEMIDE 40 MG: 10 INJECTION, SOLUTION INTRAMUSCULAR; INTRAVENOUS at 15:06

## 2023-02-06 ASSESSMENT — PAIN - FUNCTIONAL ASSESSMENT: PAIN_FUNCTIONAL_ASSESSMENT: NONE - DENIES PAIN

## 2023-02-06 NOTE — ED NOTES
Pt placed on 4 Lpm O2 via NC d/t O2 sat 90% on 2 Lpm. NEHEMIAH Ly at bedside.  Pt on cardiac monitor     Tono High RN  02/06/23 [id number]

## 2023-02-06 NOTE — ED NOTES
ED TO INPATIENT SBAR HANDOFF    Patient Name: Nakia Richard   :  1940  [de-identified] y.o. MRN:  [id number]  Preferred Name  [facility]  ED Room #:  ED33/ED-33  Family/Caregiver Present yes  Restraints no   Sitter no   Sepsis Risk Score Sepsis Risk Score: 1.07    Situation  Code Status: Prior No additional code details. Allergies: Ceclor [cefaclor]  Weight: Patient Vitals for the past 96 hrs (Last 3 readings):   Weight   23 1013 93 lb (42.2 kg)     Arrived from: home  Chief Complaint:   Chief Complaint   Patient presents with    Shortness of Breath     Pt denies any hx of COPD. Non smoker. Pt O2 sat in the 80's on room air. Pt 89-90%% on 2 Lpm O2 . No respiratory distress noted. Denies cough. Hospital Problem/Diagnosis:  Principal Problem:    Acute on chronic diastolic HF (heart failure) (HCC)  Resolved Problems:    * No resolved hospital problems. *    Imaging:   XR CHEST PORTABLE   Final Result   Findings consistent with diffuse bilateral mid-basilar pneumonia and/or   congestive heart failure accentuated by underlying chronic lung disease   without detectable significant pleural effusion.            Abnormal labs:   Abnormal Labs Reviewed   CBC WITH AUTO DIFFERENTIAL - Abnormal; Notable for the following components:       Result Value    RBC 3.38 (*)     Hemoglobin 9.1 (*)     Hematocrit 29.7 (*)     MCH 26.9 (*)     MCHC 30.6 (*)     RDW 17.0 (*)     Segs Relative 80.4 (*)     Lymphocytes % 9.0 (*)     Monocytes % 8.5 (*)     All other components within normal limits   COMPREHENSIVE METABOLIC PANEL - Abnormal; Notable for the following components:    Glucose 134 (*)     AST 41 (*)     All other components within normal limits   TROPONIN - Abnormal; Notable for the following components:    Troponin T 0.029 (*)     All other components within normal limits   BRAIN NATRIURETIC PEPTIDE - Abnormal; Notable for the following components:    Pro-BNP 9,357 (*)     All other components within normal limits   BLOOD GAS, VENOUS - Abnormal; Notable for the following components:    pCO2, Kike 53 (*)     pO2, Kike 61 (*)     Base Exc, Mixed 4.9 (*)     HCO3, Venous 31.4 (*)     O2 Sat, Kike 87.4 (*)     All other components within normal limits   PROTIME/INR & PTT - Abnormal; Notable for the following components:    Protime 16.3 (*)     All other components within normal limits     Critical values: Trop. 0.029, Pro-BNP 9,357     Abnormal Assessment Findings: pt requiring O2. Pt O2 sat mid 80's on room air. Pt not on O2 usually. Pt 89-90% on 2 Lpm. Pt on 4 Lpm O2 via NC    Background  History:   Past Medical History:   Diagnosis Date    Aortic stenosis     Arthritis     Hypertension        Assessment    Vitals/MEWS: MEWS Score: 6  Level of Consciousness: Alert (0)   Vitals:    02/06/23 1013 02/06/23 1021 02/06/23 1041 02/06/23 1151   BP: (!) 213/98  (!) 202/154 (!) 179/83   Pulse: (!) 38 61 57 55   Resp: 24 23 19 24   Temp: 98.8 °F (37.1 °C)      TempSrc: Oral      SpO2: (!) 89% 92% 96%    Weight: 93 lb (42.2 kg)      Height: 5' 5\" (1.651 m)        FiO2 (%): desaturation  O2 Flow Rate: O2 Device: Nasal cannula O2 Flow Rate (L/min): 4 L/min  Cardiac Rhythm:   Pain Assessment:  [x] Verbal [] Sherri Cressey Scale  Pain Scale: Pain Assessment  Pain Assessment: None - Denies Pain  Last documented pain score (0-10 scale)    Last documented pain medication administered: none  Mental Status: oriented and alert  NIH Score: NIH     C-SSRS: Risk of Suicide: No Risk  Bedside swallow:    Dora Coma Scale (GCS): Eidson Coma Scale  Eye Opening: Spontaneous  Best Verbal Response: Oriented  Best Motor Response: Obeys commands  Dora Coma Scale Score: 15  Active LDA's:   Peripheral IV 02/06/23 Right Antecubital (Active)   Site Assessment Clean, dry & intact 02/06/23 1037   Line Status Blood return noted; Flushed;Specimen collected;Normal saline locked 02/06/23 1037   Phlebitis Assessment No symptoms 02/06/23 1037   Infiltration Assessment 0 02/06/23 1037   Dressing Status New dressing applied 02/06/23 1037   Dressing Type Transparent 02/06/23 1037   Dressing Intervention New 02/06/23 1037     PO Status: Regular  Pertinent or High Risk Medications/Drips: no   o If Yes, please provide details:   Pending Blood Product Administration: no     You may also review the ED PT Care Timeline found under the Summary Nursing Index tab. Recommendation    Pending orders: possibly need to give lasix and norvasc if not given before pt leaves ED  Plan for Discharge (if known):    Additional Comments:   If any further questions, please call Sending RN at [phone]    Electronically signed by: Electronically signed by Consuelo Landry RN on 2/6/2023 at 12:27 PM     Consuelo Landry RN  02/06/23 7407

## 2023-02-06 NOTE — H&P
V2.0  History and Physical      Name:  Hailey Patient /Age/Sex: 1940  ([de-identified] y.o. female)   MRN & CSN:  [id number] & [id number] Encounter Date/Time: 2023 12:28 PM EST   Location:  [address] [place] [address]-A PCP: Jenifer Ortiz Day: 1    Assessment and Plan:   Hailey Patient is a [de-identified] y.o. female with a pmh of  Severe AS s/p TAVR 2022, Non-Rheumatic Mitral Valve Stenosis, Chronic Diastolic HF NYHA Class 4, HTN, HLD, Hypothyroidism, Arthritis, Pre-Diabetes,  who presents with Acute on chronic diastolic HF (heart failure) (HCC)    Acute on chronic diastolic heart failure, NYHA Class 4: BNP 9,357  She presented to ED today for worsening shortness of breath, especially with exertion, this problem started about 2 days ago following a sore throat and some mild congestion which are now resolved. She is updated on her COVID and flu vaccines. Does admit to eating a lot of fast food and processed foods which are high in sodium. Troponin 0.029, BNP is 9,357 and CXR shows findings consistent with diffuse bilateral mid basilar pneumonia and/or congestive heart failure, accentuated by underlying chronic lung disease without detectable significant pleural effusion. VBG shows pH normal 7.38. TSH 1.34, Free T4 1.88. Was given a dose of IV Lasix 40 mg in ED. Cardiology is on board, Dr. Antony Garza is her primary cardiologist. Cardiology recommends IV Lasix 40 mg, Hydralazine 50mg tid, Cozaar 50 mg bid and Norvasc 5 mg and to recheck ECHO. Cardiology recommends holding metoprolol due to bradycardia. EKG shows LBBB and LAD.    Will trend:  BNP, Troponin, CBC w/ diff and CMP  Will check Ferritin, Iron/TIBC, Lactate and Rapid Flu/Covid  ECHO 23: EF 55%, left ventricular systolic function is normal grade II Diastolic Dysfunction, severe mitral annular calcification, moderate to severe mitral regurgitation, severe tricuspid regurgitation, RVSP 60.13 mmHg: Severe Pulm HTN  Venofer  mg QD x 5 days Cardiology will follow and manage, cont IV Lasix 40 mg bid, losartan 50 mg bid, hydralazine 50 mg tid and amlodipine 5 mg qd   HTN  Continue losartan 50 mg twice daily and add hydralazine 50 mg 3 times daily and Norvasc 5 mg daily per cardiology  Hypothyroidism  Continue levothyroxine 100 mcg every morning, thyroid studies normal  Anemia  Hemoglobin 9.1 today, baseline 9.8-10.3  Hyperlipidemia  Continue atorvastatin 20 mg nightly        This patient was seen and examined in conjunction with Dr. Jackson Givens. He  was agreeable with the plan and management as dictated above. Hospital Problems             Last Modified POA    * (Principal) Acute on chronic diastolic HF (heart failure) ([facility] [address] 2/6/2023 Yes       Disposition:   Current Living situation: Home  Expected Disposition: Home  Estimated D/C: 2-3 days     Diet ADULT DIET; Regular; No Added Salt (3-4 gm)   DVT Prophylaxis [] Lovenox, []  Heparin, [] SCDs, [] Ambulation,  [x] Eliquis, [] Xarelto, [] Coumadin   Code Status Full Code   Surrogate Decision Maker/ POA Self, , Son      History from:     patient, electronic medical record    History of Present Illness:     Chief Complaint: Shortness of Breath, worse on exertion    Radha Lau is a [de-identified] y.o. female with pmh of Severe AS s/p TAVR July 2022, Non-Rheumatic Mitral Valve Stenosis, Chronic Diastolic HF NYHA Class 4, HTN, HLD, Hypothyroidism, Arthritis, Pre-Diabetes who presents with worsening shortness of breath, especially with exertion, this problem started about 2 days ago following a sore throat and some mild congestion which are now resolved. She is updated on her COVID and flu vaccines. Does admit to eating a lot of fast food and processed foods which are high in sodium.  Troponin 0.029, BNP is 9,357 and CXR shows findings consistent with diffuse bilateral mid basilar pneumonia and/or congestive heart failure, accentuated by underlying chronic lung disease without detectable significant pleural effusion. VBG shows pH normal 7.38. TSH 1.34, Free T4 1.88. Was given a dose of IV Lasix 40 mg in ED. Cardiology is on board, Dr. Italia Chavira is her primary cardiologist. Cardiology recommends IV Lasix 40 mg, Hydralazine 50mg tid, Cozaar 50 mg bid and Norvasc 5 mg and to recheck ECHO. Cardiology recommends holding metoprolol due to bradycardia. EKG shows LBBB and LAD. She looks comfortable on exam after having IV Lasix and Oxygen 2 l/nc, breathing is improved. Her appetite is good. Denies fevers, chills, body aches, cheat pain, lightheadedness or dizziness. She does admit to having a mild sore throat/congestion 2 days ago when her symptoms of dyspnea started. Review of Systems:     Ten point ROS reviewed and negative, unless otherwise noted above. Objective:   No intake or output data in the 24 hours ending 02/06/23 1735   Vitals:   Vitals:    02/06/23 1315 02/06/23 1405 02/06/23 1452 02/06/23 1629   BP: (!) 185/86  (!) 185/102 (!) 179/79   Pulse: 57 58 55    Resp: 14 23 22    Temp: 98.8 °F (37.1 °C)  97.8 °F (36.6 °C)    TempSrc:   Oral    SpO2: 94%  94%    Weight:       Height:           Medications Prior to Admission     Prior to Admission medications    Medication Sig Start Date End Date Taking?  Authorizing Provider   acetaminophen (TYLENOL) 500 MG tablet Take 1,000 mg by mouth every morning   Yes Historical Provider, MD   losartan (COZAAR) 100 MG tablet Take 100 mg by mouth daily 2/5/23   Historical Provider, MD   ELIQUIS 2.5 MG TABS tablet Take 2.5 mg by mouth 2 times daily 2/5/23   Historical Provider, MD   Ascorbic Acid (VITAMIN C PO) Take 1 tablet by mouth daily    Historical Provider, MD   Multiple Vitamin (MULTIVITAMIN ADULT PO) Take 1 tablet by mouth daily    Historical Provider, MD   metoprolol tartrate (LOPRESSOR) 50 MG tablet Take 1 tablet by mouth 2 times daily 5/31/22   Quentin Carbajal PA-C   furosemide (LASIX) 20 MG tablet Take 1 tablet by mouth daily 5/30/22   Charlotte Mcclain Davon Denny MD   levothyroxine (SYNTHROID) 100 MCG tablet Take 1 tablet by mouth daily 5/30/22   Tima Ham MD   atorvastatin (LIPITOR) 20 MG tablet Take 20 mg by mouth daily     Historical Provider, MD       Physical Exam:      General: NAD  Eyes: EOMI  ENT: neck supple  Cardiovascular: Regular rate and rhythm, normal S1/S2, no gallops, or murmurs   Respiratory: Clear to auscultation bilaterally, no wheezes, rhonchi or rales. Resp even and unlabored on 2 L/O2 per nc, +JVD  Gastrointestinal: Soft, non tender, no hepatomegaly or splenomegaly, BS x 4  Genitourinary: no suprapubic tenderness  Musculoskeletal: Trace edema in ankles/feet and hands   Skin: warm, dry  Neuro: Alert & oriented x 3   Psych: Mood appropriate. Past Medical History:   PMHx   Past Medical History:   Diagnosis Date    Aortic stenosis     Arthritis     Hypertension      PSHX:  has a past surgical history that includes Carpal tunnel release and Cosmetic surgery. Allergies: Allergies   Allergen Reactions    Ceclor [Cefaclor] Hives     Fam HX: family history is not on file.   Soc HX:   Social History     Socioeconomic History    Marital status:      Spouse name: Bee Finch     Number of children: 5    Years of education: None    Highest education level: None   Tobacco Use    Smoking status: Never    Smokeless tobacco: Current   Substance and Sexual Activity    Alcohol use: No    Drug use: No       Medications:   Medications:    hydrALAZINE  50 mg Oral 3 times per day    losartan  50 mg Oral BID    atorvastatin  20 mg Oral Nightly    [START ON 2/7/2023] acetaminophen  1,000 mg Oral QAM    apixaban  2.5 mg Oral BID    [START ON 2/7/2023] levothyroxine  100 mcg Oral Daily    sodium chloride flush  5-40 mL IntraVENous 2 times per day    furosemide  40 mg IntraVENous BID    iron sucrose  200 mg IntraVENous Q24H    [START ON 2/7/2023] amLODIPine  5 mg Oral BID      Infusions:    sodium chloride       PRN Meds: sodium chloride flush, 5-40 mL, PRN  sodium chloride, , PRN  ondansetron, 4 mg, Q8H PRN   Or  ondansetron, 4 mg, Q6H PRN  polyethylene glycol, 17 g, Daily PRN  acetaminophen, 650 mg, Q6H PRN   Or  acetaminophen, 650 mg, Q6H PRN  potassium chloride, 40 mEq, PRN   Or  potassium alternative oral replacement, 40 mEq, PRN   Or  potassium chloride, 10 mEq, PRN  magnesium sulfate, 2,000 mg, PRN  nitroGLYCERIN, 0.4 mg, Q5 Min PRN      Labs      CBC:   Recent Labs     02/06/23  1037   WBC 8.1   HGB 9.1*        BMP:    Recent Labs     02/06/23  1037      K 4.2      CO2 28   BUN 17   CREATININE 0.6   GLUCOSE 134*     Hepatic:   Recent Labs     02/06/23  1037   AST 41*   ALT 27   BILITOT 0.5   ALKPHOS 80     Lipids:   Lab Results   Component Value Date/Time    HDL 57 05/29/2022 02:34 AM     Hemoglobin A1C:   Lab Results   Component Value Date/Time    LABA1C 6.5 05/31/2022 08:18 AM     TSH: No results found for: TSH  Troponin:   Lab Results   Component Value Date/Time    TROPONINT 0.029 02/06/2023 10:37 AM    TROPONINT 0.013 06/27/2022 10:55 AM    TROPONINT 0.025 05/28/2022 11:12 AM     Lactic Acid: No results for input(s): LACTA in the last 72 hours.   BNP:   Recent Labs     02/06/23  1037   PROBNP 9,357*     UA:  Lab Results   Component Value Date/Time    NITRU NEGATIVE 06/27/2022 10:51 AM    COLORU YELLOW 06/27/2022 10:51 AM    WBCUA NONE SEEN 06/27/2022 10:51 AM    RBCUA NONE SEEN 06/27/2022 10:51 AM    TRICHOMONAS NONE SEEN 06/27/2022 10:51 AM    BACTERIA NEGATIVE 06/27/2022 10:51 AM    CLARITYU CLEAR 06/27/2022 10:51 AM    SPECGRAV 1.010 06/27/2022 10:51 AM    LEUKOCYTESUR NEGATIVE 06/27/2022 10:51 AM    UROBILINOGEN 0.2 06/27/2022 10:51 AM    BILIRUBINUR NEGATIVE 06/27/2022 10:51 AM    BLOODU NEGATIVE 06/27/2022 10:51 AM    KETUA NEGATIVE 06/27/2022 10:51 AM     Urine Cultures: No results found for: LABURIN  Blood Cultures: No results found for: BC  No results found for: BLOODCULT2  Organism: No results found for: ORG    Imaging/Diagnostics Last 24 Hours   XR CHEST PORTABLE    Result Date: 2/6/2023  EXAMINATION: ONE XRAY VIEW OF THE CHEST 2/6/2023 10:37 am COMPARISON: 06/27/2022 HISTORY: ORDERING SYSTEM PROVIDED HISTORY: sob TECHNOLOGIST PROVIDED HISTORY: Reason for exam:->sob Reason for Exam: sob FINDINGS: Infiltrative changes mid-lower lung fields bilaterally worst in the hilar and perihilar regions. Heart size is enlarged. Findings are consistent with diffuse bilateral mid-basilar pneumonia and/or congestive heart failure accentuated by underlying chronic lung disease. No large areas of pulmonary consolidation, detectable pleural effusion or pneumothorax. Findings consistent with diffuse bilateral mid-basilar pneumonia and/or congestive heart failure accentuated by underlying chronic lung disease without detectable significant pleural effusion.        Personally reviewed Lab Studies, Imaging, and discussed case with Dr. Gerson Mcfarlane    Electronically signed by SUHAIL Bueno CNP on 2/6/2023 at 5:35 PM

## 2023-02-06 NOTE — ED NOTES
Medication History  [facility]    Patient Name: Hailey Patient 1940     Medication history has been completed by: Manuel Pulliam CPhT    Source(s) of information: patient and insurance claims     [place] [address] [facility] [place] [facility]    Allergies as of 02/06/2023 - Fully Reviewed 02/06/2023   Allergen Reaction Noted    Ceclor [cefaclor] Hives 11/25/2018        Prior to Admission medications    Medication Sig Start Date End Date Taking? Authorizing Provider   acetaminophen (TYLENOL) 500 MG tablet Take 1,000 mg by mouth every morning   Yes Historical Provider, MD   losartan (COZAAR) 100 MG tablet Take 100 mg by mouth daily 2/5/23   Historical Provider, MD   ELIQUIS 2.5 MG TABS tablet Take 2.5 mg by mouth 2 times daily 2/5/23   Historical Provider, MD   Ascorbic Acid (VITAMIN C PO) Take 1 tablet by mouth daily    Historical Provider, MD   Multiple Vitamin (MULTIVITAMIN ADULT PO) Take 1 tablet by mouth daily    Historical Provider, MD   metoprolol tartrate (LOPRESSOR) 50 MG tablet Take 1 tablet by mouth 2 times daily 5/31/22   Quentin Carbajal PA-C   furosemide (LASIX) 20 MG tablet Take 1 tablet by mouth daily 5/30/22   Hasmukh Selby MD   levothyroxine (SYNTHROID) 100 MCG tablet Take 1 tablet by mouth daily 5/30/22   Hasmukh Selby MD   atorvastatin (LIPITOR) 20 MG tablet Take 20 mg by mouth daily     Historical Provider, MD     Medications added or changed (ex. new medication, dosage change, interval change, formulation change):  Eliquis (added)  Tylenol (added)  Losartan (added)    Medications removed from list (include reason, ex. noncompliance, medication cost, therapy complete etc.):   Tylenol pm not taking  Meloxicam not taking  Aspirin not taking    Comments:  Medication list reviewed with patient and insurance claims verified. Eliquis therapy updated, first dose taken today. Patient states she has taken first dose of medications today.     To my knowledge the above medication history is accurate as of 2/6/2023 11:31 AM.   Andrés Foss CPhT   2/6/2023 11:31 AM

## 2023-02-06 NOTE — CONSULTS
[address] [place] [address], [address] [facility]                                  CONSULTATION    PATIENT NAME: Phoenix Rothman                      :        1940  MED REC NO:   [id number]                          ROOM:       1072  ACCOUNT NO:   [de-identified]                           ADMIT DATE: 2023  PROVIDER:     Reggie Meckel, PA    CONSULT DATE:  2023    CHIEF COMPLAINT:  Shortness of breath and heart failure symptoms. HISTORY OF PRESENT ILLNESS:  This is an 82-year-old female whom we know  well from the office, who had a TAVR for severe aortic stenosis done in  2022 at [facility].  We have been following her at  the office since then. She did have a followup done in 10/2022 where it  showed that she had a mildly elevated blood pressure at 140 to 150 range  at home. So, her losartan was doubled to 100 mg. She also had  moderate-to-severe MR and echo that was done at [facility] in followup  after her TAVR. She was supposed to followup with them again in order  for a repeat echocardiogram to make sure that everything remained  stable. She is not sure if she has done that yet or not. She came in  because of worsening shortness of breath. It has been building up for  the last few days, but yesterday it got to the point where she was  sitting down and was gasping for air, but could not breathe well. She  denies any significant chest pain or palpitations. When she came in,  her blood pressure was quite elevated in the 200/100 range. PAST MEDICAL HISTORY:  Severe aortic stenosis, moderate-to-severe mitral  regurgitation, hypertension, hyperlipidemia, diastolic heart failure,  and arthritis. PAST SURGICAL HISTORY:  Carpal tunnel syndrome. She had a TAVR done in  2022 at [facility].    SOCIAL HISTORY:  She does not smoke. She does not drink. ALLERGIES:  [place].     HOME MEDICATIONS:  She was on losartan 100 mg daily. , Eliquis 2.5 mg  b.i.d., Lopressor 50 mg b.i.d., Lasix 20 mg daily, Synthroid, and  Lipitor 20 mg daily at bedtime. REVIEW OF SYSTEMS:  A 10-point review of systems is reviewed and  negative unless noted in the HPI. PHYSICAL EXAMINATION:  GENERAL:  Awake and alert female, sitting up in bed, in no acute  distress. HEENT:  Head:  Atraumatic and normocephalic. Eyes:  No scleral  erythema, discharge, or conjunctivitis noted. NECK:  Trachea is midline. No apparent mass. CARDIAC:  Regular rate and rhythm. No murmurs, rubs, or gallops  auscultated. LUNGS:  Clear to auscultation bilaterally. Good rise and fall of the  chest.  ABDOMEN:  Soft and nontender. MUSCULOSKELETAL:  No obvious joint deformities. SKIN:  No erythema or ecchymosis noted. NEUROLOGIC:  Alert and oriented x4. PSYCHIATRIC:  Normal mood and affect. IMAGING STUDIES:  Chest x-ray was done that showed findings consistent  with diffuse bilateral mid basal pneumoniae and/or congestive heart  failure with chronic underlying lung disease. No significant pleural  effusions detected. Echo has been ordered. We will followup on this. Last echo was a [facility] that was done on 05/31/2022 and it showed severe  aortic stenosis with moderate mitral stenosis and moderate MR. Last  heart catheterization was done on 05/31/2022 and showed left main  patent. LAD mild disease. Circ mild disease. RCA ostium and calcium  noted, but no dampening or pressure noted. Just mild disease. Aortic  valve was not crossed, as it was noted be severe. Right heart pressures  were normal.    LABORATORY DATA:  BMP within normal limits, including creatinine at 0.6. Troponin is mildly elevated at 0.029. Her BNP is elevated at 9357. LFTs overall within normal limits, but her AST is mildly elevated at 41. CBC showed white count within normal limits. Hemoglobin slightly low at  9.1. Platelet count is stable at 262,000. She was negative for COVID. IMPRESSION AND PLAN:  This is an 56-year-old female who comes in with  heart failure type symptoms and worsening shortness of breath, elevated  BNP, and pulmonary edema noted on the chest x-ray. At this time, we  will place her on Lasix. her blood pressure was quite high initially,  but it is coming down in the 170 or 190 range now. As it is filling  down, I believe we can use the oral medication. She did take  medications this morning already, she says. So, I will place her on  amlodipine, as it did not lower heart rate. We need to be careful with  her heart rate, as she appear to be a little bit bradycardic in the 50  range. She is on metoprolol at home and reportedly she took this this  morning. We will follow up on echocardiogram.  Continue diuresis and  lower blood pressures for now. Further treatment dictated by hospital  course.     Agree with above  Hx of AS-s/p TAVR   HTN   Bradycardia       NEHEMIAH Su    D: 02/06/2023 11:58:20       T: 02/06/2023 14:36:21     [email or web address]  Job#: [id number]     Doc#: [id number]    CC:  Jerald Gregg MD

## 2023-02-06 NOTE — ED PROVIDER NOTES
12 lead EKG per my interpretation:  Normal Sinus Rhythm at 64 with premature supraventricular complexes  Axis is   Left axis deviation  QTc is  within an acceptable range  There is no specific T wave changes appreciated. There is no specific ST wave changes appreciated. LBBB    Prior EKG to compare with was available and left bundle branch block morphology was seen on prior.     MD Audrey Mitchell MD  02/06/23 6057

## 2023-02-06 NOTE — ED PROVIDER NOTES
EMERGENCY DEPARTMENT ENCOUNTER        Pt Name: Simin Bustos  MRN: [id number]  [place] 1940  Date of evaluation: 2/6/2023  Provider: NEHEMIAH Wiley  PCP: Narciso Moe     I have seen and evaluated this patient with my supervising physician Dr Al Cool       Chief Complaint   Patient presents with    Shortness of Breath     Pt denies any hx of COPD. Non smoker. Pt O2 sat in the 80's on room air. Pt 89-90%% on 2 Lpm O2 . No respiratory distress noted. Denies cough. HISTORY OF PRESENT ILLNESS      Chief Complaint: Shortness of breath    Simin Bustos is a [de-identified] y.o. female who presents to the Emergency Department today with worsening/progressive shortness of breath over the last week. Patient has felt generally fatigued and weak and short of breath especially on exertion. On arrival, oxygen in the high/mid 80s. Placed on 2 L and did well, has no obvious distress. Describing no chest pain. Patient has history of pretty severe aortic stenosis, has had history of a TAVR back in July, is on Eliquis. Follows with Dr. Alvaro Boland. No new medications. No cough congestion fevers chills. No other significant sick contacts. Nursing Notes were all reviewed and agreed with or any disagreements were addressed in the HPI. REVIEW OF SYSTEMS     At least 10 systems reviewed and otherwise acutely negative except as in the [address]      PAST MEDICAL HISTORY     Past Medical History:   Diagnosis Date    Aortic stenosis     Arthritis     Hypertension        SURGICAL HISTORY     Past Surgical History:   Procedure Laterality Date    CARPAL TUNNEL RELEASE      COSMETIC SURGERY         CURRENTMEDICATIONS       Previous Medications    ACETAMINOPHEN (TYLENOL) 500 MG TABLET    Take 1,000 mg by mouth every morning    ASCORBIC ACID (VITAMIN C PO)    Take 1 tablet by mouth daily    ATORVASTATIN (LIPITOR) 20 MG TABLET    Take 20 mg by mouth daily     ELIQUIS 2.5 MG TABS TABLET Take 2.5 mg by mouth 2 times daily    FUROSEMIDE (LASIX) 20 MG TABLET    Take 1 tablet by mouth daily    LEVOTHYROXINE (SYNTHROID) 100 MCG TABLET    Take 1 tablet by mouth daily    LOSARTAN (COZAAR) 100 MG TABLET    Take 100 mg by mouth daily    METOPROLOL TARTRATE (LOPRESSOR) 50 MG TABLET    Take 1 tablet by mouth 2 times daily    MULTIPLE VITAMIN (MULTIVITAMIN ADULT PO)    Take 1 tablet by mouth daily       ALLERGIES     Ceclor [cefaclor]    FAMILYHISTORY     History reviewed. No pertinent family history. SOCIAL HISTORY       Social History     Socioeconomic History    Marital status:      Spouse name: Tatianna Corrales     Number of children: 5    Years of education: None    Highest education level: None   Tobacco Use    Smoking status: Never    Smokeless tobacco: Current   Substance and Sexual Activity    Alcohol use: No    Drug use: No       SCREENINGS    Dora Coma Scale  Eye Opening: Spontaneous  Best Verbal Response: Oriented  Best Motor Response: Obeys commands  Tillson Coma Scale Score: 15      PHYSICAL EXAM       ED Triage Vitals [02/06/23 1013]   BP Temp Temp Source Heart Rate Resp SpO2 Height Weight   (!) 213/98 98.8 °F (37.1 °C) Oral (!) 38 24 (!) 89 % 5' 5\" (1.651 m) 93 lb (42.2 kg)      Constitutional:  Well developed, Well nourished. No distress  HENT:  Normocephalic, Atraumatic, PERRL. EOMI. Sclera clear. Conjunctiva normal, No discharge. Oropharynx is within normal limits, no tonsillar hypertrophy white exudate, tolerating secretions. Bilateral TMs are pearly white without signs of significant erythema or effusion. No perforation. No mastoid tenderness. Neck/Lymphatics:  supple, no JVD, no swollen nodes  Cardiovascular:   RRR,  no murmurs/rubs/gallops. Respiratory: On 2 L per nasal cannula, no obvious labored breathing. Lung sounds diminished, no significant rales rhonchi wheezing noted. Abdomen: Bowel sounds normal, Soft, No tenderness, no masses.   :  No significant flank tenderness to percussion. Musculoskeletal:    There is no edema, asymmetry, or calf / thigh tenderness bilaterally. No cyanosis. No cool or pale-appearing limb. Distal cap refill and pulses intact bilateral upper and lower extremities  Bilateral upper and lower extremity ROM intact without pain or obvious deficit  Integument:   Warm, Dry  Neurologic:  Alert & oriented , No focal deficits noted. Cranial nerves II through XII grossly intact. Normal gross motor coordination & motor strength bilateral upper and lower extremities  Sensation intact. Psychiatric:  Affect normal, Mood normal.     DIAGNOSTIC RESULTS   LABS:    Labs Reviewed   CBC WITH AUTO DIFFERENTIAL - Abnormal; Notable for the following components:       Result Value    RBC 3.38 (*)     Hemoglobin 9.1 (*)     Hematocrit 29.7 (*)     MCH 26.9 (*)     MCHC 30.6 (*)     RDW 17.0 (*)     Segs Relative 80.4 (*)     Lymphocytes % 9.0 (*)     Monocytes % 8.5 (*)     All other components within normal limits   COMPREHENSIVE METABOLIC PANEL - Abnormal; Notable for the following components:    Glucose 134 (*)     AST 41 (*)     All other components within normal limits   TROPONIN - Abnormal; Notable for the following components:    Troponin T 0.029 (*)     All other components within normal limits   BRAIN NATRIURETIC PEPTIDE - Abnormal; Notable for the following components:    Pro-BNP 9,357 (*)     All other components within normal limits   BLOOD GAS, VENOUS - Abnormal; Notable for the following components:    pCO2, Kike 53 (*)     pO2, Kike 61 (*)     Base Exc, Mixed 4.9 (*)     HCO3, Venous 31.4 (*)     O2 Sat, Kike 87.4 (*)     All other components within normal limits   PROTIME/INR & PTT - Abnormal; Notable for the following components:    Protime 16.3 (*)     All other components within normal limits   COVID-19, RAPID   MAGNESIUM   TSH   T4, FREE   LACTIC ACID       When ordered, only abnormal lab results are displayed.  All other labs were within normal range or not returned as of this dictation. EKG: When ordered, EKG's are interpreted by the Emergency Department Physician in the absence of a cardiologist.  Please see their note for interpretation of EKG. RADIOLOGY:   Non-plain film images such as CT, Ultrasound and MRI are read by the radiologist. Plain radiographic images are visualized and preliminarily interpreted by the  ED Provider with the below findings:    Interpretation perthe Radiologist below, if available at the time of this note:    XR CHEST PORTABLE   Final Result   Findings consistent with diffuse bilateral mid-basilar pneumonia and/or   congestive heart failure accentuated by underlying chronic lung disease   without detectable significant pleural effusion. XR CHEST PORTABLE    Result Date: 2/6/2023  EXAMINATION: ONE XRAY VIEW OF THE CHEST 2/6/2023 10:37 am COMPARISON: 06/27/2022 HISTORY: ORDERING SYSTEM PROVIDED HISTORY: sob TECHNOLOGIST PROVIDED HISTORY: Reason for exam:->sob Reason for Exam: sob FINDINGS: Infiltrative changes mid-lower lung fields bilaterally worst in the hilar and perihilar regions. Heart size is enlarged. Findings are consistent with diffuse bilateral mid-basilar pneumonia and/or congestive heart failure accentuated by underlying chronic lung disease. No large areas of pulmonary consolidation, detectable pleural effusion or pneumothorax. Findings consistent with diffuse bilateral mid-basilar pneumonia and/or congestive heart failure accentuated by underlying chronic lung disease without detectable significant pleural effusion. PROCEDURES   Unless otherwise noted below, none      CRITICAL CARE   CRITICAL CARE NOTE:  CRITICAL CARE NOTE:  There was a high probability of clinically significant life-threatening deterioration of the patient's condition requiring my urgent intervention due to CHF exacerbation.   New oxygen requirement, IV Lasix, specialty consultation, patient counseling was performed to address this. Total critical care time is  45 minutes. This includes vital sign monitoring, pulse oximetry monitoring, telemetry monitoring, clinical response to the IV medications, reviewing the nursing notes, consultation time, dictation/documentation time, and interpretation of the lab work. This time excludes time spent performing procedures and separately billable procedures and family discussion time. N/A    CONSULTS:  IP CONSULT TO CARDIOLOGY  IP CONSULT TO HEART FAILURE NURSE/COORDINATOR  IP CONSULT TO DIETITIAN      EMERGENCY DEPARTMENT COURSE and MDM:   Vitals:    Vitals:    02/06/23 1041 02/06/23 1151 02/06/23 1233 02/06/23 1315   BP: (!) 202/154 (!) 179/83  (!) 185/86   Pulse: 57 55 54 57   Resp: 19 24 15 14   Temp:    98.8 °F (37.1 °C)   TempSrc:       SpO2: 96%   94%   Weight:       Height:           Patient was given thefollowing medications:  Medications   furosemide (LASIX) injection 40 mg (has no administration in time range)   amLODIPine (NORVASC) tablet 5 mg (has no administration in time range)   hydrALAZINE (APRESOLINE) tablet 50 mg (has no administration in time range)   losartan (COZAAR) tablet 50 mg (has no administration in time range)   atorvastatin (LIPITOR) tablet 20 mg (has no administration in time range)   aspirin tablet 325 mg (325 mg Oral Given 2/6/23 1043)         Is this patient to be included in the SEP-1 Core Measure due to severe sepsis or septic shock? No   Exclusion criteria - the patient is NOT to be included for SEP-1 Core Measure due to: Alternative explanation for abnormal labs/vitals that do not relate to sepsis, see MDM for further explanation    MDM:    CC/HPI Summary, DDx, ED Course, and Reassessment:     Patient presents as above. Emergent etiologies considered. Patient seen and examined. Work-up initiated secondary to presentation, physical exam findings, vital signs and medical chart review.     In brief, 73-year-old female presenting to the emergency department today with progressive shortness of breath, high blood pressure has been progressing over the last week. Patient has history of TAVR, seen for severe aortic stenosis. Denies any chest pain. No infectious-like symptoms. No obvious signs of peripheral edema, was placed on 2 L per nasal cannula on arrival but has no other distress. Describing no chest pain. Work-up is showing signs of underlying heart failure, blood pressure was elevated. No signs of significant ischemic changes. Did have an elevated BNP, detectable troponin, vascular congestion on x-ray. Cardiology was consulted came down and saw the patient will initiate Lasix, will provide patient's hydralazine/Norvasc, losartan for blood pressure control. Admitted to medicine. A stat echo was also ordered but is pending at the time of admission. Patient was seen and evaluated by attending physician. History from : Patient, Family  , and EMS    Limitations to history : None    Patient was given the following medications:  Medications   furosemide (LASIX) injection 40 mg (has no administration in time range)   amLODIPine (NORVASC) tablet 5 mg (has no administration in time range)   hydrALAZINE (APRESOLINE) tablet 50 mg (has no administration in time range)   losartan (COZAAR) tablet 50 mg (has no administration in time range)   atorvastatin (LIPITOR) tablet 20 mg (has no administration in time range)   aspirin tablet 325 mg (325 mg Oral Given 2/6/23 1043)     Chronic conditions affecting care: Aortic stenosis, CHF    Discussion with Other Profesionals : Admitting Team   and Consultant cardiology    Social Determinants : None    Records Reviewed : Inpatient Notes   and Outpatient Notes      I am the Primary Clinician of Record. CLINICAL IMPRESSION      1. Dyspnea and respiratory abnormalities    2.  Congestive heart failure, unspecified HF chronicity, unspecified heart failure type ([facility] [address]          DISPOSITION/PLAN DISPOSITION Admitted 02/06/2023 12:06:14 PM      PATIENT REFERREDTO:  No follow-up provider specified. DISCHARGE MEDICATIONS:  New Prescriptions    No medications on file       DISCONTINUED MEDICATIONS:  Discontinued Medications    ASPIRIN 81 MG CHEWABLE TABLET    Take 1 tablet by mouth daily    DIPHENHYDRAMINE-APAP, SLEEP, (TYLENOL PM EXTRA STRENGTH PO)    Take 2 tablets by mouth nightly    MELOXICAM (MOBIC) 15 MG TABLET    Take 15 mg by mouth daily              (Please note that portions ofthis note were completed with a voice recognition program.  Efforts were made to edit the dictations but occasionally words are mis-transcribed. )    NEHEMIAH Adamson (electronically signed)            NEHEMIAH Alcantar  02/06/23 5657

## 2023-02-06 NOTE — PROGRESS NOTES
4 Eyes Skin Assessment      NAME:    DATE OF BIRTH:    MEDICAL RECORD NUMBER:       The patient is being assessed for  Admission     I agree that One RN have performed a thorough Head to Toe Skin Assessment on the patient. ALL assessment sites listed below have been assessed. Areas assessed by both nurses:     Head, Face, Ears, Shoulders, Back, Chest, Arms, Elbows, Hands, Sacrum. Buttock, Coccyx, Ischium, and Legs. Feet and Heels                              Does the Patient have a Wound?  No noted wound(s)       Neno Prevention initiated by RN: NA   Wound Care Orders initiated by RN: NA     Pressure Injury (Stage 3,4, Unstageable, DTI, NWPT, and Complex wounds) if present place referral order by RN under : NA     New and Established Ostomies, if present place, referral order under : NA       Nurse 1 eSignature: Electronically signed by Divya Patel RN on 2/6/23 at 6:01 PM EST     **SHARE this note so that the co-signing nurse is able to place an eSignature**     Nurse 2 eSignature: Electronically signed by Dorys Powell RN on 2/6/23 at 6:32 PM EST

## 2023-02-06 NOTE — ED TRIAGE NOTES
Pt presents to ED from home for SOB that has gotten worse over the last several days.  Pt is on room air with RR unlabored

## 2023-02-06 NOTE — CARE COORDINATION
CM - [place] [place] (left sided ) reviewed at this time, criteria supports the INPATIENT admission as presented .

## 2023-02-06 NOTE — CONSULTS
Dictated-[id number]  Came in with worsening SOB over a few days- SOB with rest yesterday provoking ED visit  BNP elevated and appears to have pulmonary edema on CXR  BP very high initially- slowly coming down now- would give PO meds to lower as it is coming down and Trop only mildly elevated-will start amlodipine  Hx of Severe AS s/p TAVR at [facility] in July, she did have mod-severe MR on f/u echo after and was to see [facility] again for f/u over this- she is not sure if she had repeat echo for this or not  Echo ordered for today, use Lasix for diuresis for now and add amlodipine for BP-watch HR as she is [place] in the 50's  Thanks for consult    She has been short of breath for 48 hrs   She has been out of her lasix for  48 hrs  She has no chest pain  She has hx of bradycardia she is on Toprol at home  Her BP is elevated this am  Elevated BNP   Elevated trop no ACS--  Ekg shows sinus rate in 50's      Hold betablockers  Adjust BP meds-  Check echo   Hydralazine 50mg tid, Cozaar 50 mg bid and Norvasc 5 mg       Cath 5/22  LEFT MAIN PATENT  LAD MILD DX  LCX MILD DX  RCA MILD DX  CALCIUM NOTED ON RCA OSTIUM NO DAMPENING OF PRESSURE  AV NOT CROSSED  RA-2/1/0  RV-24/1/2  PA-22/6/12  PCWP-7/6/5  MILD CAD  WILL CHECK RUPERTO FOR MV  MAC NOTED     Ruperto ---IMPRESSION:--5/22  1. No clot or thrombus noted in the left atrium or left atrial  appendage. 2.  Left atrium is moderately enlarged. 3.  Moderate mitral regurgitation noted. 4.  Mitral annular calcification noted. 5.  Posterior mitral leaflet is calcified and restricted. 6.  Anterior leaflet is moving. 7.  Severe aortic stenosis is noted. 8.  Ascending aorta is dilated also. LV function was preserved       PAST MEDICAL HISTORY:  Severe aortic stenosis noted, hypertension,  hyperlipidemia, dyspnea, heart failure, hypothyroidism present,  arthritis present, severe aortic stenosis.      PAST SURGICAL HISTORY:  History of carpal tunnel surgery      Electronically signed by Harshad Fink INR 1.9 in clinic. INR goal range 2.5-3.5. Pt denies bleeding or bruising. Pt denies changes to medications or diet. Pt reports she missed her warfarin dose last night. Pt's last INR 3 weeks ago was 3.5 when TWD was decreased to 32.5 mg. Discussed taking a one-time increased dose of 5 mg tonight then continue same TWD of 32.5 mg taking 2.5 mg on Wed and 5 mg all other days of the week. Recheck INR in 4 weeks. See AAC flowsheet. Onsite billing provider is Dr. Betsy Bullock. Pt ambulated to clinic without assistive device.  SJ Darren Loredo MD on 2/6/23 at 12:17 PM EST

## 2023-02-07 ENCOUNTER — APPOINTMENT (OUTPATIENT)
Dept: CT IMAGING | Age: 83
End: 2023-02-07
Payer: MEDICARE

## 2023-02-07 ENCOUNTER — ANESTHESIA EVENT (OUTPATIENT)
Dept: ENDOSCOPY | Age: 83
End: 2023-02-07
Payer: MEDICARE

## 2023-02-07 LAB
ANION GAP SERPL CALCULATED.3IONS-SCNC: 14 MMOL/L (ref 4–16)
BASOPHILS ABSOLUTE: 0 K/CU MM
BASOPHILS RELATIVE PERCENT: 0.5 % (ref 0–1)
BUN SERPL-MCNC: 16 MG/DL (ref 6–23)
CALCIUM SERPL-MCNC: 9 MG/DL (ref 8.3–10.6)
CHLORIDE BLD-SCNC: 99 MMOL/L (ref 99–110)
CHOLEST SERPL-MCNC: 158 MG/DL
CO2: 28 MMOL/L (ref 21–32)
CREAT SERPL-MCNC: 0.7 MG/DL (ref 0.6–1.1)
DIFFERENTIAL TYPE: ABNORMAL
EOSINOPHILS ABSOLUTE: 0.1 K/CU MM
EOSINOPHILS RELATIVE PERCENT: 1 % (ref 0–3)
GFR SERPL CREATININE-BSD FRML MDRD: >60 ML/MIN/1.73M2
GLUCOSE SERPL-MCNC: 95 MG/DL (ref 70–99)
HCT VFR BLD CALC: 37 % (ref 37–47)
HDLC SERPL-MCNC: 55 MG/DL
HEMOGLOBIN: 11.4 GM/DL (ref 12.5–16)
IMMATURE NEUTROPHIL %: 0.4 % (ref 0–0.43)
LDLC SERPL CALC-MCNC: 81 MG/DL
LYMPHOCYTES ABSOLUTE: 1 K/CU MM
LYMPHOCYTES RELATIVE PERCENT: 12.1 % (ref 24–44)
MAGNESIUM: 2.2 MG/DL (ref 1.8–2.4)
MCH RBC QN AUTO: 26.5 PG (ref 27–31)
MCHC RBC AUTO-ENTMCNC: 30.8 % (ref 32–36)
MCV RBC AUTO: 86 FL (ref 78–100)
MONOCYTES ABSOLUTE: 0.9 K/CU MM
MONOCYTES RELATIVE PERCENT: 11.1 % (ref 0–4)
NUCLEATED RBC %: 0 %
PDW BLD-RTO: 17 % (ref 11.7–14.9)
PLATELET # BLD: 354 K/CU MM (ref 140–440)
PMV BLD AUTO: 10.4 FL (ref 7.5–11.1)
POTASSIUM SERPL-SCNC: 3.4 MMOL/L (ref 3.5–5.1)
PRO-BNP: 9981 PG/ML
RBC # BLD: 4.3 M/CU MM (ref 4.2–5.4)
SEGMENTED NEUTROPHILS ABSOLUTE COUNT: 6.1 K/CU MM
SEGMENTED NEUTROPHILS RELATIVE PERCENT: 74.9 % (ref 36–66)
SODIUM BLD-SCNC: 141 MMOL/L (ref 135–145)
TOTAL IMMATURE NEUTOROPHIL: 0.03 K/CU MM
TOTAL NUCLEATED RBC: 0 K/CU MM
TRIGL SERPL-MCNC: 110 MG/DL
TROPONIN T: 0.04 NG/ML
TROPONIN T: 0.04 NG/ML
WBC # BLD: 8.2 K/CU MM (ref 4–10.5)

## 2023-02-07 PROCEDURE — 2580000003 HC RX 258: Performed by: NURSE PRACTITIONER

## 2023-02-07 PROCEDURE — C9113 INJ PANTOPRAZOLE SODIUM, VIA: HCPCS | Performed by: INTERNAL MEDICINE

## 2023-02-07 PROCEDURE — 2580000003 HC RX 258: Performed by: PHYSICIAN ASSISTANT

## 2023-02-07 PROCEDURE — 6370000000 HC RX 637 (ALT 250 FOR IP): Performed by: NURSE PRACTITIONER

## 2023-02-07 PROCEDURE — 80048 BASIC METABOLIC PNL TOTAL CA: CPT

## 2023-02-07 PROCEDURE — 83880 ASSAY OF NATRIURETIC PEPTIDE: CPT

## 2023-02-07 PROCEDURE — 71260 CT THORAX DX C+: CPT | Performed by: INTERNAL MEDICINE

## 2023-02-07 PROCEDURE — 94761 N-INVAS EAR/PLS OXIMETRY MLT: CPT

## 2023-02-07 PROCEDURE — 85018 HEMOGLOBIN: CPT

## 2023-02-07 PROCEDURE — 85014 HEMATOCRIT: CPT

## 2023-02-07 PROCEDURE — 6360000002 HC RX W HCPCS: Performed by: PHYSICIAN ASSISTANT

## 2023-02-07 PROCEDURE — 85025 COMPLETE CBC W/AUTO DIFF WBC: CPT

## 2023-02-07 PROCEDURE — 36415 COLL VENOUS BLD VENIPUNCTURE: CPT

## 2023-02-07 PROCEDURE — 6370000000 HC RX 637 (ALT 250 FOR IP): Performed by: INTERNAL MEDICINE

## 2023-02-07 PROCEDURE — 93005 ELECTROCARDIOGRAM TRACING: CPT | Performed by: STUDENT IN AN ORGANIZED HEALTH CARE EDUCATION/TRAINING PROGRAM

## 2023-02-07 PROCEDURE — 6360000002 HC RX W HCPCS: Performed by: INTERNAL MEDICINE

## 2023-02-07 PROCEDURE — 84484 ASSAY OF TROPONIN QUANT: CPT

## 2023-02-07 PROCEDURE — 1200000000 HC SEMI PRIVATE

## 2023-02-07 PROCEDURE — 6370000000 HC RX 637 (ALT 250 FOR IP): Performed by: PHYSICIAN ASSISTANT

## 2023-02-07 PROCEDURE — 6360000004 HC RX CONTRAST MEDICATION: Performed by: INTERNAL MEDICINE

## 2023-02-07 PROCEDURE — 6360000002 HC RX W HCPCS: Performed by: NURSE PRACTITIONER

## 2023-02-07 PROCEDURE — 83735 ASSAY OF MAGNESIUM: CPT

## 2023-02-07 PROCEDURE — 80061 LIPID PANEL: CPT

## 2023-02-07 PROCEDURE — 99222 1ST HOSP IP/OBS MODERATE 55: CPT | Performed by: INTERNAL MEDICINE

## 2023-02-07 RX ORDER — POTASSIUM CHLORIDE 20 MEQ/1
40 TABLET, EXTENDED RELEASE ORAL ONCE
Status: COMPLETED | OUTPATIENT
Start: 2023-02-07 | End: 2023-02-07

## 2023-02-07 RX ORDER — FUROSEMIDE 10 MG/ML
40 INJECTION INTRAMUSCULAR; INTRAVENOUS DAILY
Status: DISCONTINUED | OUTPATIENT
Start: 2023-02-07 | End: 2023-02-08

## 2023-02-07 RX ORDER — PANTOPRAZOLE SODIUM 40 MG/10ML
40 INJECTION, POWDER, LYOPHILIZED, FOR SOLUTION INTRAVENOUS 2 TIMES DAILY
Status: DISCONTINUED | OUTPATIENT
Start: 2023-02-07 | End: 2023-02-10 | Stop reason: HOSPADM

## 2023-02-07 RX ORDER — POTASSIUM CHLORIDE 20 MEQ/1
10 TABLET, EXTENDED RELEASE ORAL 2 TIMES DAILY
Status: DISCONTINUED | OUTPATIENT
Start: 2023-02-07 | End: 2023-02-10 | Stop reason: HOSPADM

## 2023-02-07 RX ADMIN — ACETAMINOPHEN 650 MG: 325 TABLET ORAL at 02:47

## 2023-02-07 RX ADMIN — HYDRALAZINE HYDROCHLORIDE 50 MG: 50 TABLET, FILM COATED ORAL at 21:10

## 2023-02-07 RX ADMIN — POTASSIUM CHLORIDE 10 MEQ: 1500 TABLET, EXTENDED RELEASE ORAL at 21:10

## 2023-02-07 RX ADMIN — AMLODIPINE BESYLATE 5 MG: 5 TABLET ORAL at 21:10

## 2023-02-07 RX ADMIN — HYDRALAZINE HYDROCHLORIDE 50 MG: 50 TABLET, FILM COATED ORAL at 05:44

## 2023-02-07 RX ADMIN — IOPAMIDOL 75 ML: 755 INJECTION, SOLUTION INTRAVENOUS at 09:19

## 2023-02-07 RX ADMIN — AMIODARONE HYDROCHLORIDE 150 MG: 50 INJECTION, SOLUTION INTRAVENOUS at 11:15

## 2023-02-07 RX ADMIN — AMIODARONE HYDROCHLORIDE 1 MG/MIN: 50 INJECTION, SOLUTION INTRAVENOUS at 12:05

## 2023-02-07 RX ADMIN — METOPROLOL TARTRATE 25 MG: 25 TABLET, FILM COATED ORAL at 12:06

## 2023-02-07 RX ADMIN — APIXABAN 2.5 MG: 2.5 TABLET, FILM COATED ORAL at 08:55

## 2023-02-07 RX ADMIN — FUROSEMIDE 40 MG: 10 INJECTION, SOLUTION INTRAMUSCULAR; INTRAVENOUS at 10:02

## 2023-02-07 RX ADMIN — POTASSIUM CHLORIDE 40 MEQ: 1500 TABLET, EXTENDED RELEASE ORAL at 06:52

## 2023-02-07 RX ADMIN — IRON SUCROSE 200 MG: 20 INJECTION, SOLUTION INTRAVENOUS at 16:13

## 2023-02-07 RX ADMIN — LEVOTHYROXINE SODIUM 100 MCG: 100 TABLET ORAL at 05:44

## 2023-02-07 RX ADMIN — AMLODIPINE BESYLATE 5 MG: 5 TABLET ORAL at 08:55

## 2023-02-07 RX ADMIN — LOSARTAN POTASSIUM 50 MG: 25 TABLET, FILM COATED ORAL at 21:10

## 2023-02-07 RX ADMIN — ATORVASTATIN CALCIUM 20 MG: 10 TABLET, FILM COATED ORAL at 21:11

## 2023-02-07 RX ADMIN — ACETAMINOPHEN 1000 MG: 500 TABLET ORAL at 08:54

## 2023-02-07 RX ADMIN — AMIODARONE HYDROCHLORIDE 0.5 MG/MIN: 50 INJECTION, SOLUTION INTRAVENOUS at 16:44

## 2023-02-07 RX ADMIN — PANTOPRAZOLE SODIUM 40 MG: 40 INJECTION, POWDER, FOR SOLUTION INTRAVENOUS at 21:10

## 2023-02-07 RX ADMIN — METOPROLOL TARTRATE 25 MG: 25 TABLET, FILM COATED ORAL at 21:11

## 2023-02-07 RX ADMIN — SODIUM CHLORIDE, PRESERVATIVE FREE 10 ML: 5 INJECTION INTRAVENOUS at 10:04

## 2023-02-07 RX ADMIN — PANTOPRAZOLE SODIUM 40 MG: 40 INJECTION, POWDER, FOR SOLUTION INTRAVENOUS at 11:19

## 2023-02-07 RX ADMIN — POTASSIUM CHLORIDE 10 MEQ: 1500 TABLET, EXTENDED RELEASE ORAL at 08:55

## 2023-02-07 RX ADMIN — LOSARTAN POTASSIUM 50 MG: 25 TABLET, FILM COATED ORAL at 08:54

## 2023-02-07 ASSESSMENT — PAIN DESCRIPTION - DESCRIPTORS: DESCRIPTORS: ACHING

## 2023-02-07 ASSESSMENT — PAIN DESCRIPTION - ORIENTATION: ORIENTATION: POSTERIOR

## 2023-02-07 ASSESSMENT — PAIN DESCRIPTION - LOCATION: LOCATION: HEAD

## 2023-02-07 ASSESSMENT — PAIN - FUNCTIONAL ASSESSMENT: PAIN_FUNCTIONAL_ASSESSMENT: ACTIVITIES ARE NOT PREVENTED

## 2023-02-07 ASSESSMENT — PAIN SCALES - GENERAL
PAINLEVEL_OUTOF10: 0
PAINLEVEL_OUTOF10: 4
PAINLEVEL_OUTOF10: 6

## 2023-02-07 NOTE — CONSULTS
[address] [facility] G[facility]atology             MD Cliff Hess MD             Damir Kathleen, APRN-CNP             [address] [facility], [address] [facility]             [phone] fax [phone]      Gastroenterology Consult Note  Cliff Sánchez MD      Reason for Consult:  abnormal ct chest may have bleeding    Primary Care Physician:  Deandre Arauz    History Obtained From:  patient, electronic medical record    CC: Shortness of breath    HISTORY OF PRESENT ILLNESS:              The patient is a [de-identified] y.o.  female with past medical history of severe aortic stenosis status post TAVR in July 2022, nonrheumatic mitral valve stenosis, chronic diastolic heart failure, hypertension, hyperlipidemia, hypothyroidism who presented to the hospital with complaining of shortness of breath that had been worsening for the past 2 days associated with sore throat and some congestion. On presentation she was noted to be hypoxic, BNP was significantly elevated to 9357 and chest x-ray revealed findings consistent with bilateral congestion. She was started on diuretics and a CTA was performed. CT revealed short segment circumferential mucosal thickening of the mid thoracic esophagus at the level of the aortic and pulmonary window concerning for esophagitis however there was also findings suspicious for active GI bleeding near the GE junction. She was also noted to be anemic and thus GI was consulted. Past Medical History:        Diagnosis Date    Aortic stenosis     Arthritis     Hypertension        Past Surgical History:        Procedure Laterality Date    CARPAL TUNNEL RELEASE      COSMETIC SURGERY         Medications Prior to Admission:    Prior to Admission medications    Medication Sig Start Date End Date Taking?  Authorizing Provider   acetaminophen (TYLENOL) 500 MG tablet Take 1,000 mg by mouth every morning   Yes Historical Provider, MD   losartan (COZAAR) 100 MG tablet Take 100 mg by mouth daily 2/5/23   Historical Provider, MD   ELIQUIS 2.5 MG TABS tablet Take 2.5 mg by mouth 2 times daily 2/5/23   Historical Provider, MD   Ascorbic Acid (VITAMIN C PO) Take 1 tablet by mouth daily    Historical Provider, MD   Multiple Vitamin (MULTIVITAMIN ADULT PO) Take 1 tablet by mouth daily    Historical Provider, MD   metoprolol tartrate (LOPRESSOR) 50 MG tablet Take 1 tablet by mouth 2 times daily 5/31/22   Sera Urias PA-C   furosemide (LASIX) 20 MG tablet Take 1 tablet by mouth daily 5/30/22   Laila Dickinson MD   levothyroxine (SYNTHROID) 100 MCG tablet Take 1 tablet by mouth daily 5/30/22   Laila Dickinson MD   atorvastatin (LIPITOR) 20 MG tablet Take 20 mg by mouth daily     Historical Provider, MD       Allergies: Allergies   Allergen Reactions    Ceclor [Cefaclor] Hives   . Social History:    Social History     Socioeconomic History    Marital status:      Spouse name: Nadege Blackburn     Number of children: 5    Years of education: Not on file    Highest education level: Not on file   Occupational History    Not on file   Tobacco Use    Smoking status: Never    Smokeless tobacco: Current   Substance and Sexual Activity    Alcohol use: No    Drug use: No    Sexual activity: Not on file   Other Topics Concern    Not on file   Social History Narrative    Not on file     Social Determinants of Health     Financial Resource Strain: Not on file   Food Insecurity: Not on file   Transportation Needs: Not on file   Physical Activity: Not on file   Stress: Not on file   Social Connections: Not on file   Intimate Partner Violence: Not on file   Housing Stability: Not on file        Family History: reviewed and positives included in HPI- all other pertinent GI family history negative      REVIEW OF SYSTEMS: see HPI for positives and pertinent negatives.  All other systems reviewed and are negative    PHYSICAL EXAM:    Vitals:  /82   Pulse 86   Temp 98.1 °F (36.7 °C) (Oral)   Resp 21   Ht 5' 5\" (1.651 m)   Wt 87 lb 12.8 oz (39.8 kg)   SpO2 95%   BMI 14.61 kg/m²   CONSTITUTIONAL: alert, cooperative, no apparent distress,   EYES:  pupils equal, round and reactive to light and sclera clear  ENT:  normocepalic, without obvious abnormality  NECK:  supple, symmetrical, trachea midline  HEMATOLOGIC/LYMPHATICS:  no cervical lymphadenopathy and no supraclavicular lymphadenopathy  LUNGS:  clear to auscultation  CARDIOVASCULAR:  regular rate and rhythm and no murmur noted  ABDOMEN:  Soft, non-tender with normal bowel sounds. No organomegaly or masses  NEUROLOGIC: no focal deficit detected  SKIN:  no lesions  EXTREMITIES: no clubbing, cyanosis, or edema    IMPRESSION:  1) abnormal CT chest: Noted to have pooling of contrast concerning for active bleed at the GE junction associated with anemia. Patient also noted to have circumferential wall thickening concerning for esophagitis. She denies any dysphagia at the moment. Possible etiologies include severe esophagitis versus neoplasm  2) normocytic anemia  3) severe aortic stenosis status post TAVR on Eliquis currently held  4) CHF  5) hypertension    RECOMMENDATIONS:  1) NPO after midnight. 2) Plan for Upper Endoscopy tomorrow. 3) Continue to hold Eliquis. 4) we will discuss with cardiology for possible procedure tomorrow  5) IV PPI        Tigre Venegas MD    Thank you for allowing us to be involved in the management of this patient. We will follow this patient along with you. Please feel free to call with any questions.

## 2023-02-07 NOTE — CONSULTS
Internal Medicine Consult      Reason for admission: Acute decompensated diastolic congestive heart failrue    Reason for consult: to take over the care    History Obtained From:  patient, electronic medical record    HISTORY OF PRESENT ILLNESS:    The patient is a [de-identified] y.o. female who has history of multiple valvular problem was brought in the hospital due to acute shortness of breath. Patient had TVAR done in 2022 for her aortic stenosis. She has history of high blood pressure and her medications were being adjusted as a outpatient. However, she started getting severely short of breath. Her oxygen was low when she came to hosptial and required nasal oxygen. Patient was seen by cardiologist as a outpatient as well as inpatient. Patient was found to have CHF, Pulmonary edema. Stat echo showed normal ef but had multiple valvular heart issues. She had one run of wide complex tachycardia for which amiodarone was started. Patient siad that her breathing is better. She does not have fever, no cough or wheezing. No nausea, vomiting at this time    She had CTA of chest today, which showed possible pulling of blood at GE junction, for which she was seen by GI and scheduled for EGD tomorrow.      Past Medical History:        Diagnosis Date    Aortic stenosis     Arthritis     Hypertension        Past Surgical History:        Procedure Laterality Date    CARPAL TUNNEL RELEASE      COSMETIC SURGERY         Medications Prior to Admission:    Medications Prior to Admission: acetaminophen (TYLENOL) 500 MG tablet, Take 1,000 mg by mouth every morning  losartan (COZAAR) 100 MG tablet, Take 100 mg by mouth daily  ELIQUIS 2.5 MG TABS tablet, Take 2.5 mg by mouth 2 times daily  Ascorbic Acid (VITAMIN C PO), Take 1 tablet by mouth daily  Multiple Vitamin (MULTIVITAMIN ADULT PO), Take 1 tablet by mouth daily  metoprolol tartrate (LOPRESSOR) 50 MG tablet, Take 1 tablet by mouth 2 times daily  furosemide (LASIX) 20 MG tablet, Take 1 tablet by mouth daily  levothyroxine (SYNTHROID) 100 MCG tablet, Take 1 tablet by mouth daily  atorvastatin (LIPITOR) 20 MG tablet, Take 20 mg by mouth daily     Allergies:  Ceclor [cefaclor]    Social History:   TOBACCO:   reports that she has never smoked. She uses smokeless tobacco.  ETOH:   reports no history of alcohol use. Patient currently lives with family ()    Family History:   History reviewed. No pertinent family history. REVIEW OF SYSTEMS:  CONSTITUTIONAL:  Neg   Recent weight changes,fatigue,fever,chills or night sweats  EYES:  Neg  blurriness,tearing,itching or acute change in vision  EARS:  Neg   hearing loss,tinnitus,vertigo,discharge or earache. NOSE:  Neg  rhinorrhea,sneezing,itching,allergy or epistaxis  MOUTH/THROAT:  Neg  bleeding gums,hoarseness or sore throat. RESPIRATORY:    As per HPI  CARDIOVASCULAR   positive for orthopnea, worsening shortness of breath  GASTROINTESTINAL:  Neg   Appetite changes,nausea,vomiting,or diarrhea,indigestion,dysphagia,change in bowel movements, or abdominal pain. GENITOURINARY:  Neg  Urinary frequency,hesitancy,urgency,polyuria,dysuria,hematuria,nocturia,or incontinence. HEMATOLOGIC/LYMPHATIC:  positive for anemia  MUSCULOSKELETAL:  Neg   New myalgias,bone pain,joint pain,swelling or stiffness and has had no change in gait. NEUROLOGICAL:  Neg  Loss of Consciousness,memeory loss,forgetfulness,periods of confusion,decline in intellect,nervousness,insomina,aphasia or dysarthria. SKIN :  Neg  skin or hair changes,and has no itching,rashes,sores. PSYCHIATRIC:  Neg depression,personality changes,anxiety. ENDOCRINE:  Neg polydipsia,polyuria,abnormal weight changes,heat /cold intolerance. ALL/IMM :  Neg reactions to drugs other than listed,food,insects,skin rash,trouble breathing,local or general lymph node enlargement or tenderness.     PHYSICAL EXAM:  /69   Pulse 67   Temp 97.9 °F (36.6 °C) (Oral)   Resp 20   Ht 5' 5\" (1.651 m)   Wt 87 lb 12.8 oz (39.8 kg)   SpO2 97%   BMI 14.61 kg/m²   General appearance: alert, appears stated age, cooperative and no distress  Head: Normocephalic, without obvious abnormality, atraumatic  Eyes: conjunctivae/corneas clear. PERRL, EOM's intact. Ears: normal external  ears  Neck: no adenopathy, no carotid bruit, no JVD, supple, symmetrical, trachea midline and thyroid not enlarged, no tenderness/mass/nodules  Lungs: clear to auscultation bilaterally  Heart: regular rate and rhythm, S1, S2 normal, no murmur, regular rate and rhythm   Abdomen:soft, non-tender; non-distended normal bowel sounds no masses, no organomegaly  Extremities:Pedal edema none  Homans sign is negative, no sign of DVT  Skin: Skin color, texture, turgor normal. No rashes or lesions  Neurologic: Alert and oriented X 3, normal strength and tone. Normal symmetric reflexes. Normal coordination and gait,Babinski absent.   Mental status: Alert, oriented, thought content appropriate  Cranial nerves:II-XII Grossly intact  Sensory: normal  Motor:grossly normal    DATA:  EKG:  I have reviewed EKG with the following interpretation:  Imaging:    CBC with Differential:    Lab Results   Component Value Date/Time    WBC 8.2 02/07/2023 10:27 AM    RBC 4.30 02/07/2023 10:27 AM    HGB 11.4 02/07/2023 10:27 AM    HCT 37.0 02/07/2023 10:27 AM     02/07/2023 10:27 AM    MCV 86.0 02/07/2023 10:27 AM    SEGSPCT 74.9 02/07/2023 10:27 AM    LYMPHOPCT 12.1 02/07/2023 10:27 AM    DIFFTYPE AUTOMATED DIFFERENTIAL 02/07/2023 10:27 AM     BMP:    Lab Results   Component Value Date/Time     02/07/2023 02:02 AM    K 3.4 02/07/2023 02:02 AM    CL 99 02/07/2023 02:02 AM    CO2 28 02/07/2023 02:02 AM    BUN 16 02/07/2023 02:02 AM    CREATININE 0.7 02/07/2023 02:02 AM    CALCIUM 9.0 02/07/2023 02:02 AM    GFRAA >60 06/28/2022 12:11 AM    LABGLOM >60 02/07/2023 02:02 AM    GLUCOSE 95 02/07/2023 02:02 AM     PT/INR:  No results found for: PTINR    ASSESSMENT / PLAN:     Patient Active Problem List:     Dyspnea     Nonrheumatic aortic valve stenosis     Nonrheumatic mitral valve stenosis     Severe aortic valve stenosis     Acute on chronic diastolic heart failure (HCC)     Acute diastolic CHF (congestive heart failure), NYHA class 4 ([facility])     Acute on chronic diastolic HF (heart failure) ([facility])     Wide complex tachycardia     Abnormal GE junction on CT scan    Plan  Amiodarone IV  EGD tomorrow  MOnitor labs: including hemoglobin, electrolytes and kidney functions  Continue diuretics. Discussed with family and patient in detail.     See orders  Disposition:   Jaun Quiroga MD

## 2023-02-07 NOTE — PROGRESS NOTES
Daily Progress Note  Subjective:    Pt. Awake, alert and feeling ok  HR stable, NSR in the 90 range, BP stable  No CP, Sob much improved today    Attending Note:    CTA of chest today  R/o PE  She is feeling better  She sees joan NP-with nola SAINZ   She had a run of possible aberrant rhythm wide complex  Will load with amiodarone for now  Ct chest shows possible upper GI bleed per primary team  Placed on protonix for now--H/H is stable   S/p TAVR for severe AS she has moderate MR=will treat medically for now she was not a surgical candidate due to her comorbid medical issues   She has normal EF noted on echo with pulmonary HTN--will check old echo report to check on pulmonary HTN-CTA negative for Pe  HTN adjusted meds   Last cath in 5/22-mild dx noted  Holding betablockers due to bradycardia, will try low dose due to arrhythmia present  She may need a pacer --watch for now   Correct K     Supportive care for now     Impression and Plan:     Acute on Chronic HFpEF    EF preserved on echo    S/p TAVR- appears stable on echo    Mod-sev MR noted on echo as well-stable from previous    BNP elevated- on lasix and feeling much better now- on RA on exam    WCT    Noted this am around 8:30    Irregular and no axis switch noted so believe it is more PAF with aberrant conduction    Regardless discussed with Dr. Dexter Mckoy who rec. Aio drip for now    No Ac for now until confirmed as concerned for GI bleed now as well    CTA chest done this am to r/o PE and noted concern for GI bleed at GE junction with contrast-passed along to Primary team-ordered STAT H&H  Will follow    Most Recent Echo  2/6/23  Summary   Left ventricular systolic function is normal.   Ejection fraction is visually estimated at 55%. Grade II diastolic dysfunction. S/P normal functioning TAVR valve; Mean Gradient: 7mmHg. No evidence of paravalvular leakage. Severe mitral annular calcification. Moderate to severe mitral regurgitation.    Severe tricuspid regurgitation; RVSP: 60.13mmHg; Severe PHTN. No evidence of any pericardial effusion. Inferior vena cava is dilated, measuring at 2.5cm, but does collapse 50%   with respiration. Most Recent Heart Cath  5/31/22  IMPRESSION:  1. Left main is patent. 2.  LAD has mild disease noted. 3.  Circ has mild disease noted. 4.  RCA ostium calcium noted. No dampening of pressure noted. Just  mild disease noted. 5.  Aortic valve is not crossed. 6.  There is a significant mitral annular calcification present. 7.  Right heart pressures are normal.  8.  The patient has nonobstructive coronary artery disease present. 9.  Significant mitral annular calcification noted. [id number] Score for Atrial Fibrillation Stroke Risk   Risk   Factors  Component Value   C CHF Yes 1   H HTN No 0   A2 Age >= 76 Yes,  [de-identified] y.o.) 2   D DM No 0   S2 Prior Stroke/TIA No 0   V Vascular Disease No 0   A Age 74-69 No,  [de-identified] y.o.) 0   Sc Sex female 1    [id number]  Score  4   Score last updated 2/7/23 90:88 AM EST    Click here for a link to the UpToDate guideline \"Atrial Fibrillation: Anticoagulation therapy to prevent embolization    Disclaimer: Risk Score calculation is dependent on accuracy of patient problem list and past encounter diagnosis. Radiology      Impression   No evidence of pulmonary embolism. Foci of subsegmental consolidation lung bases, diffuse mild ground-glass   infiltrative changes mid-basilar regions, small bilateral pleural effusions. Correlation with clinical evidence of bibasilar atelectasis or pneumonia   suggested. Unremarkable CT appearance aortic valve prosthesis, fusiform aneurysmal   ectasia of ascending aorta to maximal diameter of 4 cm noted and unchanged   compared to 05/28/2022. Heavy atherosclerotic calcification mitral valve   noted.        Short-segment circumferential mucosal thickening midthoracic esophagus at the   level of the aortic or pulmonary window unchanged compared to 05/28/2022   suggesting benign process such as esophagitis. Small hiatal hernia with   intense contrast accumulation near the gastroesophageal junction. Active GI   bleeding near the gastroesophageal junction should be considered. Endoscopic   evaluation would be helpful for further evaluation. PAST MEDICAL HISTORY:  Severe aortic stenosis, moderate-to-severe mitral  regurgitation, hypertension, hyperlipidemia, diastolic heart failure,  and arthritis. PAST SURGICAL HISTORY:  Carpal tunnel syndrome. She had a TAVR done in  07/2022 at [facility].     SOCIAL HISTORY:  She does not smoke. She does not drink. ALLERGIES:  [place]. HOME MEDICATIONS:  She was on losartan 100 mg daily. , Eliquis 2.5 mg  b.i.d., Lopressor 50 mg b.i.d., Lasix 20 mg daily, Synthroid, and  Lipitor 20 mg daily at bedtime.   Objective:   /82   Pulse 86   Temp 98.1 °F (36.7 °C) (Oral)   Resp 21   Ht 5' 5\" (1.651 m)   Wt 87 lb 12.8 oz (39.8 kg)   SpO2 95%   BMI 14.61 kg/m²     Intake/Output Summary (Last 24 hours) at 2/7/2023 1014  Last data filed at 2/7/2023 0843  Gross per 24 hour   Intake --   Output 400 ml   Net -400 ml       Medications:   Scheduled Meds:   potassium chloride  10 mEq Oral BID    [Held by provider] furosemide  40 mg IntraVENous Daily    amiodarone  150 mg IntraVENous Once    hydrALAZINE  50 mg Oral 3 times per day    losartan  50 mg Oral BID    atorvastatin  20 mg Oral Nightly    acetaminophen  1,000 mg Oral QAM    [Held by provider] apixaban  2.5 mg Oral BID    levothyroxine  100 mcg Oral Daily    sodium chloride flush  5-40 mL IntraVENous 2 times per day    iron sucrose  200 mg IntraVENous Q24H    amLODIPine  5 mg Oral BID      Infusions:   amiodarone      Followed by    amiodarone      sodium chloride        PRN Meds:  sodium chloride flush, sodium chloride, ondansetron **OR** ondansetron, polyethylene glycol, acetaminophen **OR** acetaminophen, potassium chloride **OR** potassium alternative oral replacement **OR** potassium chloride, magnesium sulfate, nitroGLYCERIN     Physical Exam:  Vitals:    02/07/23 1000   BP: 136/82   Pulse: 86   Resp: 21   Temp:    SpO2: 95%        General: AAO, NAD  Chest: Nontender  Cardiac: First and Second Heart Sounds are Normal, No Murmurs or Gallops noted  Lungs:Clear to auscultation and percussion. Abdomen: Soft, NT, ND, +BS  Extremities: No clubbing, no edema  Vascular:  Equal 2+ peripheral pulses. Lab Data:  CBC:   Recent Labs     02/06/23  1037   WBC 8.1   HGB 9.1*   HCT 29.7*   MCV 87.9        BMP:   Recent Labs     02/06/23  1037 02/07/23  0202    141   K 4.2 3.4*    99   CO2 28 28   BUN 17 16   CREATININE 0.6 0.7     LIVER PROFILE:   Recent Labs     02/06/23  1037   AST 41*   ALT 27   BILITOT 0.5   ALKPHOS 80     PT/INR:   Recent Labs     02/06/23  1037   PROTIME 16.3*   INR 1.26     APTT:   Recent Labs     02/06/23  1037   APTT 36.2     BNP:  No results for input(s): BNP in the last 72 hours.       Assessment:  Patient Active Problem List    Diagnosis Date Noted    Acute on chronic diastolic HF (heart failure) ([facility] 02/06/2023    Acute on chronic diastolic heart failure ([address] 64/56/4558    Acute diastolic CHF (congestive heart failure), NYHA class 4 ([address] 06/27/2022    Severe aortic valve stenosis 05/31/2022    Nonrheumatic aortic valve stenosis 05/30/2022    Nonrheumatic mitral valve stenosis 05/30/2022    Dyspnea 05/28/2022       Electronically signed by Lake Muhammad PA-C on 2/7/2023 at 10:14 AM    Electronically signed by Yuan Moon MD on 2/7/23 at 11:38 AM EST

## 2023-02-07 NOTE — CARE COORDINATION
Case Management Assessment  Initial Evaluation    Date/Time of Evaluation: 2/7/2023 4:32 PM  Assessment Completed by: GERALDO Shanks    If patient is discharged prior to next notation, then this note serves as note for discharge by case management. Patient Name: Radha Lau                   YOB: 1940  Diagnosis: Dyspnea and respiratory abnormalities [R06.00, R06.89]  Acute left-sided HF (heart failure) ([address] [I50.1]  Congestive heart failure, unspecified HF chronicity, unspecified heart failure type ([address] [I50.9]                   Date / Time: 2/6/2023 10:08 AM    Patient Admission Status: Inpatient   Readmission Risk (Low < 19, Mod (19-27), High > 27): Readmission Risk Score: 13.6    Current PCP: Ronna Gonzales  PCP verified by CM? Yes    Chart Reviewed: Yes      History Provided by: Patient  Patient Orientation: Alert and Oriented    Patient Cognition: Alert    Hospitalization in the last 30 days (Readmission):  No    If yes, Readmission Assessment in CM Navigator will be completed. Advance Directives:      Code Status: Full Code   Patient's Primary Decision Maker is: Legal Next of Kin    Primary Decision Maker: Dennis Coleman - Spouse - [phone]    Discharge Planning:    Patient lives with: Spouse/Significant Other Type of Home: House  Primary Care Giver: Spouse  Patient Support Systems include: Spouse/Significant Other, Children   Current Financial resources: Medicare  Current community resources: None  Current services prior to admission: None            Current DME:              Type of Home Care services:  None    ADLS  Prior functional level: Independent in ADLs/IADLs  Current functional level: Independent in ADLs/IADLs    PT AM-PAC:   /24  OT AM-PAC:   /24    Family can provide assistance at DC: Yes  Would you like Case Management to discuss the discharge plan with any other family members/significant others, and if so, who?  Yes  Plans to Return to Present Housing: Yes  Other Identified Issues/Barriers to RETURNING to current housing: none  Potential Assistance needed at discharge: N/A            Potential DME:  none  Patient [facility] to [facility] to: [facility] for transportation at discharge:  spouse    Financial    Payor: Joseph Fisher / Plan: Maik Nye ESSENTIAL/PLUS / Product Type: *No Product type* /     Does insurance require precert for SNF: Yes    Potential assistance Purchasing Medications:    Meds-to-Beds request: Yes      [address] [facility] (), [place] - [address] [facility] [phone] - F [phone]  [address] [facility] ([address]) [address]  Phone: [phone] Fax: [phone]      Notes:    Factors facilitating achievement of predicted outcomes: Family support, Motivated, Cooperative, and Pleasant    Barriers to discharge: EGD tomorrow    Additional Case Management Notes: CM in to see Pt to initiate discharge planning. Pt spouse present. Pt denies any needs at this time. Discharge plan is home.   CM following     The Plan for Transition of Care is related to the following treatment goals of Dyspnea and respiratory abnormalities [R06.00, R06.89]  Acute left-sided HF (heart failure) (HCC) [I50.1]  Congestive heart failure, unspecified HF chronicity, unspecified heart failure type ([address] [I50.9]    The Patient and/or Patient Representative Agree with the Discharge Plan?  yes    Janay Michael [facility]  Case Management Department  Ph: [phone]

## 2023-02-07 NOTE — PLAN OF CARE
Problem: Discharge Planning  Goal: Discharge to home or other facility with appropriate resources  2/6/2023 2008 by Preston Meléndez RN  Outcome: Progressing  2/6/2023 1812 by Octavio Gamboa RN  Outcome: Progressing     Problem: Safety - Adult  Goal: Free from fall injury  Outcome: Progressing     Problem: Skin/Tissue Integrity  Goal: Absence of new skin breakdown  Description: 1. Monitor for areas of redness and/or skin breakdown  2. Assess vascular access sites hourly  3. Every 4-6 hours minimum:  Change oxygen saturation probe site  4. Every 4-6 hours:  If on nasal continuous positive airway pressure, respiratory therapy assess nares and determine need for appliance change or resting period.   Outcome: Progressing

## 2023-02-07 NOTE — CONSULTS
Comprehensive Nutrition Assessment    Type and Reason for Visit:  Initial, Patient Education, Consult (Heart Failure Nutrition Therapy for the Undernourished + low BMI)    Nutrition Recommendations/Plan:   Start high jamilah/high pro ONS TID  Assist with meals as needed   Monitor weights, po intake     Malnutrition Assessment:  Malnutrition Status: At risk for malnutrition (Comment) (NFPE as able) (02/07/23 1050)    Context:  Chronic Illness     Findings of the 6 clinical characteristics of malnutrition:  Energy Intake:  No significant decrease in energy intake (per pt and family)  Weight Loss:  Unable to assess     Body Fat Loss:  Unable to assess     Muscle Mass Loss:  Unable to assess    Fluid Accumulation:  Mild     Strength:  Not Performed    Nutrition Assessment:    Pt admitted with dyspnea, CHF, h/o HTN, HLD, prediabetes. Pt reports good appetite and po intakes here and PTA; UBW of around 93#, pt has not noted any weight changes recently. Pt does endorse eating all of her meals out and does not want to eat at home; encouraged pt to not use salt shaker, order foods with less or no seasoning, use less gravies/sauces/dressings, and order more vegetables/fruits when eating out. Pt agreeable to trial high jamilah/high protein ONS. Pt appears cachectic and thin, perform NFPE as able (pt receiving care at time of visit). Will continue to monitor at moderate nutrition risk. Nutrition Related Findings:    2/6: K 3.4, transferrin 96; non-pitting and 1+ pitting edema to BLE. Wound Type: None       Current Nutrition Intake & Therapies:    Average Meal Intake: Unable to assess     ADULT DIET; Regular; No Added Salt (3-4 gm); 1800 ml    Anthropometric Measures:  Height: 5' 5\" (165.1 cm)  Ideal Body Weight (IBW): 125 lbs (57 kg)    Admission Body Weight: 87 lb 11.9 oz (39.8 kg)  Current Body Weight: 87 lb 11.9 oz (39.8 kg),   IBW.  Weight Source: Standing Scale  Current BMI (kg/m2): 14.6  Usual Body Weight: 93 lb (42.2 kg)  % Weight Change (Calculated): -5.7  Weight Adjustment For: No Adjustment                 BMI Categories: Underweight (BMI less than 22) age over 72    Estimated Daily Nutrient Needs:  Energy Requirements Based On: Kcal/kg  Weight Used for Energy Requirements: Ideal  Energy (kcal/day): 1781-3192 (25-30kcal/kg of IBW)  Weight Used for Protein Requirements: Ideal  Protein (g/day): 75-85 (1.37g/kg of IBW)  Method Used for Fluid Requirements: 1 ml/kcal  Fluid (ml/day): 1500    Nutrition Diagnosis:   Underweight related to catabolic illness, inadequate protein-energy intake, cardiac dysfunction as evidenced by      Nutrition Interventions:   Food and/or Nutrient Delivery: Continue Current Diet, Start Oral Nutrition Supplement  Nutrition Education/Counseling: Education completed, Survival skills/brief education completed  Coordination of Nutrition Care: Continue to monitor while inpatient       Goals:     Goals: Meet at least 75% of estimated needs       Nutrition Monitoring and Evaluation:      Food/Nutrient Intake Outcomes: Food and Nutrient Intake, Supplement Intake  Physical Signs/Symptoms Outcomes: Weight, Nutrition Focused Physical Findings    Discharge Planning:    Continue Oral Nutrition Supplement     Leisa Boyer, [address]: [id number]

## 2023-02-07 NOTE — PROGRESS NOTES
Met with patient and Spouse. Introduced myself as the Heart failure education R.N. Patient reports she and spouse eat out for most of their meals. They primarily consume fast foods. Discussed alternative convenience foods, like Healthy choice meals. States her mouth is always dry and she is drinking often. Offered suggestion of Biotene spray or ice. Admitting diagnosis- Acute on chronic diastolic Heart failure. Cardiologist- RAZIA Garcia   Ejection fraction 55% as of 2/6/23 with grade II DD  Pro BNP- 9,357 on 2/6/23  Hospital follow up appt-  Rayray Orr on 2/14  Patient informed of appointment and appointment added to AVS  Smoking Cessation information and referral- N/A   Pneumonia vaccine- refused   PCP- Keyana Simms  Patient has a digital scale? Yes   Transportation- has transportation    Able to obtain medications without difficulty? - Yes- Patient denies difficulty in obtaining or taking medications. Advised not to stop taking a medication without notifying provider. Heart failure specific Medications-  Norvasc, Eliquis, Lasix, Losartan and potassium. Reviewed Heart failure patient education book with patient/ spouse. Questions answered. Patient's heart failure medications reviewed and information given on each. Patient was engaged and attentive during education session. The following handouts were reviewed with patient and patient was given a copy of the following : Heart Failure education booklet, the [employer] handout, the 'Stop Light' Handout,  link to the American [employer] Association's Healthier Living with Heart Failure Interactive workbook and a list of heart failure related education available on the hospital TV and how to access it.

## 2023-02-07 NOTE — ANESTHESIA PRE PROCEDURE
Department of Anesthesiology  Preprocedure Note       Name:  Ermelinda Pierre   Age:  [de-identified] y.o.  :  1940                                          MRN:  [id number]         Date:  2023      Surgeon: Lisandro Gomez):  Jerilyn Bailey MD    Procedure: Procedure(s):  EGD ESOPHAGOGASTRODUODENOSCOPY    Medications prior to admission:   Prior to Admission medications    Medication Sig Start Date End Date Taking?  Authorizing Provider   acetaminophen (TYLENOL) 500 MG tablet Take 1,000 mg by mouth every morning   Yes Historical Provider, MD   losartan (COZAAR) 100 MG tablet Take 100 mg by mouth daily 23   Historical Provider, MD   ELIQUIS 2.5 MG TABS tablet Take 2.5 mg by mouth 2 times daily 23   Historical Provider, MD   Ascorbic Acid (VITAMIN C PO) Take 1 tablet by mouth daily    Historical Provider, MD   Multiple Vitamin (MULTIVITAMIN ADULT PO) Take 1 tablet by mouth daily    Historical Provider, MD   metoprolol tartrate (LOPRESSOR) 50 MG tablet Take 1 tablet by mouth 2 times daily 22   Martin Cardoza PA-C   furosemide (LASIX) 20 MG tablet Take 1 tablet by mouth daily 22   Emiliano Bright MD   levothyroxine (SYNTHROID) 100 MCG tablet Take 1 tablet by mouth daily 22   Emiliano Bright MD   atorvastatin (LIPITOR) 20 MG tablet Take 20 mg by mouth daily     Historical Provider, MD       Current medications:    Current Facility-Administered Medications   Medication Dose Route Frequency Provider Last Rate Last Admin    potassium chloride (KLOR-CON M) extended release tablet 10 mEq  10 mEq Oral BID Idris Omer MD   10 mEq at 23 0855    [Held by provider] furosemide (LASIX) injection 40 mg  40 mg IntraVENous Daily Idris Omer MD   40 mg at 23 1002    amiodarone (CORDARONE) 450 mg in dextrose 5 % 250 mL infusion  1 mg/min IntraVENous Continuous Anival Dumont PA-C 33.3 mL/hr at 23 1205 1 mg/min at 23 1205    Followed by   Hazel Monique amiodarone (CORDARONE) 450 mg in dextrose 5 % 250 mL infusion  0.5 mg/min IntraVENous Continuous Madeleine Davies PA-C        pantoprazole (PROTONIX) injection 40 mg  40 mg IntraVENous BID Rayray Orr MD   40 mg at 02/07/23 1119    metoprolol tartrate (LOPRESSOR) tablet 25 mg  25 mg Oral BID Madeleine Davies PA-C   25 mg at 02/07/23 1206    hydrALAZINE (APRESOLINE) tablet 50 mg  50 mg Oral 3 times per day Rayray Orr MD   50 mg at 02/07/23 0544    losartan (COZAAR) tablet 50 mg  50 mg Oral BID Rayray rOr MD   50 mg at 02/07/23 0854    atorvastatin (LIPITOR) tablet 20 mg  20 mg Oral Nightly Rayray Orr MD   20 mg at 02/06/23 2004    acetaminophen (TYLENOL) tablet 1,000 mg  1,000 mg Oral QAM Montine Nim, APRN - CNP   1,000 mg at 02/07/23 0854    [Held by provider] apixaban (ELIQUIS) tablet 2.5 mg  2.5 mg Oral BID Montine Nim, APRN - CNP   2.5 mg at 02/07/23 0855    levothyroxine (SYNTHROID) tablet 100 mcg  100 mcg Oral Daily Montine Nim, APRN - CNP   100 mcg at 02/07/23 0544    sodium chloride flush 0.9 % injection 5-40 mL  5-40 mL IntraVENous 2 times per day Montine Nim, APRN - CNP   10 mL at 02/07/23 1004    sodium chloride flush 0.9 % injection 5-40 mL  5-40 mL IntraVENous PRN Montine Nim, APRN - CNP        0.9 % sodium chloride infusion   IntraVENous PRN Montine Nim, APRN - CNP        ondansetron (ZOFRAN-ODT) disintegrating tablet 4 mg  4 mg Oral Q8H PRN Montine Nim, APRN - CNP        Or    ondansetron (ZOFRAN) injection 4 mg  4 mg IntraVENous Q6H PRN Montine Nim, APRN - CNP        polyethylene glycol (GLYCOLAX) packet 17 g  17 g Oral Daily PRN Montine Nim, APRN - CNP        acetaminophen (TYLENOL) tablet 650 mg  650 mg Oral Q6H PRN Montine Nim, APRN - CNP   650 mg at 02/07/23 0247    Or    acetaminophen (TYLENOL) suppository 650 mg  650 mg Rectal Q6H PRN Montine Nim, APRN - CNP        potassium chloride (KLOR-CON M) extended release tablet 40 mEq  40 mEq Oral PRN Montine Nim, APRN - CNP Or    potassium bicarb-citric acid (EFFER-K) effervescent tablet 40 mEq  40 mEq Oral PRN Lorraine Heir, APRN - CNP        Or    potassium chloride 10 mEq/100 mL IVPB (Peripheral Line)  10 mEq IntraVENous PRN Lorraine Heir, APRN - CNP        magnesium sulfate 2000 mg in 50 mL IVPB premix  2,000 mg IntraVENous PRN Lorraine Heir, APRN - CNP        nitroGLYCERIN (NITROSTAT) SL tablet 0.4 mg  0.4 mg SubLINGual Q5 Min PRN Lorraine Heir, APRN - CNP        iron sucrose (VENOFER) 200 mg in sodium chloride 0.9 % 100 mL IVPB  200 mg IntraVENous Q24H Lorraine Heir, APRN - CNP   Stopped at 02/06/23 1803    amLODIPine (NORVASC) tablet 5 mg  5 mg Oral BID Sarahy Jackson MD   5 mg at 02/07/23 0855       Allergies: Allergies   Allergen Reactions    Ceclor [Cefaclor] Hives       Problem List:    Patient Active Problem List   Diagnosis Code    Dyspnea R06.00    Nonrheumatic aortic valve stenosis I35.0    Nonrheumatic mitral valve stenosis I34.2    Severe aortic valve stenosis I35.0    Acute on chronic diastolic heart failure (HCC) I50.33    Acute diastolic CHF (congestive heart failure), NYHA class 4 ([facility]) I50.31    Acute on chronic diastolic HF (heart failure) ([facility]) I50.33       Past Medical History:        Diagnosis Date    Aortic stenosis     Arthritis     Hypertension        Past Surgical History:        Procedure Laterality Date    CARPAL TUNNEL RELEASE      COSMETIC SURGERY         Social History:    Social History     Tobacco Use    Smoking status: Never    Smokeless tobacco: Current   Substance Use Topics    Alcohol use:  No                                Ready to quit: Not Answered  Counseling given: Not Answered      Vital Signs (Current):   Vitals:    02/07/23 0845 02/07/23 1000 02/07/23 1206 02/07/23 1207   BP: 136/68 136/82 114/63 114/63   Pulse: 88 86 83 83   Resp: 16 21 18 18   Temp: 36.7 °C (98.1 °F)      TempSrc: Oral      SpO2: 97% 95%     Weight:       Height: BP Readings from Last 3 Encounters:   02/07/23 114/63   06/28/22 130/75   05/31/22 112/63       NPO Status:                                                                                 BMI:   Wt Readings from Last 3 Encounters:   02/07/23 87 lb 12.8 oz (39.8 kg)   06/27/22 92 lb (41.7 kg)   05/31/22 92 lb (41.7 kg)     Body mass index is 14.61 kg/m². CBC:   Lab Results   Component Value Date/Time    WBC 8.2 02/07/2023 10:27 AM    RBC 4.30 02/07/2023 10:27 AM    HGB 11.4 02/07/2023 10:27 AM    HCT 37.0 02/07/2023 10:27 AM    MCV 86.0 02/07/2023 10:27 AM    RDW 17.0 02/07/2023 10:27 AM     02/07/2023 10:27 AM       CMP:   Lab Results   Component Value Date/Time     02/07/2023 02:02 AM    K 3.4 02/07/2023 02:02 AM    CL 99 02/07/2023 02:02 AM    CO2 28 02/07/2023 02:02 AM    BUN 16 02/07/2023 02:02 AM    CREATININE 0.7 02/07/2023 02:02 AM    GFRAA >60 06/28/2022 12:11 AM    LABGLOM >60 02/07/2023 02:02 AM    GLUCOSE 95 02/07/2023 02:02 AM    PROT 7.2 02/06/2023 10:37 AM    CALCIUM 9.0 02/07/2023 02:02 AM    BILITOT 0.5 02/06/2023 10:37 AM    ALKPHOS 80 02/06/2023 10:37 AM    AST 41 02/06/2023 10:37 AM    ALT 27 02/06/2023 10:37 AM       POC Tests: No results for input(s): POCGLU, POCNA, POCK, POCCL, POCBUN, POCHEMO, POCHCT in the last 72 hours.     Coags:   Lab Results   Component Value Date/Time    PROTIME 16.3 02/06/2023 10:37 AM    INR 1.26 02/06/2023 10:37 AM    APTT 36.2 02/06/2023 10:37 AM       HCG (If Applicable): No results found for: PREGTESTUR, PREGSERUM, HCG, HCGQUANT     ABGs:   Lab Results   Component Value Date/Time    PO2ART 82 05/31/2022 08:30 AM    RHR6ZWB 47.0 05/31/2022 08:30 AM    HIA0XSX 29.1 05/31/2022 08:30 AM        Type & Screen (If Applicable):  No results found for: LABABO, LABRH    Drug/Infectious Status (If Applicable):  No results found for: HIV, HEPCAB    COVID-19 Screening (If Applicable):   Lab Results   Component Value Date/Time COVID19 NOT DETECTED 02/06/2023 01:15 PM           Anesthesia Evaluation  Patient summary reviewed  Airway:           Dental:          Pulmonary:                             ROS comment: CXR 2/6/2023:  Impression  Findings consistent with diffuse bilateral mid-basilar pneumonia and/or  congestive heart failure accentuated by underlying chronic lung disease  without detectable significant pleural effusion. Cardiovascular:  Exercise tolerance: poor (<4 METS),   (+) hypertension:, valvular problems/murmurs: MR, GAONA:, pulmonary hypertension: severe,       NYHA Classification: IV                 : on hold for bradycardia. ROS comment:    Sinus rhythm with marked sinus arrhythmia   Left axis deviation   Left bundle branch block   Abnormal ECG   When compared with ECG of 06-FEB-2023 10:27,   premature supraventricular complexes are no longer present     Echo 2/6/2023:  Summary   Left ventricular systolic function is normal.   Ejection fraction is visually estimated at 55%. Grade II diastolic dysfunction. S/P normal functioning TAVR valve; Mean Gradient: 7mmHg. No evidence of paravalvular leakage. Severe mitral annular calcification. Moderate to severe mitral regurgitation. Severe tricuspid regurgitation; RVSP: 60.13mmHg; Severe PHTN. No evidence of any pericardial effusion. Inferior vena cava is dilated, measuring at 2.5cm, but does collapse 50%   with respiration. S/p TAVR July 2022     Neuro/Psych:   Negative Neuro/Psych ROS              GI/Hepatic/Renal:            ROS comment: GIB. Endo/Other:    (+) blood dyscrasia: anticoagulation therapy:., .                 Abdominal:             Vascular: negative vascular ROS. Other Findings:           Anesthesia Plan      MAC     ASA 4       Induction: intravenous. Alverto Soares, APRN - CRNA   2/7/2023         Pre Anesthesia Assessment complete.  Chart reviewed on 2/7/2023

## 2023-02-08 ENCOUNTER — APPOINTMENT (OUTPATIENT)
Dept: GENERAL RADIOLOGY | Age: 83
End: 2023-02-08
Payer: MEDICARE

## 2023-02-08 ENCOUNTER — ANESTHESIA (OUTPATIENT)
Dept: ENDOSCOPY | Age: 83
End: 2023-02-08
Payer: MEDICARE

## 2023-02-08 PROBLEM — R93.3 ABNORMAL CT SCAN, ESOPHAGUS: Status: ACTIVE | Noted: 2023-02-08

## 2023-02-08 PROBLEM — D64.9 NORMOCYTIC ANEMIA: Status: ACTIVE | Noted: 2023-02-08

## 2023-02-08 LAB
ANION GAP SERPL CALCULATED.3IONS-SCNC: 11 MMOL/L (ref 4–16)
BUN SERPL-MCNC: 22 MG/DL (ref 6–23)
CALCIUM SERPL-MCNC: 9.3 MG/DL (ref 8.3–10.6)
CHLORIDE BLD-SCNC: 100 MMOL/L (ref 99–110)
CO2: 29 MMOL/L (ref 21–32)
CREAT SERPL-MCNC: 0.8 MG/DL (ref 0.6–1.1)
EKG ATRIAL RATE: 64 BPM
EKG ATRIAL RATE: 82 BPM
EKG DIAGNOSIS: NORMAL
EKG DIAGNOSIS: NORMAL
EKG P AXIS: 38 DEGREES
EKG P-R INTERVAL: 146 MS
EKG P-R INTERVAL: 156 MS
EKG Q-T INTERVAL: 460 MS
EKG Q-T INTERVAL: 482 MS
EKG QRS DURATION: 142 MS
EKG QRS DURATION: 144 MS
EKG QTC CALCULATION (BAZETT): 497 MS
EKG QTC CALCULATION (BAZETT): 537 MS
EKG R AXIS: -52 DEGREES
EKG R AXIS: -58 DEGREES
EKG T AXIS: 85 DEGREES
EKG T AXIS: 93 DEGREES
EKG VENTRICULAR RATE: 64 BPM
EKG VENTRICULAR RATE: 82 BPM
GFR SERPL CREATININE-BSD FRML MDRD: >60 ML/MIN/1.73M2
GLUCOSE SERPL-MCNC: 110 MG/DL (ref 70–99)
MAGNESIUM: 2.3 MG/DL (ref 1.8–2.4)
POTASSIUM SERPL-SCNC: 4.5 MMOL/L (ref 3.5–5.1)
SODIUM BLD-SCNC: 140 MMOL/L (ref 135–145)

## 2023-02-08 PROCEDURE — 93010 ELECTROCARDIOGRAM REPORT: CPT | Performed by: INTERNAL MEDICINE

## 2023-02-08 PROCEDURE — 2580000003 HC RX 258: Performed by: NURSE PRACTITIONER

## 2023-02-08 PROCEDURE — 3700000001 HC ADD 15 MINUTES (ANESTHESIA): Performed by: INTERNAL MEDICINE

## 2023-02-08 PROCEDURE — 88305 TISSUE EXAM BY PATHOLOGIST: CPT | Performed by: PATHOLOGY

## 2023-02-08 PROCEDURE — 0DB58ZX EXCISION OF ESOPHAGUS, VIA NATURAL OR ARTIFICIAL OPENING ENDOSCOPIC, DIAGNOSTIC: ICD-10-PCS | Performed by: INTERNAL MEDICINE

## 2023-02-08 PROCEDURE — 6370000000 HC RX 637 (ALT 250 FOR IP): Performed by: INTERNAL MEDICINE

## 2023-02-08 PROCEDURE — 6360000002 HC RX W HCPCS: Performed by: INTERNAL MEDICINE

## 2023-02-08 PROCEDURE — 36415 COLL VENOUS BLD VENIPUNCTURE: CPT

## 2023-02-08 PROCEDURE — 94761 N-INVAS EAR/PLS OXIMETRY MLT: CPT

## 2023-02-08 PROCEDURE — 3700000000 HC ANESTHESIA ATTENDED CARE: Performed by: INTERNAL MEDICINE

## 2023-02-08 PROCEDURE — 80048 BASIC METABOLIC PNL TOTAL CA: CPT

## 2023-02-08 PROCEDURE — 1200000000 HC SEMI PRIVATE

## 2023-02-08 PROCEDURE — 6370000000 HC RX 637 (ALT 250 FOR IP): Performed by: NURSE PRACTITIONER

## 2023-02-08 PROCEDURE — 93005 ELECTROCARDIOGRAM TRACING: CPT | Performed by: INTERNAL MEDICINE

## 2023-02-08 PROCEDURE — 6360000002 HC RX W HCPCS: Performed by: NURSE PRACTITIONER

## 2023-02-08 PROCEDURE — 83735 ASSAY OF MAGNESIUM: CPT

## 2023-02-08 PROCEDURE — 2500000003 HC RX 250 WO HCPCS: Performed by: NURSE ANESTHETIST, CERTIFIED REGISTERED

## 2023-02-08 PROCEDURE — 43239 EGD BIOPSY SINGLE/MULTIPLE: CPT | Performed by: INTERNAL MEDICINE

## 2023-02-08 PROCEDURE — 6370000000 HC RX 637 (ALT 250 FOR IP): Performed by: PHYSICIAN ASSISTANT

## 2023-02-08 PROCEDURE — 2709999900 HC NON-CHARGEABLE SUPPLY: Performed by: INTERNAL MEDICINE

## 2023-02-08 PROCEDURE — C9113 INJ PANTOPRAZOLE SODIUM, VIA: HCPCS | Performed by: INTERNAL MEDICINE

## 2023-02-08 PROCEDURE — 3609012400 HC EGD TRANSORAL BIOPSY SINGLE/MULTIPLE: Performed by: INTERNAL MEDICINE

## 2023-02-08 PROCEDURE — 71046 X-RAY EXAM CHEST 2 VIEWS: CPT

## 2023-02-08 PROCEDURE — 6360000002 HC RX W HCPCS: Performed by: NURSE ANESTHETIST, CERTIFIED REGISTERED

## 2023-02-08 RX ORDER — AMLODIPINE BESYLATE 5 MG/1
5 TABLET ORAL DAILY
Status: DISCONTINUED | OUTPATIENT
Start: 2023-02-09 | End: 2023-02-09

## 2023-02-08 RX ORDER — FLUCONAZOLE 100 MG/1
200 TABLET ORAL ONCE
Status: COMPLETED | OUTPATIENT
Start: 2023-02-09 | End: 2023-02-09

## 2023-02-08 RX ORDER — FLUCONAZOLE 100 MG/1
100 TABLET ORAL DAILY
Status: DISCONTINUED | OUTPATIENT
Start: 2023-02-10 | End: 2023-02-10 | Stop reason: HOSPADM

## 2023-02-08 RX ORDER — HYDRALAZINE HYDROCHLORIDE 50 MG/1
50 TABLET, FILM COATED ORAL EVERY 12 HOURS SCHEDULED
Status: DISCONTINUED | OUTPATIENT
Start: 2023-02-08 | End: 2023-02-10 | Stop reason: HOSPADM

## 2023-02-08 RX ORDER — PROPOFOL 10 MG/ML
INJECTION, EMULSION INTRAVENOUS PRN
Status: DISCONTINUED | OUTPATIENT
Start: 2023-02-08 | End: 2023-02-08 | Stop reason: SDUPTHER

## 2023-02-08 RX ORDER — TORSEMIDE 20 MG/1
20 TABLET ORAL DAILY
Status: DISCONTINUED | OUTPATIENT
Start: 2023-02-08 | End: 2023-02-10 | Stop reason: HOSPADM

## 2023-02-08 RX ORDER — FLUCONAZOLE 100 MG/1
200 TABLET ORAL DAILY
Status: DISCONTINUED | OUTPATIENT
Start: 2023-02-09 | End: 2023-02-08 | Stop reason: DRUGHIGH

## 2023-02-08 RX ORDER — LIDOCAINE HYDROCHLORIDE 20 MG/ML
INJECTION, SOLUTION EPIDURAL; INFILTRATION; INTRACAUDAL; PERINEURAL PRN
Status: DISCONTINUED | OUTPATIENT
Start: 2023-02-08 | End: 2023-02-08 | Stop reason: SDUPTHER

## 2023-02-08 RX ADMIN — PANTOPRAZOLE SODIUM 40 MG: 40 INJECTION, POWDER, FOR SOLUTION INTRAVENOUS at 09:41

## 2023-02-08 RX ADMIN — IRON SUCROSE 200 MG: 20 INJECTION, SOLUTION INTRAVENOUS at 16:15

## 2023-02-08 RX ADMIN — SODIUM CHLORIDE, PRESERVATIVE FREE 10 ML: 5 INJECTION INTRAVENOUS at 09:42

## 2023-02-08 RX ADMIN — LOSARTAN POTASSIUM 50 MG: 25 TABLET, FILM COATED ORAL at 21:10

## 2023-02-08 RX ADMIN — LOSARTAN POTASSIUM 50 MG: 25 TABLET, FILM COATED ORAL at 09:49

## 2023-02-08 RX ADMIN — ATORVASTATIN CALCIUM 20 MG: 10 TABLET, FILM COATED ORAL at 21:10

## 2023-02-08 RX ADMIN — TORSEMIDE 20 MG: 20 TABLET ORAL at 14:08

## 2023-02-08 RX ADMIN — SODIUM CHLORIDE, PRESERVATIVE FREE 10 ML: 5 INJECTION INTRAVENOUS at 21:10

## 2023-02-08 RX ADMIN — POTASSIUM CHLORIDE 10 MEQ: 1500 TABLET, EXTENDED RELEASE ORAL at 09:51

## 2023-02-08 RX ADMIN — ACETAMINOPHEN 1000 MG: 500 TABLET ORAL at 09:54

## 2023-02-08 RX ADMIN — AMLODIPINE BESYLATE 5 MG: 5 TABLET ORAL at 09:49

## 2023-02-08 RX ADMIN — LIDOCAINE HYDROCHLORIDE 100 MG: 20 INJECTION, SOLUTION EPIDURAL; INFILTRATION; INTRACAUDAL; PERINEURAL at 07:58

## 2023-02-08 RX ADMIN — METOPROLOL TARTRATE 25 MG: 25 TABLET, FILM COATED ORAL at 21:10

## 2023-02-08 RX ADMIN — METOPROLOL TARTRATE 25 MG: 25 TABLET, FILM COATED ORAL at 09:49

## 2023-02-08 RX ADMIN — PROPOFOL 70 MG: 10 INJECTION, EMULSION INTRAVENOUS at 07:58

## 2023-02-08 RX ADMIN — POTASSIUM CHLORIDE 10 MEQ: 1500 TABLET, EXTENDED RELEASE ORAL at 21:10

## 2023-02-08 RX ADMIN — PANTOPRAZOLE SODIUM 40 MG: 40 INJECTION, POWDER, FOR SOLUTION INTRAVENOUS at 21:11

## 2023-02-08 RX ADMIN — HYDRALAZINE HYDROCHLORIDE 50 MG: 50 TABLET, FILM COATED ORAL at 21:10

## 2023-02-08 ASSESSMENT — PAIN SCALES - WONG BAKER: WONGBAKER_NUMERICALRESPONSE: 0

## 2023-02-08 ASSESSMENT — PAIN DESCRIPTION - DESCRIPTORS: DESCRIPTORS: ACHING

## 2023-02-08 ASSESSMENT — PAIN - FUNCTIONAL ASSESSMENT: PAIN_FUNCTIONAL_ASSESSMENT: ACTIVITIES ARE NOT PREVENTED

## 2023-02-08 ASSESSMENT — PAIN SCALES - GENERAL: PAINLEVEL_OUTOF10: 0

## 2023-02-08 ASSESSMENT — PAIN DESCRIPTION - ORIENTATION: ORIENTATION: MID

## 2023-02-08 ASSESSMENT — PAIN DESCRIPTION - LOCATION: LOCATION: ABDOMEN

## 2023-02-08 NOTE — PROGRESS NOTES
Daily Progress Note  Subjective:    Pt. Awake, alert and feeling ok  HR stable, NSR in the 90 range, BP stable  No CP, Sob much improved today    Attending Note:  No cardiac issues  over night  EGD showed esophagitis candida   Stop amiodarone for now as she is on antifungal and prolong QT   Had aberrant conduction yesterday will keep on lopressor  Hx of TAVR stable  Moderate to severe MR and TR  medical treatment  Watch for bradycardia at present stable  Echo shows normal EF   Resume diuretics  Hx of moderate CAD medical treatment  HFpEF-due to valvular heart dx    Demadex 20mg daily, hydarlazine 50mg bid, lopressor 25mg bid, cozaar 50mg bid ,. Norvasc 5mg daily     Impression and Plan:     Acute on Chronic HFpEF    EF preserved on echo    S/p TAVR- appears stable on echo    Mod-sev MR noted on echo as well-stable from previous    BNP elevated- on lasix and feeling much better now- on RA on exam     WCT    Noted 2/7/23 around 8:30    Irregular and no axis switch noted so believe it is more PAF with aberrant conduction    She was started on amio yesterday however strong interaction with diflucan which she needs to take now so will stop amio today    Instead keep on lopressor to control it and watch tele    No Ac for now as concerned for GI bleed now as well     CTA chest done to r/o PE and noted concern for GI bleed at GE junction with contrast  EGD today showed candida infection- on diflucan now per GI  Will follow     Most Recent Echo  2/6/23  Summary   Left ventricular systolic function is normal.   Ejection fraction is visually estimated at 55%. Grade II diastolic dysfunction. S/P normal functioning TAVR valve; Mean Gradient: 7mmHg. No evidence of paravalvular leakage. Severe mitral annular calcification. Moderate to severe mitral regurgitation. Severe tricuspid regurgitation; RVSP: 60.13mmHg; Severe PHTN. No evidence of any pericardial effusion.    Inferior vena cava is dilated, measuring at 2.5cm, but does collapse 50%   with respiration. Most Recent Heart Cath  5/31/22  IMPRESSION:  1. Left main is patent. 2.  LAD has mild disease noted. 3.  Circ has mild disease noted. 4.  RCA ostium calcium noted. No dampening of pressure noted. Just  mild disease noted. 5.  Aortic valve is not crossed. 6.  There is a significant mitral annular calcification present. 7.  Right heart pressures are normal.  8.  The patient has nonobstructive coronary artery disease present. 9.  Significant mitral annular calcification noted. [id number] Score for Atrial Fibrillation Stroke Risk    Risk   Factors   Component Value   C CHF Yes 1   H HTN No 0   A2 Age >= 76 Yes,  [de-identified] y.o.) 2   D DM No 0   S2 Prior Stroke/TIA No 0   V Vascular Disease No 0   A Age 74-69 No,  [de-identified] y.o.) 0   Sc Sex female 1     [id number]  Score   4   Score last updated 2/7/23 35:05 AM EST     Click here for a link to the UpToDate guideline \"Atrial Fibrillation: Anticoagulation therapy to prevent embolization     Disclaimer: Risk Score calculation is dependent on accuracy of patient problem list and past encounter diagnosis. Radiology      Impression   No evidence of pulmonary embolism. Foci of subsegmental consolidation lung bases, diffuse mild ground-glass   infiltrative changes mid-basilar regions, small bilateral pleural effusions. Correlation with clinical evidence of bibasilar atelectasis or pneumonia   suggested. Unremarkable CT appearance aortic valve prosthesis, fusiform aneurysmal   ectasia of ascending aorta to maximal diameter of 4 cm noted and unchanged   compared to 05/28/2022. Heavy atherosclerotic calcification mitral valve   noted. Short-segment circumferential mucosal thickening midthoracic esophagus at the   level of the aortic or pulmonary window unchanged compared to 05/28/2022   suggesting benign process such as esophagitis.   Small hiatal hernia with   intense contrast accumulation near the gastroesophageal junction. Active GI   bleeding near the gastroesophageal junction should be considered. Endoscopic   evaluation would be helpful for further evaluation. PAST MEDICAL HISTORY:  Severe aortic stenosis, moderate-to-severe mitral  regurgitation, hypertension, hyperlipidemia, diastolic heart failure,  and arthritis. PAST SURGICAL HISTORY:  Carpal tunnel syndrome. She had a TAVR done in  07/2022 at [facility].     SOCIAL HISTORY:  She does not smoke. She does not drink. ALLERGIES:  [employer]. HOME MEDICATIONS:  She was on losartan 100 mg daily. , Eliquis 2.5 mg  b.i.d., Lopressor 50 mg b.i.d., Lasix 20 mg daily, Synthroid, and  Lipitor 20 mg daily at bedtime.       Objective:   /72   Pulse 69   Temp 98 °F (36.7 °C) (Oral)   Resp 16   Ht 5' 5\" (1.651 m)   Wt 87 lb 14.4 oz (39.9 kg)   SpO2 95%   BMI 14.63 kg/m²     Intake/Output Summary (Last 24 hours) at 2/8/2023 1003  Last data filed at 2/7/2023 1522  Gross per 24 hour   Intake 720 ml   Output 200 ml   Net 520 ml       Medications:   Scheduled Meds:   potassium chloride  10 mEq Oral BID    [Held by provider] furosemide  40 mg IntraVENous Daily    pantoprazole  40 mg IntraVENous BID    metoprolol tartrate  25 mg Oral BID    hydrALAZINE  50 mg Oral 3 times per day    losartan  50 mg Oral BID    atorvastatin  20 mg Oral Nightly    acetaminophen  1,000 mg Oral QAM    [Held by provider] apixaban  2.5 mg Oral BID    levothyroxine  100 mcg Oral Daily    sodium chloride flush  5-40 mL IntraVENous 2 times per day    iron sucrose  200 mg IntraVENous Q24H    amLODIPine  5 mg Oral BID      Infusions:   sodium chloride        PRN Meds:  sodium chloride flush, sodium chloride, ondansetron **OR** ondansetron, polyethylene glycol, acetaminophen **OR** acetaminophen, potassium chloride **OR** potassium alternative oral replacement **OR** potassium chloride, magnesium sulfate, nitroGLYCERIN     Physical Exam:  Vitals: 02/08/23 0949   BP: 136/72   Pulse: 69   Resp:    Temp:    SpO2:         General: AAO, NAD  Chest: Nontender  Cardiac: First and Second Heart Sounds are Normal, No Murmurs or Gallops noted  Lungs:Clear to auscultation and percussion. Abdomen: Soft, NT, ND, +BS  Extremities: No clubbing, no edema  Vascular:  Equal 2+ peripheral pulses. Lab Data:  CBC:   Recent Labs     02/06/23  1037 02/07/23  1027   WBC 8.1 8.2   HGB 9.1* 11.4*   HCT 29.7* 37.0   MCV 87.9 86.0    354     BMP:   Recent Labs     02/06/23  1037 02/07/23  0202 02/08/23  0442    141 140   K 4.2 3.4* 4.5    99 100   CO2 28 28 29   BUN 17 16 22   CREATININE 0.6 0.7 0.8     LIVER PROFILE:   Recent Labs     02/06/23  1037   AST 41*   ALT 27   BILITOT 0.5   ALKPHOS 80     PT/INR:   Recent Labs     02/06/23  1037   PROTIME 16.3*   INR 1.26     APTT:   Recent Labs     02/06/23  1037   APTT 36.2     BNP:  No results for input(s): BNP in the last 72 hours.       Assessment:  Patient Active Problem List    Diagnosis Date Noted    Abnormal CT scan, esophagus 02/08/2023    Normocytic anemia 02/08/2023    Acute on chronic diastolic HF (heart failure) ([facility] [address] 02/06/2023    Acute on chronic diastolic heart failure (HCC) 33/37/7843    Acute diastolic CHF (congestive heart failure), NYHA class 4 ([address] 06/27/2022    Severe aortic valve stenosis 05/31/2022    Nonrheumatic aortic valve stenosis 05/30/2022    Nonrheumatic mitral valve stenosis 05/30/2022    Dyspnea 05/28/2022       Electronically signed by Dora Banks PA-C on 2/8/2023 at 10:03 AM    Electronically signed by Jessica Lee MD on 2/8/23 at 1:07 PM EST

## 2023-02-08 NOTE — PROGRESS NOTES
02/08/23 1007   Encounter Summary   Encounter Overview/Reason  Initial Encounter   Service Provided For: Patient   Referral/Consult From: [facility]   Support System Spouse; Children   Last Encounter  02/08/23  (patient was in good spirit durikng the visit)   Complexity of Encounter Low   Begin Time 1007   End Time  1017   Total Time Calculated 10 min   Encounter    Type Initial Screen/Assessment   Spiritual/Emotional needs   Type Spiritual Support   Grief, Loss, and Adjustments   Type Adjustment to illness; Life Adjustments   Assessment/Intervention/Outcome   Assessment Calm;Coping; Hopeful;Peaceful   Intervention Active listening;Empowerment;Nurtured Hope;Sustaining Presence/Ministry of presence   Outcome Encouraged;Engaged in conversation;Expressed feelings, needs, and concerns;Expressed Gratitude   Plan and Referrals   Plan/Referrals Continue to visit, (comment); Continue Support (comment)  (as needed)

## 2023-02-08 NOTE — ANESTHESIA POSTPROCEDURE EVALUATION
Department of Anesthesiology  Postprocedure Note    Patient: Dunia Wilson  MRN: [id number]  YOB: 1940  Date of evaluation: 2/8/2023      Procedure Summary     Date: 02/08/23 Room / Location: [address]    Anesthesia Start: 2344 Anesthesia Stop: 1811    Procedure: EGD BIOPSY Diagnosis:       Abnormal CT scan      Upper GI bleed      (Abnormal CT scan [R93.89])      (Upper GI bleed [K92.2])    Surgeons: James Russell MD Responsible Provider: Enmanuel Negron MD    Anesthesia Type: MAC ASA Status: 4          Anesthesia Type: No value filed.     Harshad Phase I:      Harshad Phase II:        Anesthesia Post Evaluation    Patient location during evaluation: bedside  Patient participation: complete - patient participated  Level of consciousness: awake and alert  Pain score: 0  Airway patency: patent  Nausea & Vomiting: no nausea and no vomiting  Complications: no  Cardiovascular status: hemodynamically stable  Respiratory status: acceptable  Hydration status: euvolemic

## 2023-02-08 NOTE — PROGRESS NOTES
INTERNAL MEDICINE PROGRESS NOTE        Carlyn Diaz   1940   Primary Care Physician:  Arlene Elliott Date: 2/6/2023     Subjective:   Patient's breathing is improved at this time. No fever, chills. No nausea, vomiting or diarrhea. No headache.        Objective:   /75   Pulse 64   Temp 97.1 °F (36.2 °C) (Axillary)   Resp 14   Ht 5' 5\" (1.651 m)   Wt 87 lb 14.4 oz (39.9 kg)   SpO2 97%   BMI 14.63 kg/m²    General appearance: alert, appears stated age, and cooperative  Head: Normocephalic, without obvious abnormality, atraumatic  Neck: no adenopathy and supple, symmetrical, trachea midline  Lungs: clear to auscultation bilaterally  Heart: regular rate and rhythm and S1, S2 normal  Abdomen: soft, non-tender; bowel sounds normal; no masses,  no organomegaly  Extremities: no clubbing, cyanosis or edema  Neurologic: Grossly normal    Data Review  Lab Results   Component Value Date     02/08/2023    K 4.5 02/08/2023     02/08/2023    CO2 29 02/08/2023    CREATININE 0.8 02/08/2023    BUN 22 02/08/2023    CALCIUM 9.3 02/08/2023     Lab Results   Component Value Date    WBC 8.2 02/07/2023    HGB 11.4 (L) 02/07/2023    HCT 37.0 02/07/2023    MCV 86.0 02/07/2023     02/07/2023     INR/Prothrombin Time      Meds:    potassium chloride  10 mEq Oral BID    [Held by provider] furosemide  40 mg IntraVENous Daily    pantoprazole  40 mg IntraVENous BID    metoprolol tartrate  25 mg Oral BID    hydrALAZINE  50 mg Oral 3 times per day    losartan  50 mg Oral BID    atorvastatin  20 mg Oral Nightly    acetaminophen  1,000 mg Oral QAM    [Held by provider] apixaban  2.5 mg Oral BID    levothyroxine  100 mcg Oral Daily    sodium chloride flush  5-40 mL IntraVENous 2 times per day    iron sucrose  200 mg IntraVENous Q24H    amLODIPine  5 mg Oral BID     PRN Meds: sodium chloride flush, sodium chloride, ondansetron **OR** ondansetron, polyethylene glycol, acetaminophen **OR** acetaminophen, potassium chloride **OR** potassium alternative oral replacement **OR** potassium chloride, magnesium sulfate, nitroGLYCERIN    Assessment/Plan:   Patient Active Hospital Problem List:  Patient Active Problem List   Diagnosis    Dyspnea    Nonrheumatic aortic valve stenosis    Nonrheumatic mitral valve stenosis    Severe aortic valve stenosis    Acute on chronic diastolic heart failure (HCC)    Acute diastolic CHF (congestive heart failure), NYHA class 4 (HCC)    Ac[facility] on chronic diastolic HF [facility]         Plan:  --continue present management  -- EGD this am  -- Discussed with family about diuretics use and other medications for blood pressure control

## 2023-02-08 NOTE — PLAN OF CARE
Problem: Discharge Planning  Goal: Discharge to home or other facility with appropriate resources  2/8/2023 1116 by Flynn Swenson RN  Outcome: Progressing     Problem: Safety - Adult  Goal: Free from fall injury  2/8/2023 1116 by Flynn Swenson RN  Outcome: Progressing     Problem: Skin/Tissue Integrity  Goal: Absence of new skin breakdown  Description: 1. Monitor for areas of redness and/or skin breakdown  2. Assess vascular access sites hourly  3. Every 4-6 hours minimum:  Change oxygen saturation probe site  4. Every 4-6 hours:  If on nasal continuous positive airway pressure, respiratory therapy assess nares and determine need for appliance change or resting period.   2/8/2023 1116 by Flynn Swenson RN  Outcome: Progressing     Problem: Nutrition Deficit:  Goal: Optimize nutritional status  2/8/2023 1116 by Flynn Swenson RN  Outcome: Progressing

## 2023-02-08 NOTE — PLAN OF CARE
Problem: Discharge Planning  Goal: Discharge to home or other facility with appropriate resources  Outcome: Progressing     Problem: Safety - Adult  Goal: Free from fall injury  Outcome: Progressing     Problem: Skin/Tissue Integrity  Goal: Absence of new skin breakdown  Description: 1. Monitor for areas of redness and/or skin breakdown  2. Assess vascular access sites hourly  3. Every 4-6 hours minimum:  Change oxygen saturation probe site  4. Every 4-6 hours:  If on nasal continuous positive airway pressure, respiratory therapy assess nares and determine need for appliance change or resting period.   Outcome: Progressing     Problem: Nutrition Deficit:  Goal: Optimize nutritional status  Outcome: Progressing     Problem: Pain  Goal: Verbalizes/displays adequate comfort level or baseline comfort level  Outcome: Progressing     Problem: Chronic Conditions and Co-morbidities  Goal: Patient's chronic conditions and co-morbidity symptoms are monitored and maintained or improved  Outcome: Progressing

## 2023-02-08 NOTE — ANESTHESIA PRE PROCEDURE
Department of Anesthesiology  Preprocedure Note       Name:  Pete Wynne   Age:  [de-identified] y.o.  :  1940                                          MRN:  [id number]         Date:  2023      Surgeon: Pooja Rogers):  Corinne Medeiros MD    Procedure: Procedure(s):  EGD ESOPHAGOGASTRODUODENOSCOPY    Medications prior to admission:   Prior to Admission medications    Medication Sig Start Date End Date Taking?  Authorizing Provider   acetaminophen (TYLENOL) 500 MG tablet Take 1,000 mg by mouth every morning   Yes Historical Provider, MD   losartan (COZAAR) 100 MG tablet Take 100 mg by mouth daily 23   Historical Provider, MD   ELIQUIS 2.5 MG TABS tablet Take 2.5 mg by mouth 2 times daily 23   Historical Provider, MD   Ascorbic Acid (VITAMIN C PO) Take 1 tablet by mouth daily    Historical Provider, MD   Multiple Vitamin (MULTIVITAMIN ADULT PO) Take 1 tablet by mouth daily    Historical Provider, MD   metoprolol tartrate (LOPRESSOR) 50 MG tablet Take 1 tablet by mouth 2 times daily 22   Olga Virgen PA-C   furosemide (LASIX) 20 MG tablet Take 1 tablet by mouth daily 22   Suresh Dorsey MD   levothyroxine (SYNTHROID) 100 MCG tablet Take 1 tablet by mouth daily 22   Suresh Dorsey MD   atorvastatin (LIPITOR) 20 MG tablet Take 20 mg by mouth daily     Historical Provider, MD       Current medications:    Current Facility-Administered Medications   Medication Dose Route Frequency Provider Last Rate Last Admin    potassium chloride (KLOR-CON M) extended release tablet 10 mEq  10 mEq Oral BID Kishan Meier MD   10 mEq at 230    [Held by provider] furosemide (LASIX) injection 40 mg  40 mg IntraVENous Daily Kishan Meier MD   40 mg at 23 1002    amiodarone (CORDARONE) 450 mg in dextrose 5 % 250 mL infusion  0.5 mg/min IntraVENous Continuous Anival Dumont PA-C 16.7 mL/hr at 23 1644 0.5 mg/min at 23 1644    pantoprazole (PROTONIX) injection 40 mg  40 mg IntraVENous BID Keegan Wilson MD   40 mg at 02/07/23 2110    metoprolol tartrate (LOPRESSOR) tablet 25 mg  25 mg Oral BID Anya Olivas PA-C   25 mg at 02/07/23 2111    hydrALAZINE (APRESOLINE) tablet 50 mg  50 mg Oral 3 times per day Keegan Wilson MD   50 mg at 02/07/23 2110    losartan (COZAAR) tablet 50 mg  50 mg Oral BID Keegan Wilson MD   50 mg at 02/07/23 2110    atorvastatin (LIPITOR) tablet 20 mg  20 mg Oral Nightly Keegan Wilson MD   20 mg at 02/07/23 2111    acetaminophen (TYLENOL) tablet 1,000 mg  1,000 mg Oral QAM Torrie Mohs, APRN - CNP   1,000 mg at 02/07/23 0854    [Held by provider] apixaban (ELIQUIS) tablet 2.5 mg  2.5 mg Oral BID Torrie Mohs, APRN - CNP   2.5 mg at 02/07/23 0855    levothyroxine (SYNTHROID) tablet 100 mcg  100 mcg Oral Daily Torrie Mohs, APRN - CNP   100 mcg at 02/07/23 0544    sodium chloride flush 0.9 % injection 5-40 mL  5-40 mL IntraVENous 2 times per day Torrie Mohs, APRN - CNP   10 mL at 02/07/23 1004    sodium chloride flush 0.9 % injection 5-40 mL  5-40 mL IntraVENous PRN Torrie Mohs, APRN - CNP        0.9 % sodium chloride infusion   IntraVENous PRN Torrie Mohs, APRN - CNP        ondansetron (ZOFRAN-ODT) disintegrating tablet 4 mg  4 mg Oral Q8H PRN Torrie Mohs, APRN - CNP        Or    ondansetron (ZOFRAN) injection 4 mg  4 mg IntraVENous Q6H PRN Torrie Mohs, APRN - AC        polyethylene glycol (GLYCOLAX) packet 17 g  17 g Oral Daily PRN Torrie Mohs, APRN - CNP        acetaminophen (TYLENOL) tablet 650 mg  650 mg Oral Q6H PRN Torrie Mohs, APRN - CNP   650 mg at 02/07/23 0247    Or    acetaminophen (TYLENOL) suppository 650 mg  650 mg Rectal Q6H PRN Torrie Mohs, APRN - CNP        potassium chloride (KLOR-CON M) extended release tablet 40 mEq  40 mEq Oral PRN Torrie Mohs, APRN - CNP        Or    potassium bicarb-citric acid (EFFER-K) effervescent tablet 40 mEq  40 mEq Oral PRN Torrie Mohs, APRN - AC Or    potassium chloride 10 mEq/100 mL IVPB (Peripheral Line)  10 mEq IntraVENous PRN Lemmie José, APRN - CNP        magnesium sulfate 2000 mg in 50 mL IVPB premix  2,000 mg IntraVENous PRN Lemmie José, APRN - CNP        nitroGLYCERIN (NITROSTAT) SL tablet 0.4 mg  0.4 mg SubLINGual Q5 Min PRN Lemmie José, APRN - CNP        iron sucrose (VENOFER) 200 mg in sodium chloride 0.9 % 100 mL IVPB  200 mg IntraVENous Q24H Lemmie José, APRN - CNP   Stopped at 02/07/23 1658    amLODIPine (NORVASC) tablet 5 mg  5 mg Oral BID Asim Maynard MD   5 mg at 02/07/23 2110       Allergies: Allergies   Allergen Reactions    Ceclor [Cefaclor] Hives       Problem List:    Patient Active Problem List   Diagnosis Code    Dyspnea R06.00    Nonrheumatic aortic valve stenosis I35.0    Nonrheumatic mitral valve stenosis I34.2    Severe aortic valve stenosis I35.0    Acute on chronic diastolic heart failure ([facility]) I50.33    Acute diastolic CHF (congestive heart failure), NYHA class 4 ([facility]) I50.31    Acute on chronic diastolic HF (heart failure) ([facility]) I50.33       Past Medical History:        Diagnosis Date    Aortic stenosis     Arthritis     Hypertension        Past Surgical History:        Procedure Laterality Date    CARPAL TUNNEL RELEASE      COSMETIC SURGERY         Social History:    Social History     Tobacco Use    Smoking status: Never    Smokeless tobacco: Current   Substance Use Topics    Alcohol use:  No                                Ready to quit: Not Answered  Counseling given: Not Answered      Vital Signs (Current):   Vitals:    02/08/23 0300 02/08/23 0400 02/08/23 0500 02/08/23 0600   BP: (!) 141/60 (!) 145/69 137/76 138/75   Pulse: 60 67 60 64   Resp: 18 12 17 14   Temp:       TempSrc:       SpO2:       Weight:       Height:                                                  BP Readings from Last 3 Encounters:   02/08/23 138/75   06/28/22 130/75   05/31/22 112/63       NPO Status: Time of last liquid consumption: 0000                        Time of last solid consumption: 0000                        Date of last liquid consumption: 02/07/23                        Date of last solid food consumption: 02/07/23    BMI:   Wt Readings from Last 3 Encounters:   02/08/23 87 lb 14.4 oz (39.9 kg)   06/27/22 92 lb (41.7 kg)   05/31/22 92 lb (41.7 kg)     Body mass index is 14.63 kg/m². CBC:   Lab Results   Component Value Date/Time    WBC 8.2 02/07/2023 10:27 AM    RBC 4.30 02/07/2023 10:27 AM    HGB 11.4 02/07/2023 10:27 AM    HCT 37.0 02/07/2023 10:27 AM    MCV 86.0 02/07/2023 10:27 AM    RDW 17.0 02/07/2023 10:27 AM     02/07/2023 10:27 AM       CMP:   Lab Results   Component Value Date/Time     02/08/2023 04:42 AM    K 4.5 02/08/2023 04:42 AM     02/08/2023 04:42 AM    CO2 29 02/08/2023 04:42 AM    BUN 22 02/08/2023 04:42 AM    CREATININE 0.8 02/08/2023 04:42 AM    GFRAA >60 06/28/2022 12:11 AM    LABGLOM >60 02/08/2023 04:42 AM    GLUCOSE 110 02/08/2023 04:42 AM    PROT 7.2 02/06/2023 10:37 AM    CALCIUM 9.3 02/08/2023 04:42 AM    BILITOT 0.5 02/06/2023 10:37 AM    ALKPHOS 80 02/06/2023 10:37 AM    AST 41 02/06/2023 10:37 AM    ALT 27 02/06/2023 10:37 AM       POC Tests: No results for input(s): POCGLU, POCNA, POCK, POCCL, POCBUN, POCHEMO, POCHCT in the last 72 hours.     Coags:   Lab Results   Component Value Date/Time    PROTIME 16.3 02/06/2023 10:37 AM    INR 1.26 02/06/2023 10:37 AM    APTT 36.2 02/06/2023 10:37 AM       HCG (If Applicable): No results found for: PREGTESTUR, PREGSERUM, HCG, HCGQUANT     ABGs:   Lab Results   Component Value Date/Time    PO2ART 82 05/31/2022 08:30 AM    XXR5USZ 47.0 05/31/2022 08:30 AM    YYT1UHE 29.1 05/31/2022 08:30 AM        Type & Screen (If Applicable):  No results found for: LABABO, LABRH    Drug/Infectious Status (If Applicable):  No results found for: HIV, HEPCAB    COVID-19 Screening (If Applicable):   Lab Results   Component Value Date/Time    COVID19 NOT DETECTED 02/06/2023 01:15 PM           Anesthesia Evaluation  Patient summary reviewed  Airway: Mallampati: II  TM distance: >3 FB   Neck ROM: full  Mouth opening: > = 3 FB   Dental:    (+) lower dentures and upper dentures      Pulmonary:normal exam                              ROS comment: CXR 2/6/2023:  Impression  Findings consistent with diffuse bilateral mid-basilar pneumonia and/or  congestive heart failure accentuated by underlying chronic lung disease  without detectable significant pleural effusion. Cardiovascular:  Exercise tolerance: poor (<4 METS),   (+) hypertension:, valvular problems/murmurs: MR, GAONA:, pulmonary hypertension: severe,       NYHA Classification: IV                 : on hold for bradycardia. ROS comment:    Sinus rhythm with marked sinus arrhythmia   Left axis deviation   Left bundle branch block   Abnormal ECG   When compared with ECG of 06-FEB-2023 10:27,   premature supraventricular complexes are no longer present     Echo 2/6/2023:  Summary   Left ventricular systolic function is normal.   Ejection fraction is visually estimated at 55%. Grade II diastolic dysfunction. S/P normal functioning TAVR valve; Mean Gradient: 7mmHg. No evidence of paravalvular leakage. Severe mitral annular calcification. Moderate to severe mitral regurgitation. Severe tricuspid regurgitation; RVSP: 60.13mmHg; Severe PHTN. No evidence of any pericardial effusion. Inferior vena cava is dilated, measuring at 2.5cm, but does collapse 50%   with respiration. S/p TAVR July 2022     Neuro/Psych:   Negative Neuro/Psych ROS              GI/Hepatic/Renal:            ROS comment: GIB. Endo/Other:    (+) blood dyscrasia: anticoagulation therapy, arthritis:., .                 Abdominal:             Vascular: negative vascular ROS. Other Findings:           Anesthesia Plan      MAC     ASA 4       Induction: intravenous.       Anesthetic plan and risks discussed with patient. SUHAIL Jeffery - EDWIN   2/8/2023         Pre Anesthesia Assessment complete.  Chart reviewed on 2/8/2023

## 2023-02-08 NOTE — PROGRESS NOTES
RECEIVED FROM [facility] 3030 IN HER INPT BED, WITH [facility] AND [facility] TRANSPORT. HE STATES SHE HAS BEEN STABLE WITH NO FURTHER V TACH NOTED ON MONITOR SINCE YESTERDAY AM.NO ACTIVE BLEEDING.

## 2023-02-08 NOTE — BRIEF OP NOTE
Brief Postoperative Note      Patient: George Mack  YOB: 1940  MRN: [id number]    Date of Procedure: 2/8/2023    Pre-Op Diagnosis: Abnormal CT scan [R93.89]  Upper GI bleed [K92.2]    Post-Op Diagnosis: Same       Procedure(s):  EGD BIOPSY    Surgeon(s):  Migue Rios MD    Assistant:  * No surgical staff found *    Anesthesia: Monitor Anesthesia Care    Estimated Blood Loss (mL): Minimal    Complications: None    Specimens:   ID Type Source Tests Collected by Time Destination   A : BX Tissue Esophagus SURGICAL PATHOLOGY Migue Rios MD 2/8/2023 3576        Implants:  * No implants in log *      Drains:   [REMOVED] External Urinary Catheter (Removed)   Output (mL) 400 mL 02/07/23 0843       Impression:          -  Esophageal plaques were found, consistent with candidiasis. Biopsied.          -  Normal gastroesophageal junction. -  2 cm hiatal hernia. -  Normal stomach. -  Normal examined duodenum. Recommendation:          -  Await pathology results. -  Return patient to [facility] for [facility] care. -  Diflucan (fluconazole) 400 mg oral dose once and then 200 mg PO daily for 2             weeks. This will be after discussion with cardiology as the patient is on             Amiodarone loading dose and will need monitoring of her QTc interval.          -  Patient has a contact number available for emergencies. The signs and             symptoms of potential delayed complications were discussed with the patient. Return to normal activities tomorrow. Written discharge instructions were             provided to the patient.      Electronically signed by Migue Rios MD on 2/8/2023 at 8:20 AM

## 2023-02-09 PROBLEM — E43 SEVERE MALNUTRITION (HCC): Status: ACTIVE | Noted: 2023-02-09

## 2023-02-09 LAB
ALBUMIN SERPL-MCNC: 4.1 GM/DL (ref 3.4–5)
ALP BLD-CCNC: 76 IU/L (ref 40–128)
ALT SERPL-CCNC: 16 U/L (ref 10–40)
ANION GAP SERPL CALCULATED.3IONS-SCNC: 11 MMOL/L (ref 4–16)
AST SERPL-CCNC: 19 IU/L (ref 15–37)
BILIRUB SERPL-MCNC: 0.3 MG/DL (ref 0–1)
BUN SERPL-MCNC: 24 MG/DL (ref 6–23)
CALCIUM SERPL-MCNC: 9.3 MG/DL (ref 8.3–10.6)
CHLORIDE BLD-SCNC: 98 MMOL/L (ref 99–110)
CO2: 30 MMOL/L (ref 21–32)
CREAT SERPL-MCNC: 1.1 MG/DL (ref 0.6–1.1)
EKG ATRIAL RATE: 65 BPM
EKG DIAGNOSIS: NORMAL
EKG P AXIS: 49 DEGREES
EKG P-R INTERVAL: 164 MS
EKG Q-T INTERVAL: 518 MS
EKG QRS DURATION: 148 MS
EKG QTC CALCULATION (BAZETT): 538 MS
EKG R AXIS: -57 DEGREES
EKG T AXIS: 92 DEGREES
EKG VENTRICULAR RATE: 65 BPM
GFR SERPL CREATININE-BSD FRML MDRD: 51 ML/MIN/1.73M2
GLUCOSE SERPL-MCNC: 101 MG/DL (ref 70–99)
MAGNESIUM: 2.3 MG/DL (ref 1.8–2.4)
POTASSIUM SERPL-SCNC: 4.3 MMOL/L (ref 3.5–5.1)
PRO-BNP: 1028 PG/ML
SODIUM BLD-SCNC: 139 MMOL/L (ref 135–145)
TOTAL PROTEIN: 6.8 GM/DL (ref 6.4–8.2)

## 2023-02-09 PROCEDURE — 94664 DEMO&/EVAL PT USE INHALER: CPT

## 2023-02-09 PROCEDURE — 6360000002 HC RX W HCPCS: Performed by: INTERNAL MEDICINE

## 2023-02-09 PROCEDURE — 93010 ELECTROCARDIOGRAM REPORT: CPT | Performed by: INTERNAL MEDICINE

## 2023-02-09 PROCEDURE — 6370000000 HC RX 637 (ALT 250 FOR IP): Performed by: INTERNAL MEDICINE

## 2023-02-09 PROCEDURE — 97535 SELF CARE MNGMENT TRAINING: CPT

## 2023-02-09 PROCEDURE — 6360000002 HC RX W HCPCS: Performed by: NURSE PRACTITIONER

## 2023-02-09 PROCEDURE — 6370000000 HC RX 637 (ALT 250 FOR IP): Performed by: PHYSICIAN ASSISTANT

## 2023-02-09 PROCEDURE — 99232 SBSQ HOSP IP/OBS MODERATE 35: CPT | Performed by: INTERNAL MEDICINE

## 2023-02-09 PROCEDURE — 97162 PT EVAL MOD COMPLEX 30 MIN: CPT

## 2023-02-09 PROCEDURE — 83880 ASSAY OF NATRIURETIC PEPTIDE: CPT

## 2023-02-09 PROCEDURE — 36415 COLL VENOUS BLD VENIPUNCTURE: CPT

## 2023-02-09 PROCEDURE — 94150 VITAL CAPACITY TEST: CPT

## 2023-02-09 PROCEDURE — 2580000003 HC RX 258: Performed by: NURSE PRACTITIONER

## 2023-02-09 PROCEDURE — 1200000000 HC SEMI PRIVATE

## 2023-02-09 PROCEDURE — C9113 INJ PANTOPRAZOLE SODIUM, VIA: HCPCS | Performed by: INTERNAL MEDICINE

## 2023-02-09 PROCEDURE — 97166 OT EVAL MOD COMPLEX 45 MIN: CPT

## 2023-02-09 PROCEDURE — 6370000000 HC RX 637 (ALT 250 FOR IP): Performed by: NURSE PRACTITIONER

## 2023-02-09 PROCEDURE — 80053 COMPREHEN METABOLIC PANEL: CPT

## 2023-02-09 PROCEDURE — 97116 GAIT TRAINING THERAPY: CPT

## 2023-02-09 PROCEDURE — 94761 N-INVAS EAR/PLS OXIMETRY MLT: CPT

## 2023-02-09 PROCEDURE — 83735 ASSAY OF MAGNESIUM: CPT

## 2023-02-09 RX ORDER — LOSARTAN POTASSIUM 25 MG/1
50 TABLET ORAL DAILY
Status: DISCONTINUED | OUTPATIENT
Start: 2023-02-09 | End: 2023-02-10 | Stop reason: HOSPADM

## 2023-02-09 RX ADMIN — METOPROLOL TARTRATE 25 MG: 25 TABLET, FILM COATED ORAL at 21:57

## 2023-02-09 RX ADMIN — LOSARTAN POTASSIUM 50 MG: 25 TABLET, FILM COATED ORAL at 08:38

## 2023-02-09 RX ADMIN — METOPROLOL TARTRATE 25 MG: 25 TABLET, FILM COATED ORAL at 08:38

## 2023-02-09 RX ADMIN — APIXABAN 2.5 MG: 2.5 TABLET, FILM COATED ORAL at 21:57

## 2023-02-09 RX ADMIN — IRON SUCROSE 200 MG: 20 INJECTION, SOLUTION INTRAVENOUS at 14:58

## 2023-02-09 RX ADMIN — LEVOTHYROXINE SODIUM 100 MCG: 100 TABLET ORAL at 05:35

## 2023-02-09 RX ADMIN — PANTOPRAZOLE SODIUM 40 MG: 40 INJECTION, POWDER, FOR SOLUTION INTRAVENOUS at 08:40

## 2023-02-09 RX ADMIN — SODIUM CHLORIDE 25 ML: 9 INJECTION, SOLUTION INTRAVENOUS at 14:57

## 2023-02-09 RX ADMIN — SODIUM CHLORIDE, PRESERVATIVE FREE 10 ML: 5 INJECTION INTRAVENOUS at 08:40

## 2023-02-09 RX ADMIN — POTASSIUM CHLORIDE 10 MEQ: 1500 TABLET, EXTENDED RELEASE ORAL at 21:57

## 2023-02-09 RX ADMIN — PANTOPRAZOLE SODIUM 40 MG: 40 INJECTION, POWDER, FOR SOLUTION INTRAVENOUS at 21:57

## 2023-02-09 RX ADMIN — FLUCONAZOLE 200 MG: 100 TABLET ORAL at 08:37

## 2023-02-09 RX ADMIN — POTASSIUM CHLORIDE 10 MEQ: 1500 TABLET, EXTENDED RELEASE ORAL at 08:38

## 2023-02-09 RX ADMIN — HYDRALAZINE HYDROCHLORIDE 50 MG: 50 TABLET, FILM COATED ORAL at 08:38

## 2023-02-09 RX ADMIN — SODIUM CHLORIDE, PRESERVATIVE FREE 10 ML: 5 INJECTION INTRAVENOUS at 21:57

## 2023-02-09 RX ADMIN — ACETAMINOPHEN 1000 MG: 500 TABLET ORAL at 08:37

## 2023-02-09 RX ADMIN — ATORVASTATIN CALCIUM 20 MG: 10 TABLET, FILM COATED ORAL at 21:56

## 2023-02-09 RX ADMIN — TORSEMIDE 20 MG: 20 TABLET ORAL at 08:38

## 2023-02-09 RX ADMIN — APIXABAN 2.5 MG: 2.5 TABLET, FILM COATED ORAL at 08:38

## 2023-02-09 ASSESSMENT — PAIN SCALES - WONG BAKER: WONGBAKER_NUMERICALRESPONSE: 0

## 2023-02-09 ASSESSMENT — PAIN DESCRIPTION - LOCATION: LOCATION: GENERALIZED

## 2023-02-09 ASSESSMENT — PAIN - FUNCTIONAL ASSESSMENT: PAIN_FUNCTIONAL_ASSESSMENT: ACTIVITIES ARE NOT PREVENTED

## 2023-02-09 ASSESSMENT — PAIN SCALES - GENERAL
PAINLEVEL_OUTOF10: 3
PAINLEVEL_OUTOF10: 0

## 2023-02-09 ASSESSMENT — PAIN DESCRIPTION - DESCRIPTORS: DESCRIPTORS: ACHING

## 2023-02-09 NOTE — PROGRESS NOTES
[address] [facility]             MD James Meneses MD Dub Drummer, APRN-CNP             [address] [facility] Lauren Solorzano, [address]             [phone] fax [phone]      Gastroenterology Progress note . 2/9/2023  Reason for consult:   Abnormal CT           Interval H/P  Tolerating PO intake   Biopsy results Back +julianne for Candida   Started on Fluconazole. Physical Exam  Blood pressure 132/65, pulse 68, temperature 97.8 °F (36.6 °C), temperature source Oral, resp. rate 29, height 5' 5\" (1.651 m), weight 87 lb 8 oz (39.7 kg), SpO2 99 %. Constitutional: Patient is in no distress. Eyes: Pupils equal, round. No icterus. HENT: Oral mucosa is moist.   Pulmonary: No accessory muscle use. No localizing pulmonary findings. Cardiovascular: Heart has a regular rate and rhythm, no JVD. Gastrointestinal: Bowel sounds are present in all four quadrants. Abdomen is soft, nontender, nondistended. Rectal examination deferred. Skin: No jaundice, spider angiomas, or palmar erythema. No purpura. Neuro: Awake,alert, and oriented x3. No gross focal neurologic deficits. Chart and labs reviewed. Impression  1. Candida Esophagitis - EGD appearance and biopsy +julianne for Candida    2. Abnormal Chest CT - NO Active bleeding on EGD to correlate with CT. Esophagitis 2/2 to Candida   3. Normocytic Anemia   4. severe aortic stenosis status post TAVR on Eliquis currently held        Plan  1. Start fluconazole 200 mg Daily for 14 days. 2. Discussed with cardiology, due to interactions with fluconazole Amiodarone D/C and recommend checking QTC interval.   3. Can resume Eliquis from GI perspective with no active bleeding after check Hb.   4. Continue Iron supplementation. Patient's history, exam, and plan of care were discussed. Thank you for allowing us to be involved in the management of this patient. We will SIGN OFF.  Please feel free to call with any questions.       Cathryn Em MD  Gastroenterology   2/9/2023   3:44 PM       Recent Labs     02/09/23  0220   AST 19   ALT 16   ALKPHOS 76   BILITOT 0.3

## 2023-02-09 NOTE — PROGRESS NOTES
Pt was up to the bathroom with therapy and became lightheaded. Therapy checked pts BP and it was 57/39 HR of 60, upon recheck BP 93/61, HR 56. NEHEMIAH George was notified and ordered to hold hydralazine 50mg and gave parameters for Metoprolol. Pts BP is currently 122/62 HR 66 and states that the lightheadedness has resolved.

## 2023-02-09 NOTE — CONSULTS
[address], 1940, 3030/3030-A, 2/9/2023    History  [place]:  The primary encounter diagnosis was Dyspnea and respiratory abnormalities. Diagnoses of Congestive heart failure, unspecified HF chronicity, unspecified heart failure type ([address] Abnormal CT scan, and Upper GI bleed were also pertinent to this visit. Patient  has a past medical history of Aortic stenosis, Arthritis, and Hypertension. Patient  has a past surgical history that includes Carpal tunnel release; Cosmetic surgery; and Upper gastrointestinal endoscopy (N/A, 2/8/2023). Subjective:  Patient states:  \"I feel lightheaded. \"    Pain:  denies pain. Communication with other providers:  Handoff to RN, OT  Restrictions: general precautions, fall risk     Home Setup/Prior level of function  Social/Functional History  Lives With: Spouse, Son  Type of Home: House  Home Layout: One level, Work area in [address] [place] [address] Access: Stairs to enter with rails  Entrance Stairs - Number of Steps: 2  Entrance Stairs - Rails: Both  Bathroom Shower/Tub:  (sponge bathes)  Bathroom Toilet: Handicap height  Home Equipment:  (does not use AD at baseline)  ADL Assistance: Independent  Homemaking Assistance: Independent  Homemaking Responsibilities: Yes  Ambulation Assistance: Independent  Transfer Assistance: Independent  Active : Yes  Occupation: Retired    Examination of body systems (includes body structures/functions, activity/participation limitations):  Observation:  Pt supine in bed with family present upon arrival and agreeable to therapy  Vision:  WFL  Hearing:  WFL  Cardiopulmonary:  no O2 needs  Cognition: WFL, see OT/SLP note for further evaluation. Musculoskeletal  ROM R/L:  WFL. Strength R/L:  4-/5, moderate impairment in function and endurance.     Neuro:  reports intermittent n/t in toes      Mobility:  Rolling L/R:  SBA  Supine to sit:  SBA with cues provided for sequencing  Transfers: pt completed STS from EOB CGA, to commode CGA, from commode min A, to BSC min A, and stand pivot from [facility] to chair mod A. Cues provided for hand placement and sequencing throughout. Sitting balance:  good. Standing balance:  fair+, pt stood at sink CGA with progressive lightheadedness leading to [facility] pulled up behind pt. BP taken at 53/39 (46). RN notified. After in reclined position for a few minutes, BP 93/61 (72). Rn stating to leave pt in recliner. Gait: pt ambulated 10' without a device CGA with decreased kane, narrow VALE, and overall unsteadiness. Cues provided for pathway. [facility] 6 Clicks Inpatient Mobility:  AM-PAC Inpatient Mobility Raw Score : 11    Safety: patient left in chair with alarm on, family in room, call light within reach, RN notified, gait belt used. Assessment:  Pt is an [de-identified] y.o. female admitted to the hospital for acute on chronic diastolic heart failure. Pt is typically independent with all ambulation and transfers without a device. Pt is currently performing bed mobility SBA, transferring mod A, and ambulating 10' without a device CGA. Pt is presenting with decreased endurance, impaired balance, impaired gait, decreased strength, impaired transfers. Pt would benefit from continued acute care PT as well as SNF placement upon discharge to continue to address impairments. Complexity: moderate    Prognosis: Good, no significant barriers to participation at this time.      General Plan: 3-5 times per week/week     Equipment: TBD at next level of care    Goals:  Short Term Goals  Time Frame for Short Term Goals: 1 week  Short Term Goal 1: Pt to complete all bed mobility mod I  Short Term Goal 2: pt to complete all STS transfers to/from bed, commode, and chair CGA  Short Term Goal 3: Pt to ambulate 25' with LRAD CGA       Treatment plan:  Bed mobility, transfers, balance, gait, TA, TX    Recommendations for NURSING mobility: amb with gait belt and RW    Time:   Time in: 1109  Time out: 1136  Timed treatment minutes: 10  Total time: 27    Electronically signed by:    Calvin Quiros, PT  2/9/2023, 12:42 PM

## 2023-02-09 NOTE — PROGRESS NOTES
Comprehensive Nutrition Assessment    Type and Reason for Visit:  Reassess    Nutrition Recommendations/Plan:   Continue current diet and supplements     Malnutrition Assessment:  Malnutrition Status:  Severe malnutrition (02/09/23 1447)    Context:  Chronic Illness     Findings of the 6 clinical characteristics of malnutrition:  Energy Intake:  No significant decrease in energy intake  Weight Loss:  Greater than 5% over 1 month     Body Fat Loss:  Severe body fat loss Orbital   Muscle Mass Loss:  Severe muscle mass loss Clavicles (pectoralis & deltoids)  Fluid Accumulation:  Mild     Strength:  Not Performed    Nutrition Assessment:    Pt is consuming 51-75% of her meals. No suplements intake has been recored in the pt chart. Will continue supplements and diet. She is at moderate nutritional risk a this time. Nutrition Related Findings:    2/6: K 3.4, transferrin 96; non-pitting and 1+ pitting edema to BLE. Wound Type: None       Current Nutrition Intake & Therapies:    Average Meal Intake: 51-75%  Average Supplements Intake: Unable to assess  ADULT DIET; Regular; Low Fat/Low Chol/High Fiber/RAFAEL    Anthropometric Measures:  Height: 5' 5\" (165.1 cm)  Ideal Body Weight (IBW): 125 lbs (57 kg)    Admission Body Weight: 87 lb 11.9 oz (39.8 kg)  Current Body Weight: 87 lb 11.9 oz (39.8 kg),   IBW.  Weight Source: Standing Scale  Current BMI (kg/m2): 14.6  Usual Body Weight: 93 lb (42.2 kg)  % Weight Change (Calculated): -5.7  Weight Adjustment For: No Adjustment                 BMI Categories: Underweight (BMI less than 22) age over 72    Estimated Daily Nutrient Needs:  Energy Requirements Based On: Kcal/kg  Weight Used for Energy Requirements: Ideal  Energy (kcal/day): 1699-5442 (25-30kcal/kg of IBW)  Weight Used for Protein Requirements: Ideal  Protein (g/day): 75-85 (1.37g/kg of IBW)  Method Used for Fluid Requirements: 1 ml/kcal  Fluid (ml/day): 1500    Nutrition Diagnosis:   Underweight related to catabolic illness, inadequate protein-energy intake, cardiac dysfunction as evidenced by      Nutrition Interventions:   Food and/or Nutrient Delivery: Continue Current Diet, Start Oral Nutrition Supplement  Nutrition Education/Counseling: Education completed, Survival skills/brief education completed  Coordination of Nutrition Care: Continue to monitor while inpatient       Goals:     Goals: Meet at least 75% of estimated needs       Nutrition Monitoring and Evaluation:      Food/Nutrient Intake Outcomes: Food and Nutrient Intake, Supplement Intake  Physical Signs/Symptoms Outcomes: Weight, Nutrition Focused Physical Findings    Discharge Planning:    Continue Oral Nutrition Supplement     Theron Hannon RD, LD  Contact: [phone]

## 2023-02-09 NOTE — PROGRESS NOTES
[address] [place] [address] ACUTE CARE OCCUPATIONAL THERAPY EVALUATION    Carlyn Diaz, 1940, 3030/3030-A, 2/9/2023    Discharge Recommendation: Santana Stein      History:  [place]:  The primary encounter diagnosis was Dyspnea and respiratory abnormalities. Diagnoses of Congestive heart failure, unspecified HF chronicity, unspecified heart failure type ([address] Abnormal CT scan, and Upper GI bleed were also pertinent to this visit. Subjective:  Patient states: \"I'm feeling really weak and lightheaded standing here\"  Pain: Pt denied pain this date  Communication with other providers: co-pilar w/ PT Anastacio Cruz; Handoff to RN  Restrictions: General Precautions, Fall Risk, Telemetry, Pulse Ox, BP cuff    Home Setup/Prior level of function:  Social/Functional History  Lives With: Spouse, Son  Type of Home: House  Home Layout: One level, Work area in [address] [place] [address] Access: Stairs to enter with rails  Entrance Stairs - Number of Steps: 2  Entrance Stairs - Rails: Both  Bathroom Shower/Tub:  (sponge bathes)  Bathroom Toilet: Handicap height  Home Equipment: None  ADL Assistance: Independent  Homemaking Assistance: Independent  Homemaking Responsibilities: Yes  Ambulation Assistance: Independent (no AD)  Transfer Assistance: Independent  Active : Yes  Occupation: Retired    Examination:  Observation: Supine in bed upon arrival w/  and son present and supportive. Pt agreeable to evaluation. Vision: WFL (glasses)  Hearing: WFL  Vitals: Pt stood at sink and verbalized feeling weak and lightheaded. BSC pulled behind pt and BP taken while seated- BP 53/39 (46). RN was notified and pt transferred to recliner and positioned to a reclined position. Pt BP 93/61 (72) in reclined position and RN stated to leave pt in recliner.      Body Systems and functions:  ROM: WFL in all joints BL UEs  Strength: 4 to 4+/5 MMT in BL UEs  Sensation: Pt reports some tingling in L hand and reports boutonniere deformity in finger. Pt sensation intact in BL UEs  Tone: Normal  Coordination: WFL  Perception: WNL    Activities of Daily Living (ADLs):  Feeding: Independent  Grooming: CGA (standing at sink)   UB bathing: SBA (while seated)   LB bathing: Mod A (reaching distal BL LEs and buttocks)  UB dressing: SBA (while seated)   LB dressing: Max A (brief mgmt- assist to thread distal BL LEs and manage to knees, pt able to manage brief up to hips w/ CGA for steadying)  Toileting: Mod A (assist for linda care; pt urinated while seated on toilet, pt able to manage brief both directions)    Cognitive and Psychosocial Functioning:  Overall cognitive status: appears WFL  Affect: Normal     Balance:   Sitting: SBA in unsupported sitting EOB  Standing: CGA w/ no AD (became lightheaded at sink and unsteady regressing to Min A)    Functional Mobility:  Bed Mobility: SBA w/ HOB elevated and cues for sequencing   Transfers: SBA sit to stand from EOB, CGA stand to sit to toilet (cues for use of grab bar), Min A sit to stand from [facility], Min A stand to sit to [facility], Mod A SPT from [facility] to chair    Ambulation: CGA w/o AD to bathroom with some unsteadiness (see PT evaluation for gait assessment)      AM-PAC 6 click short form for inpatient daily activity:   How much help from another person does the patient currently need. .. Unable  Dep A Lot  Max A A Lot   Mod A A Little  Min A A Little   CGA  SBA None   Mod I  Indep  Sup   1. Putting on and taking off regular lower body clothing? [] 1    [x] 2   [] 2   [] 3   [] 3   [] 4      2. Bathing (including washing, rinsing, drying)? [] 1   [] 2   [] 2 [x] 3 [] 3 [] 4   3. Toileting, which includes using toilet, bedpan, or urinal? [] 1    [] 2   [x] 2   [] 3   [] 3   [] 4     4. Putting on and taking off regular upper body clothing? [] 1   [] 2   [] 2   [] 3   [x] 3    [] 4      5. Taking care of personal grooming such as brushing teeth? [] 1   [] 2    [] 2 [] 3    [x] 3   [] 4      6. Eating meals?    [] 1   [] 2   [] 2   [] 3   [] 3   [x] 4      Raw Score:  17     [24=0% impaired(CH), 23=1-19%(CI), 20-22=20-39%(CJ), 15-19=40-59%(CK), 10-14=60-79%(CL), 7-9=80-99%(CM), 6=100%(CN)]     Treatment:  Self Care Training:   Cues were given for safety, sequence, UE/LE placement, visual cues, and balance. Activities performed today included LB dressing tasks, toileting       Safety Measures: Gait belt used, Left in Chair, Alarm in place    Assessment:  Pt is a [de-identified] yo female w/  has a past medical history of Aortic stenosis, Arthritis, and Hypertension. Pt admitted w/ SOB and dx w/ acute on chronic diastolic heart failure. Pt lives at home w/ her  and son. Pt reports at baseline she is IND w/ ADLs, IADLs, and functional mobility (no AD). Pt presents w/ the above impairments and would benefit from continued acute care OT services. Recommend SNF at discharge to increase functional activity tolerance and independence w/ ADL tasks. Complexity: Moderate  Prognosis: Good  Plan: 3x/week      Goals:  1. Pt will complete all aspects of bed mobility for EOB/OOB ADLs w/ Supervision and HOB flat  2. Pt will complete UB/LB bathing CGA w/ long handled sponge  3. Pt will complete all aspects of LB dressing Min A w/ AE PRN  4. Pt will complete all functional transfers to and from bed, chair, toilet, shower chair SBA  5. Pt will ambulate HH distance to bathroom for toileting SBA w/ LRAD  6. Pt will complete all aspects of toileting task CGA  7. Pt will complete oral hygiene/grooming routine in standing at sink SBA w/ initial set up and no seated rest breaks   8.  Pt will complete ther ex/ther act with focus on UE strengthening and functional activity tolerance while standing >8 minutes with stable vitals             Time:   Time in: 1109  Time out: 1136  Timed treatment minutes: 10  Total time: 27      Electronically signed by:    Mario Wen S/OT  LUDY Sanchez/L, MAGALIS, [id number]

## 2023-02-09 NOTE — PROGRESS NOTES
Daily Progress Note  Subjective:    Pt. Awake, alert and feeling ok  HR stable, NSR, BP stable  No CP, Sob much improved today    Attending Note:  Feeling better  No chest pain   Remain in sinus no further arrhythmia  Off amiodarone due to antifungal and prolong QT--  Severe esophagitis noted -fungal on meds  Sp TAVR stable  HTN adjusted meds  Home soon increase activity   BNP improved   Moderate MR medical treatment  Keep on low dose lopressor     Impression and Plan:     Acute on Chronic HFpEF    EF preserved on echo    S/p TAVR- appears stable on echo    Mod-sev MR noted on echo as well-stable from previous    BNP elevated- on lasix and feeling much better now- on RA on exam-repeat BNP much improved     WCT    Noted 2/7/23 around 8:30    Irregular and no axis switch noted so believe it is more PAF with aberrant conduction    She was started on amio however strong interaction with diflucan which she needs to take now so stopped amio    Instead keep on lopressor to control it and watch tele    No Ac for now as concerned for GI bleed now as well    Tele appears stable now     CTA chest done to r/o PE and noted concern for GI bleed at GE junction with contrast  EGD showed candida infection- on diflucan now per GI  Will follow-stable from cardiac standpoint     Most Recent Echo  2/6/23  Summary   Left ventricular systolic function is normal.   Ejection fraction is visually estimated at 55%. Grade II diastolic dysfunction. S/P normal functioning TAVR valve; Mean Gradient: 7mmHg. No evidence of paravalvular leakage. Severe mitral annular calcification. Moderate to severe mitral regurgitation. Severe tricuspid regurgitation; RVSP: 60.13mmHg; Severe PHTN. No evidence of any pericardial effusion. Inferior vena cava is dilated, measuring at 2.5cm, but does collapse 50%   with respiration. Most Recent Heart Cath  5/31/22  IMPRESSION:  1. Left main is patent. 2.  LAD has mild disease noted.   3.  Circ has mild disease noted. 4.  RCA ostium calcium noted. No dampening of pressure noted. Just  mild disease noted. 5.  Aortic valve is not crossed. 6.  There is a significant mitral annular calcification present. 7.  Right heart pressures are normal.  8.  The patient has nonobstructive coronary artery disease present. 9.  Significant mitral annular calcification noted. [id number] Score for Atrial Fibrillation Stroke Risk    Risk   Factors   Component Value   C CHF Yes 1   H HTN No 0   A2 Age >= 76 Yes,  [de-identified] y.o.) 2   D DM No 0   S2 Prior Stroke/TIA No 0   V Vascular Disease No 0   A Age 74-69 No,  [de-identified] y.o.) 0   Sc Sex female 1     [id number]  Score   4   Score last updated 2/7/23 57:17 AM EST     Click here for a link to the UpToDate guideline \"Atrial Fibrillation: Anticoagulation therapy to prevent embolization     Disclaimer: Risk Score calculation is dependent on accuracy of patient problem list and past encounter diagnosis. Radiology      Impression   No evidence of pulmonary embolism. Foci of subsegmental consolidation lung bases, diffuse mild ground-glass   infiltrative changes mid-basilar regions, small bilateral pleural effusions. Correlation with clinical evidence of bibasilar atelectasis or pneumonia   suggested. Unremarkable CT appearance aortic valve prosthesis, fusiform aneurysmal   ectasia of ascending aorta to maximal diameter of 4 cm noted and unchanged   compared to 05/28/2022. Heavy atherosclerotic calcification mitral valve   noted. Short-segment circumferential mucosal thickening midthoracic esophagus at the   level of the aortic or pulmonary window unchanged compared to 05/28/2022   suggesting benign process such as esophagitis. Small hiatal hernia with   intense contrast accumulation near the gastroesophageal junction. Active GI   bleeding near the gastroesophageal junction should be considered.   Endoscopic   evaluation would be helpful for further evaluation. PAST MEDICAL HISTORY:  Severe aortic stenosis, moderate-to-severe mitral  regurgitation, hypertension, hyperlipidemia, diastolic heart failure,  and arthritis. PAST SURGICAL HISTORY:  Carpal tunnel syndrome. She had a TAVR done in  07/2022 at [facility].     SOCIAL HISTORY:  She does not smoke. She does not drink. ALLERGIES:  Deborra Pro. HOME MEDICATIONS:  She was on losartan 100 mg daily. , Eliquis 2.5 mg  b.i.d., Lopressor 50 mg b.i.d., Lasix 20 mg daily, Synthroid, and  Lipitor 20 mg daily at bedtime.       Objective:   /73   Pulse 70   Temp 97.8 °F (36.6 °C) (Oral)   Resp 25   Ht 5' 5\" (1.651 m)   Wt 87 lb 8 oz (39.7 kg)   SpO2 98%   BMI 14.56 kg/m²     Intake/Output Summary (Last 24 hours) at 2/9/2023 5295  Last data filed at 2/9/2023 0840  Gross per 24 hour   Intake 250 ml   Output --   Net 250 ml       Medications:   Scheduled Meds:   losartan  50 mg Oral Daily    [START ON 2/10/2023] fluconazole  100 mg Oral Daily    torsemide  20 mg Oral Daily    hydrALAZINE  50 mg Oral 2 times per day    potassium chloride  10 mEq Oral BID    pantoprazole  40 mg IntraVENous BID    metoprolol tartrate  25 mg Oral BID    atorvastatin  20 mg Oral Nightly    acetaminophen  1,000 mg Oral QAM    apixaban  2.5 mg Oral BID    levothyroxine  100 mcg Oral Daily    sodium chloride flush  5-40 mL IntraVENous 2 times per day    iron sucrose  200 mg IntraVENous Q24H      Infusions:   sodium chloride        PRN Meds:  sodium chloride flush, sodium chloride, ondansetron **OR** ondansetron, polyethylene glycol, acetaminophen **OR** acetaminophen, potassium chloride **OR** potassium alternative oral replacement **OR** potassium chloride, magnesium sulfate, nitroGLYCERIN     Physical Exam:  Vitals:    02/09/23 0836   BP:    Pulse: 70   Resp: 25   Temp:    SpO2: 98%        General: AAO, NAD  Chest: Nontender  Cardiac: First and Second Heart Sounds are Normal, No Murmurs or Gallops noted  Lungs:Clear to auscultation and percussion. Abdomen: Soft, NT, ND, +BS  Extremities: No clubbing, no edema  Vascular:  Equal 2+ peripheral pulses. Lab Data:  CBC:   Recent Labs     02/06/23  1037 02/07/23  1027   WBC 8.1 8.2   HGB 9.1* 11.4*   HCT 29.7* 37.0   MCV 87.9 86.0    354     BMP:   Recent Labs     02/07/23  0202 02/08/23  0442 02/09/23  0220    140 139   K 3.4* 4.5 4.3   CL 99 100 98*   CO2 28 29 30   BUN 16 22 24*   CREATININE 0.7 0.8 1.1     LIVER PROFILE:   Recent Labs     02/06/23  1037 02/09/23  0220   AST 41* 19   ALT 27 16   BILITOT 0.5 0.3   ALKPHOS 80 76     PT/INR:   Recent Labs     02/06/23  1037   PROTIME 16.3*   INR 1.26     APTT:   Recent Labs     02/06/23  1037   APTT 36.2     BNP:  No results for input(s): BNP in the last 72 hours.       Assessment:  Patient Active Problem List    Diagnosis Date Noted    Abnormal CT scan, esophagus 02/08/2023    Normocytic anemia 02/08/2023    Acute on chronic diastolic HF (heart failure) ([facility] 02/06/2023    Acute on chronic diastolic heart failure (HCC) 56/89/9131    Acute diastolic CHF (congestive heart failure), NYHA class 4 ([facility] 06/27/2022    Severe aortic valve stenosis 05/31/2022    Nonrheumatic aortic valve stenosis 05/30/2022    Nonrheumatic mitral valve stenosis 05/30/2022    Dyspnea 05/28/2022       Electronically signed by Mili Flores PA-C on 2/9/2023 at 9:09 AM    Electronically signed by Verdis Romberg, MD on 2/9/23 at 11:15 AM EST

## 2023-02-09 NOTE — PLAN OF CARE
Problem: Discharge Planning  Goal: Discharge to home or other facility with appropriate resources  Outcome: Progressing  Flowsheets (Taken 2/9/2023 0826)  Discharge to home or other facility with appropriate resources:   Identify barriers to discharge with patient and caregiver   Arrange for needed discharge resources and transportation as appropriate   Identify discharge learning needs (meds, wound care, etc)   Refer to discharge planning if patient needs post-hospital services based on physician order or complex needs related to functional status, cognitive ability or social support system     Problem: Safety - Adult  Goal: Free from fall injury  Outcome: Progressing  Flowsheets (Taken 2/9/2023 0829)  Free From Fall Injury: Instruct family/caregiver on patient safety     Problem: Skin/Tissue Integrity  Goal: Absence of new skin breakdown  Description: 1. Monitor for areas of redness and/or skin breakdown  2. Assess vascular access sites hourly  3. Every 4-6 hours minimum:  Change oxygen saturation probe site  4. Every 4-6 hours:  If on nasal continuous positive airway pressure, respiratory therapy assess nares and determine need for appliance change or resting period.   Outcome: Progressing     Problem: Nutrition Deficit:  Goal: Optimize nutritional status  Outcome: Progressing     Problem: Pain  Goal: Verbalizes/displays adequate comfort level or baseline comfort level  Outcome: Progressing  Flowsheets (Taken 2/9/2023 0825)  Verbalizes/displays adequate comfort level or baseline comfort level:   Encourage patient to monitor pain and request assistance   Assess pain using appropriate pain scale   Administer analgesics based on type and severity of pain and evaluate response   Implement non-pharmacological measures as appropriate and evaluate response   Consider cultural and social influences on pain and pain management   Notify Licensed Independent Practitioner if interventions unsuccessful or patient reports new pain     Problem: Chronic Conditions and Co-morbidities  Goal: Patient's chronic conditions and co-morbidity symptoms are monitored and maintained or improved  Outcome: Progressing  Flowsheets (Taken 2/9/2023 0826)  Care Plan - Patient's Chronic Conditions and Co-Morbidity Symptoms are Monitored and Maintained or Improved:   Monitor and assess patient's chronic conditions and comorbid symptoms for stability, deterioration, or improvement   Collaborate with multidisciplinary team to address chronic and comorbid conditions and prevent exacerbation or deterioration   Update acute care plan with appropriate goals if chronic or comorbid symptoms are exacerbated and prevent overall improvement and discharge     Problem: ABCDS Injury Assessment  Goal: Absence of physical injury  Outcome: Progressing  Flowsheets (Taken 2/9/2023 0829)  Absence of Physical Injury: Implement safety measures based on patient assessment

## 2023-02-09 NOTE — CARE COORDINATION
CM in to see Pt to follow up on discharge planning. Pt  and son present. Pt and family are not in agreement with therapy recommendation of SNF. They are agreeable to home care, choice of [facility]. CM call to Dr. Abbey Jimenez to update, [facility]. order requested.      Referral faxed to [address] [place], [place] [address] [place]

## 2023-02-09 NOTE — PROGRESS NOTES
INTERNAL MEDICINE PROGRESS NOTE        Radha Lau   1940   Primary Care Physician:  Adela De La Fuente Date: 2/6/2023     Subjective:   Patient's breathing is improved at this time. EGD report reviewed. Started on diflucan, she is off of amiodarone.         Objective:   /73   Pulse 70   Temp 97.8 °F (36.6 °C) (Oral)   Resp 25   Ht 5' 5\" (1.651 m)   Wt 87 lb 8 oz (39.7 kg)   SpO2 98%   BMI 14.56 kg/m²    General appearance: alert, appears stated age, and cooperative  Head: Normocephalic, without obvious abnormality, atraumatic  Neck: no adenopathy and supple, symmetrical, trachea midline  Lungs: clear to auscultation bilaterally  Heart: regular rate and rhythm and S1, S2 normal  Abdomen: soft, non-tender; bowel sounds normal; no masses,  no organomegaly  Extremities: no clubbing, cyanosis or edema  Neurologic: Grossly normal    Data Review  Lab Results   Component Value Date     02/09/2023    K 4.3 02/09/2023    CL 98 (L) 02/09/2023    CO2 30 02/09/2023    CREATININE 1.1 02/09/2023    BUN 24 (H) 02/09/2023    CALCIUM 9.3 02/09/2023     Lab Results   Component Value Date    WBC 8.2 02/07/2023    HGB 11.4 (L) 02/07/2023    HCT 37.0 02/07/2023    MCV 86.0 02/07/2023     02/07/2023     INR/Prothrombin Time      Meds:    losartan  50 mg Oral Daily    [START ON 2/10/2023] fluconazole  100 mg Oral Daily    torsemide  20 mg Oral Daily    hydrALAZINE  50 mg Oral 2 times per day    potassium chloride  10 mEq Oral BID    pantoprazole  40 mg IntraVENous BID    metoprolol tartrate  25 mg Oral BID    atorvastatin  20 mg Oral Nightly    acetaminophen  1,000 mg Oral QAM    apixaban  2.5 mg Oral BID    levothyroxine  100 mcg Oral Daily    sodium chloride flush  5-40 mL IntraVENous 2 times per day    iron sucrose  200 mg IntraVENous Q24H     PRN Meds: sodium chloride flush, sodium chloride, ondansetron **OR** ondansetron, polyethylene glycol, acetaminophen **OR** acetaminophen, potassium chloride **OR** potassium alternative oral replacement **OR** potassium chloride, magnesium sulfate, nitroGLYCERIN    Assessment/Plan:   Patient Active Hospital Problem List:  Patient Active Problem List   Diagnosis    Dyspnea    Nonrheumatic aortic valve stenosis    Nonrheumatic mitral valve stenosis    Severe aortic valve stenosis    Acute on chronic diastolic heart failure ([facility])    Acute diastolic CHF (congestive heart failure), NYHA class 4 ([facility])    Acute on chronic diastolic HF (heart failure) ([facility])    Abnormal CT scan, esophagus    Normocytic anemia         Plan:  Changed medications to po  Ambulate today  If stable, possible discharge tomorrow.

## 2023-02-09 NOTE — PROGRESS NOTES
Patient instructed and educated on [place]. Patient able to do 500 ml. Vital capacity. Patient's goal is 2000ml.  Electronically signed by Sharmila Bae RCP on 2/9/2023 at 12:21 PM

## 2023-02-10 VITALS
HEIGHT: 65 IN | TEMPERATURE: 97.8 F | WEIGHT: 88 LBS | DIASTOLIC BLOOD PRESSURE: 79 MMHG | SYSTOLIC BLOOD PRESSURE: 136 MMHG | BODY MASS INDEX: 14.66 KG/M2 | RESPIRATION RATE: 19 BRPM | OXYGEN SATURATION: 95 % | HEART RATE: 68 BPM

## 2023-02-10 LAB
ANION GAP SERPL CALCULATED.3IONS-SCNC: 13 MMOL/L (ref 4–16)
BUN SERPL-MCNC: 35 MG/DL (ref 6–23)
CALCIUM SERPL-MCNC: 9.6 MG/DL (ref 8.3–10.6)
CHLORIDE BLD-SCNC: 97 MMOL/L (ref 99–110)
CO2: 29 MMOL/L (ref 21–32)
CREAT SERPL-MCNC: 1.1 MG/DL (ref 0.6–1.1)
GFR SERPL CREATININE-BSD FRML MDRD: 51 ML/MIN/1.73M2
GLUCOSE SERPL-MCNC: 111 MG/DL (ref 70–99)
MAGNESIUM: 2.3 MG/DL (ref 1.8–2.4)
POTASSIUM SERPL-SCNC: 4.6 MMOL/L (ref 3.5–5.1)
SODIUM BLD-SCNC: 139 MMOL/L (ref 135–145)

## 2023-02-10 PROCEDURE — C9113 INJ PANTOPRAZOLE SODIUM, VIA: HCPCS | Performed by: INTERNAL MEDICINE

## 2023-02-10 PROCEDURE — 36415 COLL VENOUS BLD VENIPUNCTURE: CPT

## 2023-02-10 PROCEDURE — 6370000000 HC RX 637 (ALT 250 FOR IP): Performed by: NURSE PRACTITIONER

## 2023-02-10 PROCEDURE — 6370000000 HC RX 637 (ALT 250 FOR IP): Performed by: INTERNAL MEDICINE

## 2023-02-10 PROCEDURE — 80048 BASIC METABOLIC PNL TOTAL CA: CPT

## 2023-02-10 PROCEDURE — 83735 ASSAY OF MAGNESIUM: CPT

## 2023-02-10 PROCEDURE — 6360000002 HC RX W HCPCS: Performed by: INTERNAL MEDICINE

## 2023-02-10 PROCEDURE — 2580000003 HC RX 258: Performed by: NURSE PRACTITIONER

## 2023-02-10 RX ORDER — POTASSIUM CHLORIDE 750 MG/1
10 TABLET, EXTENDED RELEASE ORAL 2 TIMES DAILY
Qty: 60 TABLET | Refills: 3 | Status: SHIPPED | OUTPATIENT
Start: 2023-02-10

## 2023-02-10 RX ORDER — LOSARTAN POTASSIUM 50 MG/1
50 TABLET ORAL DAILY
Qty: 30 TABLET | Refills: 3 | Status: SHIPPED | OUTPATIENT
Start: 2023-02-10 | End: 2023-02-10 | Stop reason: SDUPTHER

## 2023-02-10 RX ORDER — TORSEMIDE 20 MG/1
20 TABLET ORAL DAILY
Qty: 30 TABLET | Refills: 3 | Status: SHIPPED | OUTPATIENT
Start: 2023-02-10

## 2023-02-10 RX ORDER — LOSARTAN POTASSIUM 50 MG/1
50 TABLET ORAL DAILY
Qty: 30 TABLET | Refills: 3 | Status: SHIPPED | OUTPATIENT
Start: 2023-02-10

## 2023-02-10 RX ORDER — FLUCONAZOLE 100 MG/1
100 TABLET ORAL DAILY
Qty: 13 TABLET | Refills: 0 | Status: SHIPPED | OUTPATIENT
Start: 2023-02-10 | End: 2023-02-23

## 2023-02-10 RX ADMIN — APIXABAN 2.5 MG: 2.5 TABLET, FILM COATED ORAL at 08:22

## 2023-02-10 RX ADMIN — TORSEMIDE 20 MG: 20 TABLET ORAL at 08:22

## 2023-02-10 RX ADMIN — METOPROLOL TARTRATE 25 MG: 25 TABLET, FILM COATED ORAL at 08:22

## 2023-02-10 RX ADMIN — SODIUM CHLORIDE, PRESERVATIVE FREE 10 ML: 5 INJECTION INTRAVENOUS at 08:23

## 2023-02-10 RX ADMIN — LOSARTAN POTASSIUM 50 MG: 25 TABLET, FILM COATED ORAL at 08:22

## 2023-02-10 RX ADMIN — ACETAMINOPHEN 1000 MG: 500 TABLET ORAL at 08:21

## 2023-02-10 RX ADMIN — FLUCONAZOLE 100 MG: 100 TABLET ORAL at 08:22

## 2023-02-10 RX ADMIN — PANTOPRAZOLE SODIUM 40 MG: 40 INJECTION, POWDER, FOR SOLUTION INTRAVENOUS at 08:21

## 2023-02-10 RX ADMIN — POTASSIUM CHLORIDE 10 MEQ: 1500 TABLET, EXTENDED RELEASE ORAL at 08:21

## 2023-02-10 RX ADMIN — LEVOTHYROXINE SODIUM 100 MCG: 100 TABLET ORAL at 06:21

## 2023-02-10 ASSESSMENT — PAIN SCALES - GENERAL: PAINLEVEL_OUTOF10: 0

## 2023-02-10 ASSESSMENT — PAIN SCALES - WONG BAKER
WONGBAKER_NUMERICALRESPONSE: 0
WONGBAKER_NUMERICALRESPONSE: 0

## 2023-02-10 NOTE — PLAN OF CARE
Problem: Discharge Planning  Goal: Discharge to home or other facility with appropriate resources  2/10/2023 0823 by Luis Manuel Nieves RN  Outcome: Progressing  2/9/2023 2311 by Ramírez Thomas RN  Outcome: Progressing

## 2023-02-10 NOTE — PROGRESS NOTES
Patient discharged from unit, this nurse removed IVS, patient educated on follow up appointments and encouraged to keep them, patient educated on medication administration, patient escorted to lobby by staff

## 2023-02-10 NOTE — CARE COORDINATION
[facility] Liaison spoke with pts daughter in law and aware of discharge & will initiate aKylin 78. Verified address, pcp and number.

## 2023-02-10 NOTE — PLAN OF CARE
Problem: Discharge Planning  Goal: Discharge to home or other facility with appropriate resources  2/10/2023 0824 by Samuel Davies RN  Outcome: Progressing  2/10/2023 0823 by Samuel Davies RN  Outcome: Progressing  2/9/2023 2311 by Jeramy Shin RN  Outcome: Progressing

## 2023-02-10 NOTE — PROGRESS NOTES
Daily Progress Note      Subjective:    Patient is awake alert no chest pain  Heart rate and BP stable  Going home today  EGD showed esophagitis fungal on treatment   Off amiodarone due to meds interagation  S/p TAVR stable  HTN on meds   WCT/aberrant conduction keep on betablockers   F/u In office in few weeks     Most Recent Echo  2/6/23  Summary   Left ventricular systolic function is normal.   Ejection fraction is visually estimated at 55%. Grade II diastolic dysfunction. S/P normal functioning TAVR valve; Mean Gradient: 7mmHg. No evidence of paravalvular leakage. Severe mitral annular calcification. Moderate to severe mitral regurgitation. Severe tricuspid regurgitation; RVSP: 60.13mmHg; Severe PHTN. No evidence of any pericardial effusion. Inferior vena cava is dilated, measuring at 2.5cm, but does collapse 50%   with respiration. Most Recent Heart Cath  5/31/22  IMPRESSION:  1. Left main is patent. 2.  LAD has mild disease noted. 3.  Circ has mild disease noted. 4.  RCA ostium calcium noted. No dampening of pressure noted. Just  mild disease noted. 5.  Aortic valve is not crossed. 6.  There is a significant mitral annular calcification present. 7.  Right heart pressures are normal.  8.  The patient has nonobstructive coronary artery disease present. 9.  Significant mitral annular calcification noted.     Radiology    Objective:   /79   Pulse 68   Temp 97.8 °F (36.6 °C) (Oral)   Resp 19   Ht 5' 5\" (1.651 m)   Wt 88 lb (39.9 kg)   SpO2 95%   BMI 14.64 kg/m²   No intake or output data in the 24 hours ending 02/10/23 1039    Medications:   Scheduled Meds:   losartan  50 mg Oral Daily    fluconazole  100 mg Oral Daily    torsemide  20 mg Oral Daily    [Held by provider] hydrALAZINE  50 mg Oral 2 times per day    potassium chloride  10 mEq Oral BID    pantoprazole  40 mg IntraVENous BID    metoprolol tartrate  25 mg Oral BID    atorvastatin  20 mg Oral Nightly acetaminophen  1,000 mg Oral QAM    apixaban  2.5 mg Oral BID    levothyroxine  100 mcg Oral Daily    sodium chloride flush  5-40 mL IntraVENous 2 times per day    iron sucrose  200 mg IntraVENous Q24H      Infusions:   sodium chloride 25 mL (02/09/23 1457)      PRN Meds:  sodium chloride flush, sodium chloride, ondansetron **OR** ondansetron, polyethylene glycol, acetaminophen **OR** acetaminophen, potassium chloride **OR** potassium alternative oral replacement **OR** potassium chloride, magnesium sulfate, nitroGLYCERIN     Physical Exam:  Vitals:    02/10/23 0815   BP: 136/79   Pulse: 68   Resp: 19   Temp: 97.8 °F (36.6 °C)   SpO2:         General: AAO, NAD  Chest: Nontender  Cardiac: First and Second Heart Sounds are Normal, No Murmurs or Gallops noted  Lungs:Clear to auscultation and percussion. Abdomen: Soft, NT, ND, +BS  Extremities: No clubbing, no edema  Vascular:  Equal 2+ peripheral pulses. Lab Data:  CBC: No results for input(s): WBC, HGB, HCT, MCV, PLT in the last 72 hours. BMP:   Recent Labs     02/08/23  0442 02/09/23  0220 02/10/23  0755    139 139   K 4.5 4.3 4.6    98* 97*   CO2 29 30 29   BUN 22 24* 35*   CREATININE 0.8 1.1 1.1     LIVER PROFILE:   Recent Labs     02/09/23  0220   AST 19   ALT 16   BILITOT 0.3   ALKPHOS 76     PT/INR: No results for input(s): PROTIME, INR in the last 72 hours. APTT: No results for input(s): APTT in the last 72 hours. BNP:  No results for input(s): BNP in the last 72 hours.       Assessment:  Patient Active Problem List    Diagnosis Date Noted    Severe malnutrition ([facility] 02/09/2023    Abnormal CT scan, esophagus 02/08/2023    Normocytic anemia 02/08/2023    Acute on chronic diastolic HF (heart failure) ([facility] 02/06/2023    Acute on chronic diastolic heart failure (HCC) 72/42/4240    Acute diastolic CHF (congestive heart failure), NYHA class 4 (HCC) 06/27/2022    Severe aortic valve stenosis 05/31/2022    Nonrheumatic aortic valve stenosis 05/30/2022    Nonrheumatic mitral valve stenosis 05/30/2022    Dyspnea 05/28/2022       Electronically signed by Lo Russell MD on 2/10/2023 at 10:39 AM

## 2023-02-10 NOTE — PLAN OF CARE
Problem: Discharge Planning  Goal: Discharge to home or other facility with appropriate resources  Outcome: Progressing     Problem: Safety - Adult  Goal: Free from fall injury  Outcome: Progressing     Problem: Skin/Tissue Integrity  Goal: Absence of new skin breakdown  Description: 1. Monitor for areas of redness and/or skin breakdown  2. Assess vascular access sites hourly  3. Every 4-6 hours minimum:  Change oxygen saturation probe site  4. Every 4-6 hours:  If on nasal continuous positive airway pressure, respiratory therapy assess nares and determine need for appliance change or resting period.   Outcome: Progressing     Problem: Nutrition Deficit:  Goal: Optimize nutritional status  Outcome: Progressing     Problem: Pain  Goal: Verbalizes/displays adequate comfort level or baseline comfort level  Outcome: Progressing     Problem: Chronic Conditions and Co-morbidities  Goal: Patient's chronic conditions and co-morbidity symptoms are monitored and maintained or improved  Outcome: Progressing     Problem: ABCDS Injury Assessment  Goal: Absence of physical injury  Outcome: Progressing

## 2023-12-22 NOTE — DISCHARGE INSTR - COC
Continuity of Care Form    Patient Name: Chica Godfrey   :  1940  MRN:  [id number]    Admit date:  2022  Discharge date:  ***    Code Status Order: Full Code   Advance Directives:      Admitting Physician:  Luis Gibson MD  PCP: Lynn Vang    Discharging Nurse: [facility] Unit/Room#: 4103/4103-A  Discharging Unit Phone Number: ***    Emergency Contact:   Extended Emergency Contact Information  Primary Emergency Contact: Dennis Coleman  Address: [address] [place] [address] Phone: [phone]  Relation: Spouse  Secondary Emergency Contact: Eleazar Calderon  [employer] Phone: [phone]  Relation: Child    Past Surgical History:  Past Surgical History:   Procedure Laterality Date    CARPAL TUNNEL RELEASE      COSMETIC SURGERY         Immunization History: There is no immunization history on file for this patient.     Active Problems:  Patient Active Problem List   Diagnosis Code    Dyspnea R06.00    Nonrheumatic aortic valve stenosis I35.0    Nonrheumatic mitral valve stenosis I34.2    Severe aortic valve stenosis I35.0    Acute on chronic diastolic heart failure (HCC) B57.93    Acute diastolic CHF (congestive heart failure), NYHA class 4 ([facility]) I50.31       Isolation/Infection:   Isolation            No Isolation          Patient Infection Status       Infection Onset Added Last Indicated Last Indicated By Review Planned Expiration Resolved Resolved By    None active    Resolved    COVID-19 (Rule Out) 22 COVID-19, Rapid (Ordered)   22 Rule-Out Test Resulted            Nurse Assessment:  Last Vital Signs: /75   Pulse 66   Temp 97.9 °F (36.6 °C) (Oral)   Resp 18   Ht 5' 5\" (1.651 m)   Wt 92 lb (41.7 kg)   SpO2 97%   BMI 15.31 kg/m²     Last documented pain score (0-10 scale):    Last Weight:   Wt Readings from Last 1 Encounters:   22 92 lb (41.7 kg)     Mental Status:  {IP PT MENTAL STATUS:55140}    IV Access:  { ROWAN IV ACCESS:304352760}    Nursing Mobility/ADLs:  Walking   {[facility] DME QHCF:901001408}  Transfer  {P DME NXWT:210717560}  Bathing  {CHP DME YDAJ:797396205}  Dressing  {CHP DME DRGX:524717465}  Toileting  {CHP DME WUBM:082290368}  Feeding  {[facility] DME WWHE:331999838}  Med Admin  {[facility] DME TYOE:836297184}  Med Delivery   { ROWAN MED Delivery:523370460}    Wound Care Documentation and Therapy:        Elimination:  Continence: Bowel: {YES / WV:20845}  Bladder: {YES / QD:73102}  Urinary Catheter: {Urinary Catheter:331237007}   Colostomy/Ileostomy/Ileal Conduit: {YES / HA:90170}       Date of Last BM: ***    Intake/Output Summary (Last 24 hours) at 2022 1448  Last data filed at 2022 1236  Gross per 24 hour   Intake 350 ml   Output --   Net 350 ml     No intake/output data recorded.     Safety Concerns:     [employer] Safety Concerns:081215893}    Impairments/Disabilities:      [employer] Impairments/Disabilities:091169836}    Nutrition Therapy:  Current Nutrition Therapy:   [employer] Diet List:337288799}    Routes of Feeding: {[facility] DME Other Feedings:058171092}  Liquids: {Slp liquid thickness:07596}  Daily Fluid Restriction: {[facility] DME Yes amt example:720822947}  Last Modified Barium Swallow with Video (Video Swallowing Test): {Done Not Done ELXF:213718915}    Treatments at the Time of Hospital Discharge:   Respiratory Treatments: ***  Oxygen Therapy:  {Therapy; copd oxygen:32052}  Ventilator:    {[facility] Vent EWZA:064915291}    Rehab Therapies: {THERAPEUTIC INTERVENTION:0878847045}  Weight Bearing Status/Restrictions: [address] [employer] Weight Bearin}  Other Medical Equipment (for information only, NOT a DME order):  {EQUIPMENT:027753463}  Other Treatments: ***    Patient's personal belongings (please select all that are sent with patient):  {CHP DME Belongings:192527634}    RN SIGNATURE:  {Esignature:173077255}    CASE MANAGEMENT/SOCIAL WORK SECTION    Inpatient Status Date: ***    Readmission Risk Assessment Score:  Readmission Risk              Risk of Unplanned Readmission:  10           Discharging to Facility/ Agency   Name:   Address:  Phone:  Fax:    Dialysis Facility (if applicable)   Name:  Address:  Dialysis Schedule:  Phone:  Fax:    / signature: {Esignature:001649390}    PHYSICIAN SECTION    Prognosis: {Prognosis:7585243591}    Condition at Discharge: [address] Patient Condition:966769633}    Rehab Potential (if transferring to Rehab): {Prognosis:8155002410}    Recommended Labs or Other Treatments After Discharge: ***    Physician Certification: I certify the above information and transfer of Monserrat Gonzalez  is necessary for the continuing treatment of the diagnosis listed and that she requires {Admit to Appropriate Level of Care:82625} for {GREATER/LESS:900623825} 30 days.      Update Admission H&P: {CHP DME Changes in RKEXV:521532854}    PHYSICIAN SIGNATURE:  {Esignature:810283008} Yes

## 2024-02-14 NOTE — H&P
History & Physical        Reason for admission: Severe aortic stenosis    History Obtained From:  patient    HISTORY OF PRESENT ILLNESS:    The patient is a 80 y.o. female who presents to ER with increased dyspnea. She has a history of HTN, HLD, hypothyroidism, severe aortic stenosis. Patient reports since last Thursday she has had increased dyspnea on exertion as well as mild increase in lower extremity edema. She denies fever, chills, recent illness, cough, wheezing, chest pain, abdominal pain, GI or  issues. Breathing progressively worsened so patient came to the ER. In the ER, CXR shows a left basilar opacity similar to prior exam suggesting scarring or atelectasis, otherwise no acute cardiopulmonary process. proBNP is elevated at 11,691. Troponin slightly elevated at 0.013. She is anemic with hemoglobin of 9.6.  UA is negative. Patient was started on IV diuresis. Patient was admitted and cardiology was consulted. Cardiology recommends transfer to [facility] for aortic stenosis intervention. The patient follows with Dr. Jolinda Landau at [facility]. [place] currently has no beds available.   Patient admitted, will be diuresed, pending admission to [facility].    Past Medical History:        Diagnosis Date    Arthritis     Hypertension        Past Surgical History:        Procedure Laterality Date    CARPAL TUNNEL RELEASE      COSMETIC SURGERY         Medications Prior to Admission:    Medications Prior to Admission: Ascorbic Acid (VITAMIN C PO), Take 1 tablet by mouth daily  Multiple Vitamin (MULTIVITAMIN ADULT PO), Take 1 tablet by mouth daily  diphenhydrAMINE-APAP, sleep, (TYLENOL PM EXTRA STRENGTH PO), Take 2 tablets by mouth nightly  meloxicam (MOBIC) 15 MG tablet, Take 15 mg by mouth daily  metoprolol tartrate (LOPRESSOR) 50 MG tablet, Take 1 tablet by mouth 2 times daily  aspirin 81 MG chewable tablet, Take 1 tablet by mouth daily  furosemide (LASIX) 20 MG tablet, Take 1 tablet by Transition of Care: Plan for discharge home with family and HH. [facility] has accepted patient. Patient owns RW. Patient's grandson lives with her, and the daughter lives down the street. Physical address is [address] [place] [address] Brianna Martinez. Family will transport patient home     [facility] denied. CM sent additional HH referrals.    [facility]-accepted. SOC week of 2/19. There are no other agencies that can accept.        02/14/24 1218   Condition of Participation: Discharge Planning   The Plan for Transition of Care is related to the following treatment goals: home health   The Patient and/or Patient Representative was provided with a Choice of Provider? Patient   The Patient and/Or Patient Representative agree with the Discharge Plan? Yes   Freedom of Choice list was provided with basic dialogue that supports the patient's individualized plan of care/goals, treatment preferences, and shares the quality data associated with the providers?  Yes      mouth daily  levothyroxine (SYNTHROID) 100 MCG tablet, Take 1 tablet by mouth daily  atorvastatin (LIPITOR) 20 MG tablet, Take 20 mg by mouth daily     Allergies:  Ceclor [cefaclor]    Social History:   TOBACCO:   reports that she has never smoked. She uses smokeless tobacco.  ETOH:   reports no history of alcohol use. Family History:   History reviewed. No pertinent family history. REVIEW OF SYSTEMS:  ROS as noted in the HPI    PHYSICAL EXAM:  BP (!) 148/79   Pulse 68   Temp 98.4 °F (36.9 °C) (Oral)   Resp 16   Ht 5' 5\" (1.651 m)   Wt 92 lb (41.7 kg)   SpO2 94%   BMI 15.31 kg/m²   General appearance: alert, appears stated age, cooperative and no distress  Head: Normocephalic, without obvious abnormality, atraumatic  Eyes: conjunctivae/corneas clear.    Ears: normal external  ears  Neck: no adenopathy, supple, symmetrical, trachea midline   Lungs: clear to auscultation bilaterally  Heart: S1, S2 normal, audible murmur  Abdomen:soft, non-tender; non-distended normal bowel sounds   Extremities:Pedal edema Trace   Skin: No rashes or lesions  Neurologic: Alert and oriented X 3, generalized weakness   mental status: Alert, oriented, thought content appropriate  Cranial nerves:II-XII Grossly intact  No new focal deficits    DATA:  EKG:    Sinus rhythm with premature atrial complexes   Minimal voltage criteria for LVH, may be normal variant   Borderline ECG   When compared with ECG of 28-MAY-2022 10:29,   No significant change was found     Imaging:  CXR, 6/27/2022  Left basilar opacity similar to the previous exam suggesting scarring or   atelectasis   Otherwise, no acute cardiopulmonary process     CBC with Differential:    Lab Results   Component Value Date    WBC 7.1 06/28/2022    RBC 3.66 06/28/2022    HGB 9.8 06/28/2022    HCT 32.3 06/28/2022     06/28/2022    MCV 88.3 06/28/2022    SEGSPCT 66.4 06/28/2022    LYMPHOPCT 15.4 06/28/2022    DIFFTYPE AUTOMATED DIFFERENTIAL 06/28/2022     BMP:    Lab Results   Component Value Date     06/28/2022    K 4.3 06/28/2022     06/28/2022    CO2 30 06/28/2022    BUN 33 06/28/2022    CREATININE 0.8 06/28/2022    CALCIUM 9.2 06/28/2022    GFRAA >60 06/28/2022    LABGLOM >60 06/28/2022    GLUCOSE 85 06/28/2022     PT/INR:  No results found for: PTINR    ASSESSMENT / PLAN:     Assessment:  Acute hypoxic respiratory failure: 2/2 aortic stenosis. Continue supplemental O2 and treat below. Will need home O2 eval before going home. Severe aortic stenosis: Follows at [facility] with Dr. Gilford Mike. Pending transfer to [facility] for intervention once bed is available. Continue to diurese. HTN/HLD: CPM  Hypothyroidism: On Synthroid  Remote history of left-sided rib fractures    Plan:  Continue supplemental O2, continue diuresis. Transfer to [facility] once bed becomes available. Discussed with cardiology. May need home O2 eval before going home  Continue home medications. Monitor labs and patient condition. Electronically signed by Samantha Navarro PA-C on 6/28/2022 at 8:09 AM  I have independently evaluated and examined this patient today. I have reviewed radiologic and biochemical tests on this patient. Management Plan is developed mutually with NEHEMIAH Ferguson. I have reviewed above note and agree with assessment and plan. Discussed with family in detail. Monitor condition closely.

## 2024-03-11 ENCOUNTER — APPOINTMENT (OUTPATIENT)
Dept: GENERAL RADIOLOGY | Age: 84
DRG: 291 | End: 2024-03-11
Payer: MEDICARE

## 2024-03-11 ENCOUNTER — HOSPITAL ENCOUNTER (INPATIENT)
Age: 84
LOS: 2 days | Discharge: HOME | DRG: 291 | End: 2024-03-13
Attending: INTERNAL MEDICINE | Admitting: INTERNAL MEDICINE
Payer: MEDICARE

## 2024-03-11 DIAGNOSIS — R79.89 ELEVATED TROPONIN: ICD-10-CM

## 2024-03-11 DIAGNOSIS — I50.23 ACUTE ON CHRONIC SYSTOLIC CONGESTIVE HEART FAILURE (HCC): ICD-10-CM

## 2024-03-11 DIAGNOSIS — N17.9 ACUTE KIDNEY INJURY (HCC): ICD-10-CM

## 2024-03-11 DIAGNOSIS — I50.9 DECOMPENSATED HEART FAILURE (HCC): ICD-10-CM

## 2024-03-11 DIAGNOSIS — D64.9 ACUTE ANEMIA: Primary | ICD-10-CM

## 2024-03-11 LAB
ALBUMIN SERPL-MCNC: 3.8 GM/DL (ref 3.4–5)
ALP BLD-CCNC: 110 IU/L (ref 40–128)
ALT SERPL-CCNC: 29 U/L (ref 10–40)
ANION GAP SERPL CALCULATED.3IONS-SCNC: 14 MMOL/L (ref 7–16)
AST SERPL-CCNC: 38 IU/L (ref 15–37)
BILIRUB SERPL-MCNC: 0.6 MG/DL (ref 0–1)
BUN SERPL-MCNC: 17 MG/DL (ref 6–23)
CALCIUM SERPL-MCNC: 9.2 MG/DL (ref 8.3–10.6)
CHLORIDE BLD-SCNC: 103 MMOL/L (ref 99–110)
CO2: 19 MMOL/L (ref 21–32)
CREAT SERPL-MCNC: 1.4 MG/DL (ref 0.6–1.1)
GFR SERPL CREATININE-BSD FRML MDRD: 38 ML/MIN/1.73M2
GLUCOSE SERPL-MCNC: 134 MG/DL (ref 70–99)
HCT VFR BLD CALC: 22.8 % (ref 37–47)
HCT VFR BLD CALC: 25.7 % (ref 37–47)
HEMOGLOBIN: 6.8 GM/DL (ref 12.5–16)
HEMOGLOBIN: 7.6 GM/DL (ref 12.5–16)
INFLUENZA A ANTIGEN: NOT DETECTED
INFLUENZA B ANTIGEN: NOT DETECTED
IRON: 22 UG/DL (ref 37–145)
MCH RBC QN AUTO: 29.3 PG (ref 27–31)
MCHC RBC AUTO-ENTMCNC: 29.8 % (ref 32–36)
MCV RBC AUTO: 98.3 FL (ref 78–100)
PCT TRANSFERRIN: 11 % (ref 10–44)
PDW BLD-RTO: 12.4 % (ref 11.7–14.9)
PLATELET # BLD: 326 K/CU MM (ref 140–440)
PMV BLD AUTO: 10.6 FL (ref 7.5–11.1)
POTASSIUM SERPL-SCNC: 4.3 MMOL/L (ref 3.5–5.1)
PRO-BNP: ABNORMAL PG/ML
RBC # BLD: 2.32 M/CU MM (ref 4.2–5.4)
SARS-COV-2 RDRP RESP QL NAA+PROBE: NOT DETECTED
SODIUM BLD-SCNC: 136 MMOL/L (ref 135–145)
SOURCE: NORMAL
TOTAL IRON BINDING CAPACITY: 208 UG/DL (ref 250–450)
TOTAL PROTEIN: 7.6 GM/DL (ref 6.4–8.2)
TROPONIN, HIGH SENSITIVITY: 86 NG/L (ref 0–14)
TROPONIN, HIGH SENSITIVITY: 93 NG/L (ref 0–14)
UNSATURATED IRON BINDING CAPACITY: 186 UG/DL (ref 110–370)
WBC # BLD: 11.9 K/CU MM (ref 4–10.5)

## 2024-03-11 PROCEDURE — 2580000003 HC RX 258: Performed by: INTERNAL MEDICINE

## 2024-03-11 PROCEDURE — 94761 N-INVAS EAR/PLS OXIMETRY MLT: CPT

## 2024-03-11 PROCEDURE — 36415 COLL VENOUS BLD VENIPUNCTURE: CPT

## 2024-03-11 PROCEDURE — 86850 RBC ANTIBODY SCREEN: CPT

## 2024-03-11 PROCEDURE — 71045 X-RAY EXAM CHEST 1 VIEW: CPT

## 2024-03-11 PROCEDURE — 85018 HEMOGLOBIN: CPT

## 2024-03-11 PROCEDURE — 84484 ASSAY OF TROPONIN QUANT: CPT

## 2024-03-11 PROCEDURE — 82272 OCCULT BLD FECES 1-3 TESTS: CPT

## 2024-03-11 PROCEDURE — 93005 ELECTROCARDIOGRAM TRACING: CPT | Performed by: PHYSICIAN ASSISTANT

## 2024-03-11 PROCEDURE — A4216 STERILE WATER/SALINE, 10 ML: HCPCS

## 2024-03-11 PROCEDURE — 85014 HEMATOCRIT: CPT

## 2024-03-11 PROCEDURE — 99285 EMERGENCY DEPT VISIT HI MDM: CPT

## 2024-03-11 PROCEDURE — 80053 COMPREHEN METABOLIC PANEL: CPT

## 2024-03-11 PROCEDURE — 86901 BLOOD TYPING SEROLOGIC RH(D): CPT

## 2024-03-11 PROCEDURE — 86900 BLOOD TYPING SEROLOGIC ABO: CPT

## 2024-03-11 PROCEDURE — 6370000000 HC RX 637 (ALT 250 FOR IP): Performed by: INTERNAL MEDICINE

## 2024-03-11 PROCEDURE — C9113 INJ PANTOPRAZOLE SODIUM, VIA: HCPCS | Performed by: INTERNAL MEDICINE

## 2024-03-11 PROCEDURE — 87502 INFLUENZA DNA AMP PROBE: CPT

## 2024-03-11 PROCEDURE — 85027 COMPLETE CBC AUTOMATED: CPT

## 2024-03-11 PROCEDURE — 87507 IADNA-DNA/RNA PROBE TQ 12-25: CPT

## 2024-03-11 PROCEDURE — 83880 ASSAY OF NATRIURETIC PEPTIDE: CPT

## 2024-03-11 PROCEDURE — 2580000003 HC RX 258

## 2024-03-11 PROCEDURE — 36430 TRANSFUSION BLD/BLD COMPNT: CPT

## 2024-03-11 PROCEDURE — 6360000002 HC RX W HCPCS: Performed by: INTERNAL MEDICINE

## 2024-03-11 PROCEDURE — P9016 RBC LEUKOCYTES REDUCED: HCPCS

## 2024-03-11 PROCEDURE — 83550 IRON BINDING TEST: CPT

## 2024-03-11 PROCEDURE — 1200000000 HC SEMI PRIVATE

## 2024-03-11 PROCEDURE — 83540 ASSAY OF IRON: CPT

## 2024-03-11 PROCEDURE — 2700000000 HC OXYGEN THERAPY PER DAY

## 2024-03-11 PROCEDURE — 87635 SARS-COV-2 COVID-19 AMP PRB: CPT

## 2024-03-11 PROCEDURE — 86922 COMPATIBILITY TEST ANTIGLOB: CPT

## 2024-03-11 RX ORDER — SODIUM CHLORIDE 9 MG/ML
INJECTION, SOLUTION INTRAVENOUS PRN
Status: DISCONTINUED | OUTPATIENT
Start: 2024-03-11 | End: 2024-03-13 | Stop reason: HOSPADM

## 2024-03-11 RX ORDER — ONDANSETRON 4 MG/1
4 TABLET, ORALLY DISINTEGRATING ORAL EVERY 8 HOURS PRN
Status: DISCONTINUED | OUTPATIENT
Start: 2024-03-11 | End: 2024-03-11

## 2024-03-11 RX ORDER — POLYETHYLENE GLYCOL 3350 17 G/17G
17 POWDER, FOR SOLUTION ORAL DAILY PRN
Status: DISCONTINUED | OUTPATIENT
Start: 2024-03-11 | End: 2024-03-13 | Stop reason: HOSPADM

## 2024-03-11 RX ORDER — LEVOTHYROXINE SODIUM 88 UG/1
88 TABLET ORAL DAILY
COMMUNITY
Start: 2024-02-03

## 2024-03-11 RX ORDER — ACETAMINOPHEN 325 MG/1
650 TABLET ORAL EVERY 6 HOURS PRN
Status: DISCONTINUED | OUTPATIENT
Start: 2024-03-11 | End: 2024-03-13 | Stop reason: HOSPADM

## 2024-03-11 RX ORDER — FUROSEMIDE 10 MG/ML
20 INJECTION INTRAMUSCULAR; INTRAVENOUS ONCE
Status: DISCONTINUED | OUTPATIENT
Start: 2024-03-11 | End: 2024-03-11

## 2024-03-11 RX ORDER — PANTOPRAZOLE SODIUM 40 MG/10ML
40 INJECTION, POWDER, LYOPHILIZED, FOR SOLUTION INTRAVENOUS 2 TIMES DAILY
Status: DISCONTINUED | OUTPATIENT
Start: 2024-03-11 | End: 2024-03-13 | Stop reason: HOSPADM

## 2024-03-11 RX ORDER — PROCHLORPERAZINE EDISYLATE 5 MG/ML
10 INJECTION INTRAMUSCULAR; INTRAVENOUS EVERY 6 HOURS PRN
Status: DISCONTINUED | OUTPATIENT
Start: 2024-03-11 | End: 2024-03-13 | Stop reason: HOSPADM

## 2024-03-11 RX ORDER — ONDANSETRON 2 MG/ML
4 INJECTION INTRAMUSCULAR; INTRAVENOUS EVERY 6 HOURS PRN
Status: DISCONTINUED | OUTPATIENT
Start: 2024-03-11 | End: 2024-03-11

## 2024-03-11 RX ORDER — SODIUM CHLORIDE 0.9 % (FLUSH) 0.9 %
5-40 SYRINGE (ML) INJECTION PRN
Status: DISCONTINUED | OUTPATIENT
Start: 2024-03-11 | End: 2024-03-13 | Stop reason: HOSPADM

## 2024-03-11 RX ORDER — SODIUM CHLORIDE 0.9 % (FLUSH) 0.9 %
5-40 SYRINGE (ML) INJECTION EVERY 12 HOURS SCHEDULED
Status: DISCONTINUED | OUTPATIENT
Start: 2024-03-11 | End: 2024-03-13 | Stop reason: HOSPADM

## 2024-03-11 RX ORDER — ACETAMINOPHEN 650 MG/1
650 SUPPOSITORY RECTAL EVERY 6 HOURS PRN
Status: DISCONTINUED | OUTPATIENT
Start: 2024-03-11 | End: 2024-03-13 | Stop reason: HOSPADM

## 2024-03-11 RX ORDER — ATORVASTATIN CALCIUM 10 MG/1
20 TABLET, FILM COATED ORAL NIGHTLY
Status: DISCONTINUED | OUTPATIENT
Start: 2024-03-11 | End: 2024-03-13 | Stop reason: HOSPADM

## 2024-03-11 RX ORDER — PANTOPRAZOLE SODIUM 40 MG/1
40 TABLET, DELAYED RELEASE ORAL
Status: DISCONTINUED | OUTPATIENT
Start: 2024-03-12 | End: 2024-03-11

## 2024-03-11 RX ORDER — LEVOTHYROXINE SODIUM 0.1 MG/1
100 TABLET ORAL DAILY
Status: DISCONTINUED | OUTPATIENT
Start: 2024-03-12 | End: 2024-03-11

## 2024-03-11 RX ORDER — LEVOTHYROXINE SODIUM 88 UG/1
88 TABLET ORAL DAILY
Status: DISCONTINUED | OUTPATIENT
Start: 2024-03-12 | End: 2024-03-13 | Stop reason: HOSPADM

## 2024-03-11 RX ORDER — FUROSEMIDE 10 MG/ML
40 INJECTION INTRAMUSCULAR; INTRAVENOUS 2 TIMES DAILY
Status: CANCELLED | OUTPATIENT
Start: 2024-03-11

## 2024-03-11 RX ORDER — SODIUM CHLORIDE 9 MG/ML
INJECTION, SOLUTION INTRAMUSCULAR; INTRAVENOUS; SUBCUTANEOUS
Status: COMPLETED
Start: 2024-03-11 | End: 2024-03-11

## 2024-03-11 RX ORDER — FUROSEMIDE 10 MG/ML
20 INJECTION INTRAMUSCULAR; INTRAVENOUS 2 TIMES DAILY
Status: DISCONTINUED | OUTPATIENT
Start: 2024-03-11 | End: 2024-03-13 | Stop reason: HOSPADM

## 2024-03-11 RX ADMIN — PANTOPRAZOLE SODIUM 40 MG: 40 INJECTION, POWDER, FOR SOLUTION INTRAVENOUS at 20:34

## 2024-03-11 RX ADMIN — ATORVASTATIN CALCIUM 20 MG: 10 TABLET, FILM COATED ORAL at 20:34

## 2024-03-11 RX ADMIN — METOPROLOL TARTRATE 25 MG: 25 TABLET, FILM COATED ORAL at 20:34

## 2024-03-11 RX ADMIN — FUROSEMIDE 20 MG: 10 INJECTION, SOLUTION INTRAMUSCULAR; INTRAVENOUS at 18:21

## 2024-03-11 RX ADMIN — SODIUM CHLORIDE 10 ML: 9 INJECTION INTRAMUSCULAR; INTRAVENOUS; SUBCUTANEOUS at 20:35

## 2024-03-11 RX ADMIN — SODIUM CHLORIDE, PRESERVATIVE FREE 10 ML: 5 INJECTION INTRAVENOUS at 20:35

## 2024-03-11 RX ADMIN — FUROSEMIDE 20 MG: 10 INJECTION, SOLUTION INTRAMUSCULAR; INTRAVENOUS at 12:55

## 2024-03-11 RX ADMIN — SODIUM CHLORIDE, PRESERVATIVE FREE 10 ML: 5 INJECTION INTRAVENOUS at 14:41

## 2024-03-11 RX ADMIN — SODIUM CHLORIDE, PRESERVATIVE FREE 10 ML: 5 INJECTION INTRAVENOUS at 13:00

## 2024-03-11 RX ADMIN — PANTOPRAZOLE SODIUM 40 MG: 40 INJECTION, POWDER, FOR SOLUTION INTRAVENOUS at 14:40

## 2024-03-11 ASSESSMENT — PAIN DESCRIPTION - LOCATION: LOCATION: NECK

## 2024-03-11 ASSESSMENT — PAIN - FUNCTIONAL ASSESSMENT: PAIN_FUNCTIONAL_ASSESSMENT: 0-10

## 2024-03-11 ASSESSMENT — PAIN SCALES - GENERAL: PAINLEVEL_OUTOF10: 4

## 2024-03-11 NOTE — ED NOTES
Medication History  [facility]    Patient Name: Luci Coleman 1940     Medication history has been completed by: Kaylah Roberson CPhT    Source(s) of information: patient's family and insurance claims     Primary Care Physician: Deon Kessler     Pharmacy: [facility]    Allergies as of 03/11/2024 - Fully Reviewed 03/11/2024   Allergen Reaction Noted    Ceclor [cefaclor] Hives 11/25/2018        Prior to Admission medications    Medication Sig Start Date End Date Taking? Authorizing Provider   levothyroxine (SYNTHROID) 88 MCG tablet Take 1 tablet by mouth daily 2/3/24  Yes Lexi Ferrer MD   potassium chloride (KLOR-CON M) 10 MEQ extended release tablet Take 1 tablet by mouth 2 times daily 2/10/23   Shad Pedersen MD   metoprolol tartrate (LOPRESSOR) 25 MG tablet Take 1 tablet by mouth 2 times daily 2/10/23   Shad Pedersen MD   ELIQUIS 2.5 MG TABS tablet Take 1 tablet by mouth 2 times daily 2/5/23   Lexi Ferrer MD   acetaminophen (TYLENOL) 500 MG tablet Take 2 tablets by mouth every morning    Lexi Ferrer MD   Ascorbic Acid (VITAMIN C PO) Take 1 tablet by mouth daily    Lexi Ferrer MD   Multiple Vitamin (MULTIVITAMIN ADULT PO) Take 1 tablet by mouth daily    Lexi Ferrer MD   atorvastatin (LIPITOR) 20 MG tablet Take 1 tablet by mouth daily    Lexi Ferrer MD     Medications added or changed (ex. new medication, dosage change, interval change, formulation change):  Levothyroxine dosage change from 100 mcg to 88 mcg (100 mcg ordered)    Medications removed from list (include reason, ex. noncompliance, medication cost, therapy complete etc.):   Losartan discontinued per PCP  Torsemide discontinued per PCP    Medications requiring reconciliation with provider:    Levothyroxine dosage change from 100 mcg to 88 mcg (100 mcg ordered)    Comments:  Patient is poor historian for medications.  Medication list reviewed

## 2024-03-11 NOTE — CONSULTS
[facility] Nurse  Consult Note       Luci Coleman  AGE: 83 y.o.   GENDER: female  : 1940  TODAY'S DATE:  3/11/2024    Subjective:     Reason for Evaluation and Assessment: skin assessment      Luci Coleman is a 83 y.o. female referred by:   [] Physician  [] Nursing  [] Other:     Wound Identification:  Wound Type: pressure  Contributing Factors: chronic pressure, decreased mobility, and shear force        PAST MEDICAL HISTORY        Diagnosis Date    Aortic stenosis     Arthritis     Hypertension        PAST SURGICAL HISTORY    Past Surgical History:   Procedure Laterality Date    CARPAL TUNNEL RELEASE      COSMETIC SURGERY      UPPER GASTROINTESTINAL ENDOSCOPY N/A 2023    EGD BIOPSY performed by Jose Hodges MD at [facility] ENDOSCOPY       FAMILY HISTORY    No family history on file.    SOCIAL HISTORY    Social History     Tobacco Use    Smoking status: Never    Smokeless tobacco: Current   Substance Use Topics    Alcohol use: No    Drug use: No       ALLERGIES    Allergies   Allergen Reactions    Ceclor [Cefaclor] Hives       MEDICATIONS    No current facility-administered medications on file prior to encounter.     Current Outpatient Medications on File Prior to Encounter   Medication Sig Dispense Refill    levothyroxine (SYNTHROID) 88 MCG tablet Take 1 tablet by mouth daily      potassium chloride (KLOR-CON M) 10 MEQ extended release tablet Take 1 tablet by mouth 2 times daily 60 tablet 3    metoprolol tartrate (LOPRESSOR) 25 MG tablet Take 1 tablet by mouth 2 times daily 60 tablet 3    ELIQUIS 2.5 MG TABS tablet Take 1 tablet by mouth 2 times daily      acetaminophen (TYLENOL) 500 MG tablet Take 2 tablets by mouth every morning      Ascorbic Acid (VITAMIN C PO) Take 1 tablet by mouth daily      Multiple Vitamin (MULTIVITAMIN ADULT PO) Take 1 tablet by mouth daily      atorvastatin (LIPITOR) 20 MG tablet Take 1 tablet by mouth daily           Objective:      BP (!) 152/76   Pulse 78

## 2024-03-11 NOTE — ED NOTES
Started PRBC at this time, verified with lab and second RN, pt tolerating well, family at the bedside with primary RN.

## 2024-03-11 NOTE — ED NOTES
ED TO INPATIENT SBAR HANDOFF    Patient Name: Luci Coleman   :  1940  83 y.o.   Preferred Name  Luci  Family/Caregiver Present yes   Restraints no   C-SSRS: Risk of Suicide: No Risk  Sitter no   Sepsis Risk Score Sepsis Risk Score: 1.12      Situation  Chief Complaint   Patient presents with    Shortness of Breath     Edema to BLE     Brief Description of Patient's Condition: SOB, fall.  Mental Status: alert  Arrived from: home    Imaging:   XR CHEST PORTABLE   Final Result   1. Cardiomegaly with pulmonary interstitial edema      Electronically signed by Gianfranco Martinez MD        Abnormal labs:   Abnormal Labs Reviewed   CBC - Abnormal; Notable for the following components:       Result Value    WBC 11.9 (*)     RBC 2.32 (*)     Hemoglobin 6.8 (*)     Hematocrit 22.8 (*)     MCHC 29.8 (*)     All other components within normal limits   COMPREHENSIVE METABOLIC PANEL - Abnormal; Notable for the following components:    CO2 19 (*)     Glucose 134 (*)     Creatinine 1.4 (*)     Est, Glom Filt Rate 38 (*)     AST 38 (*)     All other components within normal limits   TROPONIN - Abnormal; Notable for the following components:    Troponin, High Sensitivity 93 (*)     All other components within normal limits   TROPONIN - Abnormal; Notable for the following components:    Troponin, High Sensitivity 86 (*)     All other components within normal limits   BRAIN NATRIURETIC PEPTIDE - Abnormal; Notable for the following components:    Pro-BNP 31,177 (*)     All other components within normal limits       Background  History:   Past Medical History:   Diagnosis Date    Aortic stenosis     Arthritis     Hypertension        Assessment    Vitals: MEWS Score: 3  Level of Consciousness: Alert (0)   Vitals:    24 0902 24 0932 24 1001 24 1031   BP: (!) 142/84 (!) 147/75 (!) 148/85 (!) 147/74   Pulse: 90 84 89 77   Resp: 27 (!) 32 (!) 44 29   Temp:    98.8 °F (37.1 °C)   SpO2: 92% 96% 94% 98%   Weight:

## 2024-03-11 NOTE — CONSENT
Informed Consent for Blood Component Transfusion Note    I have discussed with the patient the rationale for blood component transfusion; its benefits in treating or preventing fatigue, organ damage, or death; and its risk which includes mild transfusion reactions, rare risk of blood borne infection, or more serious but rare reactions. I have discussed the alternatives to transfusion, including the risk and consequences of not receiving transfusion. The patient had an opportunity to ask questions and had agreed to proceed with transfusion of blood components.    Electronically signed by Jose Galeas PA-C on 3/11/24 at 10:00 AM EDT

## 2024-03-11 NOTE — CONSULTS
Osteoporosis   IMPRESSION:  1. Cardiomegaly with pulmonary interstitial edema    Plan  Monitor tele  Monitor electrolytes and CBC  Diruese  Continue medical tx BB , lasix, statin,       ATTENDING  Presented to ED with dizziness and fall.   Noted to be in decompensated CHF.  States her symptoms have been developing over a month and she has been having dark stools.  No CP, but has some SOB.  She is s/p TAVR.  ECG shows SR with PACs, LBBB.  Echo last year showed mod MR with preserved EF.  HST 86.  ProBNP 31K. Cr 1.4. Hgb 7.6.    Transfuse. Lasix.  Other CHF meds.   Consider GI eval. Will f/u.    Electronically signed by Swati Jarquin MD on 3/11/2024 at 6:58 PM

## 2024-03-11 NOTE — PROGRESS NOTES
4 Eyes Skin Assessment     NAME:  Luci Coleman  YOB: 1940  MEDICAL RECORD NUMBER:  [id number]    The patient is being assessed for  Admission    I agree that at least one RN has performed a thorough Head to Toe Skin Assessment on the patient. ALL assessment sites listed below have been assessed.      Areas assessed by both nurses:    Head, Face, Ears, Shoulders, Back, Chest, Arms, Elbows, Hands, Sacrum. Buttock, Coccyx, Ischium, and Legs. Feet and Heels        Does the Patient have a Wound? Yes wound(s) were present on assessment. LDA wound assessment was Initiated and completed by RN       Neno Prevention initiated by RN: Yes  Wound Care Orders initiated by RN: yes    Pressure Injury (Stage 3,4, Unstageable, DTI, NWPT, and Complex wounds) if present, place Wound referral order by RN under : Yes    New Ostomies, if present place, Ostomy referral order under : No     Nurse 1 eSignature: Electronically signed by Danielle Aiken RN on 3/11/24 at 12:36 PM EDT    **SHARE this note so that the co-signing nurse can place an eSignature**    Nurse 2 eSignature: Electronically signed by Lili Villegas LPN on 3/11/24 at 3:20 PM EDT

## 2024-03-11 NOTE — CONSULTS
[facility]           [address] [place] [id number]                              CONSULTATION      PATIENT NAME: YANG HADDAD                : 1940  MED REC NO: [id number]                      ROOM: [id number]  ACCOUNT NO: [id number]                       ADMIT DATE: 2024  PROVIDER: Soila Munson CNP    CARDIOLOGY CONSULT      REASON FOR CONSULTATION:  Shortness of breath with bilateral lower extremity edema.    HISTORY OF PRESENT ILLNESS:  She came into the ER earlier this morning with some shortness of breath and some dizziness and she had fallen yesterday, hitting her head on the wall.  I do not see a CT of the head ordered by hospital as she does have a headache this morning.  She does endorse darker stools recently as well and is receiving 1 unit of blood this morning.  No bilateral lower extremity edema today.  Her son said that they were pretty bad yesterday, but there is no sign of that today.  She denies any chest pain currently, palpitations, current dizziness.  Mild shortness of breath.  Troponin was 86 and 93.  Suspect demand ischemia due to anemia.  Creatinine was 1.4.  The hemoglobin was 6.8, transfused now.  Stool occult was collected.  ProBNP is 31,000, it is up from 1000 a month ago.  12-lead EKG showed normal sinus with a left bundle-branch block and PACs.  She had an echo in November that was 50% with an abnormal septal wall motion, possibly due to a conduction delay.  Has had a wide QRS.  She is status post TAVR and moderate mitral valve regurgitation, moderate-to-severe tricuspid valve.  No pulmonary hypertension to the right ventricle.  The systolic pressure of the right ventricle was 32 mmHg.  Left atrial chamber is mildly enlarged with a left atrial volume of 35 mL that was decreased.  The EF from 2022 that was 61%.  She had a catheterization in 2022.  Left main was patent.  LAD, mild disease.  Circumflex, mild

## 2024-03-11 NOTE — ED TRIAGE NOTES
Pt arrived from home with family, c/o SOB that started yesterday with some BLE edema, pt has hx of COPD, placed on 2 L NC oxygen at this time due to SpO2 88% on room air, pt fallen yesterday at 8 pm and hit her head to a wall, pt on blood thinners, no noted injuries.

## 2024-03-11 NOTE — H&P
V2.0  History and Physical      Name:  Luci Coleman /Age/Sex: 1940  (83 y.o. female)   MRN & CSN:  [id number] & [id number] Encounter Date/Time: 3/11/2024 11:20 AM EDT   Location:  ED15/ED-15 PCP: [facility] Day: 1    Assessment and Plan:   Luci Coleman is a 83 y.o. female who presents with Decompensated heart failure (HCC)    Hospital Problems             Last Modified POA    * (Principal) Decompensated heart failure (HCC) 3/11/2024 Yes       Plan:  Acute Decompensated Heart Failure: CXR with pulmonary edema; was recently taken off her home Torsemide; one time dose ordered while patient is in ED; was and will continue IV BID, telemetry, Cardiology consulted for assistance with diuretic recs on discharge as patient was just taken off home Torsemide  Acute on Chronic Anemia: B/l around 9; Hgb 6.8 in ED; receiving one unit while I was in the ED; stool occult was collected by ED provider per discussion however do not see any results so will re-order; if positive will c/s GI; clears for now in event occult is positive and GI decides on a scope tomorrow; begin PPI for now  Acute Hypoxemic Respiratory Failure:  On 2L NC in setting of above, continue diuresis  Abnormal Troponin: Suspecting demand in setting of anemia, decompensated heart failure; no ischemic EKG changes  BERTIN: Holding ACE, transfuse one unit and repeat BMP in AM  Hx Paroxysmal Atrial Fibrillation: Continue Metoprolol but holding Eliquis in light of anemia and pending stool occult  History of Hypothyroidism: Continue Levothyroxine  Hx of Pulmonary HTN/Severe MR/AS s/p TAVR: Continue diuresis as per above    I had a 10 minute code status discussion with the patient regarding her code status and patient would like to remain a full code.    Disposition:   Current Living situation: home  Expected Disposition: Home  Estimated D/C: 2 days    Diet No diet orders on file   DVT Prophylaxis [] Lovenox, []  Heparin, [x] SCDs, []    Recent Labs     03/11/24  0833      K 4.3      CO2 19*   BUN 17   CREATININE 1.4*   GLUCOSE 134*     Hepatic:   Recent Labs     03/11/24  0833   AST 38*   ALT 29   BILITOT 0.6   ALKPHOS 110     Lipids:   Lab Results   Component Value Date/Time    CHOL 158 02/07/2023 02:02 AM    HDL 55 02/07/2023 02:02 AM    TRIG 110 02/07/2023 02:02 AM     Hemoglobin A1C:   Lab Results   Component Value Date/Time    LABA1C 6.5 05/31/2022 08:18 AM     TSH: No results found for: \"TSH\"  Troponin:   Lab Results   Component Value Date/Time    TROPONINT 0.043 02/07/2023 09:35 AM    TROPONINT 0.043 02/07/2023 02:02 AM    TROPONINT 0.035 02/06/2023 08:47 PM     Lactic Acid: No results for input(s): \"LACTA\" in the last 72 hours.  BNP:   Recent Labs     03/11/24  0833   PROBNP 31,177*     UA:  Lab Results   Component Value Date/Time    NITRU NEGATIVE 06/27/2022 10:51 AM    COLORU YELLOW 06/27/2022 10:51 AM    WBCUA NONE SEEN 06/27/2022 10:51 AM    RBCUA NONE SEEN 06/27/2022 10:51 AM    TRICHOMONAS NONE SEEN 06/27/2022 10:51 AM    BACTERIA NEGATIVE 06/27/2022 10:51 AM    CLARITYU CLEAR 06/27/2022 10:51 AM    SPECGRAV 1.010 06/27/2022 10:51 AM    LEUKOCYTESUR NEGATIVE 06/27/2022 10:51 AM    UROBILINOGEN 0.2 06/27/2022 10:51 AM    BILIRUBINUR NEGATIVE 06/27/2022 10:51 AM    BLOODU NEGATIVE 06/27/2022 10:51 AM    KETUA NEGATIVE 06/27/2022 10:51 AM     Urine Cultures: No results found for: \"LABURIN\"  Blood Cultures: No results found for: \"BC\"  No results found for: \"BLOODCULT2\"  Organism: No results found for: \"ORG\"    Imaging/Diagnostics Last 24 Hours   XR CHEST PORTABLE    Result Date: 3/11/2024  EXAM: PORTABLE AP CHEST X-RAY, 8:39 AM INDICATION: shortness of breath COMPARISON: 2/8/2023 FINDINGS: Trachea is midline. Cardiomegaly Mild aortic tortuosity Healed Mild hazy pulmonary density with bilateral pleural effusions. Peripheral interstitial Kerley B-lines. Osteoporosis     1. Cardiomegaly with pulmonary interstitial edema

## 2024-03-11 NOTE — ED NOTES
Placed pt on 1 L NC oxygen for comfort, noted RR increase at times and SpO2 drop below 90%, educated pt to take slow deep breaths, made PA-C aware, will continue to monitor.

## 2024-03-11 NOTE — ED PROVIDER NOTES
[facility] EMERGENCY DEPARTMENT  EMERGENCY DEPARTMENT ENCOUNTER        Pt Name: Luci Coleman  MRN: [id number]  Birthdate 1940  Date of evaluation: 3/11/2024  Provider: Jose Galeas PA-C  PCP: Deon Kessler. I have evaluated this patient.        CHIEF COMPLAINT      Chief Complaint   Patient presents with    Shortness of Breath     Edema to BLE       HISTORY OF PRESENT ILLNESS:     History from : Patient and family son     Limitations to history : None    Luci Coleman is a 83 y.o. female who presents complaint of shortness of breath.  She mentions this started last night and been constant until today.  She mentions she was trying to avoid coming emergency department is why she came in today.  She does describe it worse when lying down has noticed some swelling in her legs.  Son is at bedside mention that shortness of breath is been more intermittent, he recently had been told to stop torsemide by her primary care provider mentioning issue with the kidneys.  She does feel shortness of breath is worse when she lies flat.  Per nursing, patient has a history of anxiety. Patient denies any lower extremity pain, chest pain, back pain abdominal pain, nausea, vomiting, URI symptoms, cough, hemoptysis, fevers, recent illness,    Nursing Notes were all reviewed and agreed with or any disagreements were addressed in the HPI.    REVIEW OF SYSTEMS :     Review of Systems   All other systems reviewed and are negative.      Pertinent positives and negatives are stated within HPI    PAST HISTORY   has a past medical history of Aortic stenosis, Arthritis, and Hypertension.    Past Surgical History:   Procedure Laterality Date    CARPAL TUNNEL RELEASE      COSMETIC SURGERY      UPPER GASTROINTESTINAL ENDOSCOPY N/A 2/8/2023    EGD BIOPSY performed by Jose Hodges MD at [facility] ENDOSCOPY       No family history on file.    Social History     Tobacco Use    Smoking  her previous she does mention some dark stools that been going on for past several days, Hemoccult today was positive.  She does have a history of normocytic anemia.  May be contributing to her lightheadedness.  She did have some infiltrates on chest x-ray with an elevated troponins, and BNP also concern that her dyspnea may be related to CHF exacerbation.  She appears to have a higher oxygen demand as well.  Does appear to have acute kidney injury and given her acute anemia, respiratory failure with hypoxia, concern for CHF exacerbation I did advise for admission patient was in agreement, I did discuss with hospitalist for admission they did accept patient.      Condition is: moderate stable acute illness     Discussion with Other Profesionals : Admitting Team via [employer] message and accepted patient        Records Reviewed : None    Escalation of care, including admission/OBS considered : Patient to be admitted.   Discussed case with hospitalist   who agreed to admit patient.    Disposition Considerations (tests considered but not done, Shared Decision Making, Pt Expectation of Test or Tx.): none (unless indicated in ED Course, Summary, Reassessment, or MDM        Chronic conditions affecting care:    has a past medical history of Aortic stenosis, Arthritis, and Hypertension.    Social Determinants Significantly Affecting  Health:  Social Determinants of Health : None         I am the Primary Clinician of Record.       Is this patient to be included in the SEP-1 Core Measure due to severe sepsis or septic shock?   No   Exclusion criteria - the patient is NOT to be included for SEP-1 Core Measure due to:  2+ SIRS criteria are not met    PROCEDURES   Unless otherwise noted  none     Procedures    CRITICAL CARE TIME      CRITICAL CARE NOTE:    There was a high probability of clinically significant, life threatening deterioration of the patient requiring my highest level of preparedness to intervene emergently

## 2024-03-12 ENCOUNTER — APPOINTMENT (OUTPATIENT)
Dept: NON INVASIVE DIAGNOSTICS | Age: 84
DRG: 291 | End: 2024-03-12
Attending: INTERNAL MEDICINE
Payer: MEDICARE

## 2024-03-12 ENCOUNTER — APPOINTMENT (OUTPATIENT)
Dept: ULTRASOUND IMAGING | Age: 84
DRG: 291 | End: 2024-03-12
Payer: MEDICARE

## 2024-03-12 PROBLEM — F41.1 GAD (GENERALIZED ANXIETY DISORDER): Status: ACTIVE | Noted: 2024-03-12

## 2024-03-12 LAB
ABO/RH: NORMAL
ANION GAP SERPL CALCULATED.3IONS-SCNC: 11 MMOL/L (ref 7–16)
ANTIBODY SCREEN: NEGATIVE
ASTROVIRUS PCR: NOT DETECTED
BASOPHILS ABSOLUTE: 0 K/CU MM
BASOPHILS ABSOLUTE: 0.1 K/CU MM
BASOPHILS RELATIVE PERCENT: 0.2 % (ref 0–1)
BASOPHILS RELATIVE PERCENT: 0.7 % (ref 0–1)
BILIRUBIN URINE: NEGATIVE MG/DL
BLOOD, URINE: NEGATIVE
BUN SERPL-MCNC: 15 MG/DL (ref 6–23)
C CAYETANENSIS DNA SPEC QL NAA+PROBE: NOT DETECTED
CALCIUM SERPL-MCNC: 8.1 MG/DL (ref 8.3–10.6)
CAMPY SP DNA.DIARRHEA STL QL NAA+PROBE: NOT DETECTED
CHLORIDE BLD-SCNC: 103 MMOL/L (ref 99–110)
CHLORIDE URINE RANDOM: 145 MMOL/L (ref 43–210)
CHOLEST SERPL-MCNC: 136 MG/DL
CLARITY: CLEAR
CO2: 24 MMOL/L (ref 21–32)
COLOR: YELLOW
COMMENT UA: NORMAL
COMPONENT: NORMAL
CREAT SERPL-MCNC: 1.5 MG/DL (ref 0.6–1.1)
CREATININE URINE: 26.8 MG/DL (ref 28–217)
CROSSMATCH RESULT: NORMAL
CRYPTOSP DNA SPEC QL NAA+PROBE: NOT DETECTED
DIFFERENTIAL TYPE: ABNORMAL
DIFFERENTIAL TYPE: ABNORMAL
E COLI DNA SPEC QL NAA+PROBE: NOT DETECTED
E COLI ENTEROAGGREGATIVE PCR: NOT DETECTED
E COLI ENTEROPATHOGENIC PCR: NOT DETECTED
E COLI ENTEROTOXIGENIC PCR: NOT DETECTED
E COLI O157H7 DNA SPEC QL NAA+PROBE: NOT DETECTED
E HISTOLYT DNA SPEC QL NAA+PROBE: NOT DETECTED
EC STX1+STX2 + H7 FLIC SPEC NAA+PROBE: NOT DETECTED
ECHO AV MEAN GRADIENT: 9 MMHG
ECHO AV MEAN VELOCITY: 1.5 M/S
ECHO AV PEAK GRADIENT: 14 MMHG
ECHO AV PEAK VELOCITY: 1.9 M/S
ECHO AV VELOCITY RATIO: 0.58
ECHO AV VTI: 39.3 CM
ECHO BSA: 1.38 M2
ECHO EST RA PRESSURE: 3 MMHG
ECHO IVC PROX: 2.2 CM
ECHO LA AREA 4C: 20.2 CM2
ECHO LA DIAMETER INDEX: 2.54 CM/M2
ECHO LA DIAMETER: 3.6 CM
ECHO LA MAJOR AXIS: 5.6 CM
ECHO LA VOL MOD A4C: 54 ML (ref 22–52)
ECHO LA VOLUME INDEX MOD A4C: 38 ML/M2 (ref 16–34)
ECHO LV EDV A4C: 50 ML
ECHO LV EDV INDEX A4C: 35 ML/M2
ECHO LV EJECTION FRACTION A4C: 52 %
ECHO LV ESV A4C: 24 ML
ECHO LV ESV INDEX A4C: 17 ML/M2
ECHO LV FRACTIONAL SHORTENING: 21 % (ref 28–44)
ECHO LV INTERNAL DIMENSION DIASTOLE INDEX: 2.96 CM/M2
ECHO LV INTERNAL DIMENSION DIASTOLIC: 4.2 CM (ref 3.9–5.3)
ECHO LV INTERNAL DIMENSION SYSTOLIC INDEX: 2.32 CM/M2
ECHO LV INTERNAL DIMENSION SYSTOLIC: 3.3 CM
ECHO LV IVSD: 1 CM (ref 0.6–0.9)
ECHO LV MASS 2D: 118.7 G (ref 67–162)
ECHO LV MASS INDEX 2D: 83.6 G/M2 (ref 43–95)
ECHO LV POSTERIOR WALL DIASTOLIC: 0.8 CM (ref 0.6–0.9)
ECHO LV RELATIVE WALL THICKNESS RATIO: 0.38
ECHO LVOT AV VTI INDEX: 0.58
ECHO LVOT MEAN GRADIENT: 3 MMHG
ECHO LVOT PEAK GRADIENT: 5 MMHG
ECHO LVOT PEAK VELOCITY: 1.1 M/S
ECHO LVOT VTI: 22.6 CM
ECHO MV A VELOCITY: 1.57 M/S
ECHO MV E VELOCITY: 1.4 M/S
ECHO MV E/A RATIO: 0.89
ECHO MV LVOT VTI INDEX: 2.43
ECHO MV MAX VELOCITY: 1.8 M/S
ECHO MV MEAN GRADIENT: 8 MMHG
ECHO MV MEAN VELOCITY: 1.3 M/S
ECHO MV PEAK GRADIENT: 13 MMHG
ECHO MV REGURGITANT ALIASING (NYQUIST) VELOCITY: 37 CM/S
ECHO MV REGURGITANT PEAK GRADIENT: 169 MMHG
ECHO MV REGURGITANT PEAK VELOCITY: 6.5 M/S
ECHO MV REGURGITANT RADIUS PISA: 0.9 CM
ECHO MV REGURGITANT VTIA: 223 CM
ECHO MV VTI: 55 CM
ECHO RIGHT VENTRICULAR SYSTOLIC PRESSURE (RVSP): 76 MMHG
ECHO TV REGURGITANT MAX VELOCITY: 4.28 M/S
ECHO TV REGURGITANT PEAK GRADIENT: 73 MMHG
EKG ATRIAL RATE: 90 BPM
EKG DIAGNOSIS: NORMAL
EKG P AXIS: 66 DEGREES
EKG P-R INTERVAL: 154 MS
EKG Q-T INTERVAL: 446 MS
EKG QRS DURATION: 132 MS
EKG QTC CALCULATION (BAZETT): 545 MS
EKG R AXIS: -56 DEGREES
EKG T AXIS: 77 DEGREES
EKG VENTRICULAR RATE: 90 BPM
EOSINOPHILS ABSOLUTE: 0.2 K/CU MM
EOSINOPHILS ABSOLUTE: 0.2 K/CU MM
EOSINOPHILS RELATIVE PERCENT: 1.9 % (ref 0–3)
EOSINOPHILS RELATIVE PERCENT: 2.8 % (ref 0–3)
G LAMBLIA DNA SPEC QL NAA+PROBE: NOT DETECTED
GFR SERPL CREATININE-BSD FRML MDRD: 35 ML/MIN/1.73M2
GLUCOSE SERPL-MCNC: 93 MG/DL (ref 70–99)
GLUCOSE, URINE: NEGATIVE MG/DL
HADV DNA SPEC QL NAA+PROBE: NOT DETECTED
HCT VFR BLD CALC: 23.4 % (ref 37–47)
HCT VFR BLD CALC: 24.9 % (ref 37–47)
HDLC SERPL-MCNC: 38 MG/DL
HEMOCCULT STL QL: NEGATIVE
HEMOGLOBIN: 7.2 GM/DL (ref 12.5–16)
HEMOGLOBIN: 7.9 GM/DL (ref 12.5–16)
IMMATURE NEUTROPHIL %: 0.3 % (ref 0–0.43)
IMMATURE NEUTROPHIL %: 0.4 % (ref 0–0.43)
KETONES, URINE: NEGATIVE MG/DL
LACTATE DEHYDROGENASE: 269 IU/L (ref 120–246)
LDLC SERPL CALC-MCNC: 82 MG/DL
LEUKOCYTE ESTERASE, URINE: NEGATIVE
LYMPHOCYTES ABSOLUTE: 0.8 K/CU MM
LYMPHOCYTES ABSOLUTE: 0.9 K/CU MM
LYMPHOCYTES RELATIVE PERCENT: 11.9 % (ref 24–44)
LYMPHOCYTES RELATIVE PERCENT: 8.9 % (ref 24–44)
MAGNESIUM: 1.8 MG/DL (ref 1.8–2.4)
MCH RBC QN AUTO: 29.5 PG (ref 27–31)
MCH RBC QN AUTO: 29.9 PG (ref 27–31)
MCHC RBC AUTO-ENTMCNC: 30.8 % (ref 32–36)
MCHC RBC AUTO-ENTMCNC: 31.7 % (ref 32–36)
MCV RBC AUTO: 94.3 FL (ref 78–100)
MCV RBC AUTO: 95.9 FL (ref 78–100)
MONOCYTES ABSOLUTE: 0.9 K/CU MM
MONOCYTES ABSOLUTE: 1 K/CU MM
MONOCYTES RELATIVE PERCENT: 11.5 % (ref 0–4)
MONOCYTES RELATIVE PERCENT: 12.1 % (ref 0–4)
NITRITE URINE, QUANTITATIVE: NEGATIVE
NOROVIRUS RNA SPEC QL NAA+PROBE: NOT DETECTED
NUCLEATED RBC %: 0 %
NUCLEATED RBC %: 0 %
P SHIGELLOIDES DNA STL QL NAA+PROBE: NOT DETECTED
PDW BLD-RTO: 12.7 % (ref 11.7–14.9)
PDW BLD-RTO: 12.7 % (ref 11.7–14.9)
PH, URINE: 5 (ref 5–8)
PLATELET # BLD: 276 K/CU MM (ref 140–440)
PLATELET # BLD: 299 K/CU MM (ref 140–440)
PMV BLD AUTO: 10.1 FL (ref 7.5–11.1)
PMV BLD AUTO: 10.6 FL (ref 7.5–11.1)
POTASSIUM SERPL-SCNC: 3.8 MMOL/L (ref 3.5–5.1)
POTASSIUM, UR: 17.3 MMOL/L (ref 22–119)
PROT/CREAT RATIO, UR: 0.1
PROTEIN UA: NEGATIVE MG/DL
RBC # BLD: 2.44 M/CU MM (ref 4.2–5.4)
RBC # BLD: 2.64 M/CU MM (ref 4.2–5.4)
RETICULOCYTE COUNT PCT: 1.7 % (ref 0.2–2.2)
RV RNA SPEC QL NAA+PROBE: NOT DETECTED
SALMONELLA DNA SPEC QL NAA+PROBE: NOT DETECTED
SAPOVIRUS PCR: NOT DETECTED
SEGMENTED NEUTROPHILS ABSOLUTE COUNT: 5.4 K/CU MM
SEGMENTED NEUTROPHILS ABSOLUTE COUNT: 6.5 K/CU MM
SEGMENTED NEUTROPHILS RELATIVE PERCENT: 72.8 % (ref 36–66)
SEGMENTED NEUTROPHILS RELATIVE PERCENT: 76.5 % (ref 36–66)
SODIUM BLD-SCNC: 138 MMOL/L (ref 135–145)
SODIUM URINE: 127 MMOL/L (ref 35–167)
SPECIFIC GRAVITY UA: 1.01 (ref 1–1.03)
STATUS: NORMAL
T4 FREE SERPL-MCNC: 1.11 NG/DL (ref 0.9–1.8)
TOTAL IMMATURE NEUTOROPHIL: 0.02 K/CU MM
TOTAL IMMATURE NEUTOROPHIL: 0.03 K/CU MM
TOTAL NUCLEATED RBC: 0 K/CU MM
TOTAL NUCLEATED RBC: 0 K/CU MM
TRANSFUSION STATUS: NORMAL
TRIGL SERPL-MCNC: 79 MG/DL
TSH SERPL DL<=0.005 MIU/L-ACNC: 10.31 UIU/ML (ref 0.27–4.2)
UNIT DIVISION: 0
UNIT NUMBER: NORMAL
URINE TOTAL PROTEIN: 4 MG/DL
UROBILINOGEN, URINE: 0.2 MG/DL (ref 0.2–1)
V CHOLERAE DNA SPEC QL NAA+PROBE: NOT DETECTED
VIBRIO DNA SPEC NAA+PROBE: NOT DETECTED
WBC # BLD: 7.4 K/CU MM (ref 4–10.5)
WBC # BLD: 8.5 K/CU MM (ref 4–10.5)
YERSINIA DNA SPEC NAA+PROBE: NOT DETECTED

## 2024-03-12 PROCEDURE — 6360000002 HC RX W HCPCS: Performed by: INTERNAL MEDICINE

## 2024-03-12 PROCEDURE — 85025 COMPLETE CBC W/AUTO DIFF WBC: CPT

## 2024-03-12 PROCEDURE — 2700000000 HC OXYGEN THERAPY PER DAY

## 2024-03-12 PROCEDURE — 85045 AUTOMATED RETICULOCYTE COUNT: CPT

## 2024-03-12 PROCEDURE — 93010 ELECTROCARDIOGRAM REPORT: CPT | Performed by: INTERNAL MEDICINE

## 2024-03-12 PROCEDURE — 82436 ASSAY OF URINE CHLORIDE: CPT

## 2024-03-12 PROCEDURE — 82570 ASSAY OF URINE CREATININE: CPT

## 2024-03-12 PROCEDURE — 30233N1 TRANSFUSION OF NONAUTOLOGOUS RED BLOOD CELLS INTO PERIPHERAL VEIN, PERCUTANEOUS APPROACH: ICD-10-PCS | Performed by: INTERNAL MEDICINE

## 2024-03-12 PROCEDURE — 36415 COLL VENOUS BLD VENIPUNCTURE: CPT

## 2024-03-12 PROCEDURE — 84156 ASSAY OF PROTEIN URINE: CPT

## 2024-03-12 PROCEDURE — 2580000003 HC RX 258: Performed by: STUDENT IN AN ORGANIZED HEALTH CARE EDUCATION/TRAINING PROGRAM

## 2024-03-12 PROCEDURE — 83615 LACTATE (LD) (LDH) ENZYME: CPT

## 2024-03-12 PROCEDURE — 83735 ASSAY OF MAGNESIUM: CPT

## 2024-03-12 PROCEDURE — 1200000000 HC SEMI PRIVATE

## 2024-03-12 PROCEDURE — 2580000003 HC RX 258: Performed by: INTERNAL MEDICINE

## 2024-03-12 PROCEDURE — C9113 INJ PANTOPRAZOLE SODIUM, VIA: HCPCS | Performed by: INTERNAL MEDICINE

## 2024-03-12 PROCEDURE — 6370000000 HC RX 637 (ALT 250 FOR IP): Performed by: INTERNAL MEDICINE

## 2024-03-12 PROCEDURE — 82746 ASSAY OF FOLIC ACID SERUM: CPT

## 2024-03-12 PROCEDURE — 80048 BASIC METABOLIC PNL TOTAL CA: CPT

## 2024-03-12 PROCEDURE — 84439 ASSAY OF FREE THYROXINE: CPT

## 2024-03-12 PROCEDURE — 84300 ASSAY OF URINE SODIUM: CPT

## 2024-03-12 PROCEDURE — 84133 ASSAY OF URINE POTASSIUM: CPT

## 2024-03-12 PROCEDURE — 93308 TTE F-UP OR LMTD: CPT

## 2024-03-12 PROCEDURE — 94761 N-INVAS EAR/PLS OXIMETRY MLT: CPT

## 2024-03-12 PROCEDURE — 6360000002 HC RX W HCPCS: Performed by: STUDENT IN AN ORGANIZED HEALTH CARE EDUCATION/TRAINING PROGRAM

## 2024-03-12 PROCEDURE — 81003 URINALYSIS AUTO W/O SCOPE: CPT

## 2024-03-12 PROCEDURE — 51798 US URINE CAPACITY MEASURE: CPT

## 2024-03-12 PROCEDURE — 76775 US EXAM ABDO BACK WALL LIM: CPT

## 2024-03-12 PROCEDURE — 99221 1ST HOSP IP/OBS SF/LOW 40: CPT | Performed by: NURSE PRACTITIONER

## 2024-03-12 PROCEDURE — 80061 LIPID PANEL: CPT

## 2024-03-12 PROCEDURE — 84443 ASSAY THYROID STIM HORMONE: CPT

## 2024-03-12 PROCEDURE — 82607 VITAMIN B-12: CPT

## 2024-03-12 RX ORDER — LORAZEPAM 0.5 MG/1
0.25 TABLET ORAL EVERY 6 HOURS PRN
Status: DISCONTINUED | OUTPATIENT
Start: 2024-03-12 | End: 2024-03-13 | Stop reason: HOSPADM

## 2024-03-12 RX ADMIN — LEVOTHYROXINE SODIUM 88 MCG: 0.09 TABLET ORAL at 08:24

## 2024-03-12 RX ADMIN — FUROSEMIDE 20 MG: 10 INJECTION, SOLUTION INTRAMUSCULAR; INTRAVENOUS at 09:32

## 2024-03-12 RX ADMIN — FUROSEMIDE 20 MG: 10 INJECTION, SOLUTION INTRAMUSCULAR; INTRAVENOUS at 18:53

## 2024-03-12 RX ADMIN — SODIUM CHLORIDE 200 MG: 9 INJECTION, SOLUTION INTRAVENOUS at 16:14

## 2024-03-12 RX ADMIN — SODIUM CHLORIDE, PRESERVATIVE FREE 10 ML: 5 INJECTION INTRAVENOUS at 08:25

## 2024-03-12 RX ADMIN — PANTOPRAZOLE SODIUM 40 MG: 40 INJECTION, POWDER, FOR SOLUTION INTRAVENOUS at 22:43

## 2024-03-12 RX ADMIN — PANTOPRAZOLE SODIUM 40 MG: 40 INJECTION, POWDER, FOR SOLUTION INTRAVENOUS at 09:32

## 2024-03-12 RX ADMIN — ATORVASTATIN CALCIUM 20 MG: 10 TABLET, FILM COATED ORAL at 20:39

## 2024-03-12 RX ADMIN — SODIUM CHLORIDE, PRESERVATIVE FREE 10 ML: 5 INJECTION INTRAVENOUS at 20:40

## 2024-03-12 RX ADMIN — METOPROLOL TARTRATE 25 MG: 25 TABLET, FILM COATED ORAL at 08:24

## 2024-03-12 RX ADMIN — METOPROLOL TARTRATE 25 MG: 25 TABLET, FILM COATED ORAL at 20:39

## 2024-03-12 NOTE — PROGRESS NOTES
Physician Progress Note      PATIENT:               YANG HADDAD  CSN #:                  [id number]  :                       1940  ADMIT DATE:       3/11/2024 8:11 AM  DISCH DATE:  RESPONDING  PROVIDER #:        Nabila Morgan MD          QUERY TEXT:    Pt admitted with decompensated CHF and has anemia documented. If possible,   please document in progress notes and discharge summary further specificity   regarding the acuity and type of anemia:    The medical record reflects the following:  Risk Factors: Anemia  Clinical Indicators: acute anemia, acute kidney injury CHF exacerbation   requiring IV blood transfusion, Acute on Chronic Anemia: B/l around 9; Hgb 6.8   in ED; receiving one unit while I was in the ED; stool occult was collected   by ED provider, per discussion however do not see any results so will   re-order; if positive will c/s GI; clears for now in event occult is positive   and GI decides on a scope tomorrow  Treatment: one unit PRBC, Monitoring H&H, begin PPI for now, GI consult    Thank you, Oralia Alcala RN, CDS [phone]  Options provided:  -- Anemia due to acute blood loss  -- Anemia due to chronic blood loss  -- Anemia due to acute on chronic blood loss  -- Other - I will add my own diagnosis  -- Disagree - Not applicable / Not valid  -- Disagree - Clinically unable to determine / Unknown  -- Refer to Clinical Documentation Reviewer    PROVIDER RESPONSE TEXT:    This patient has chronic blood loss anemia.    Query created by: Oralia Alcala on 3/12/2024 11:03 AM      Electronically signed by:  Nabila Morgan MD 3/12/2024 1:22 PM

## 2024-03-12 NOTE — CONSULTS
gi work up  4 hold ace arb and low dose lasix 20mg iv bid for now and with likely cardiorenal and for now accept the increase creat for diuresis and do work up  5 o2 better 2L NC and monitor as wean o2    Electronically signed by MALIKA BONILLA MD on 3/12/2024 at 12:11 PM

## 2024-03-12 NOTE — PROGRESS NOTES
Met with patient and introduced myself as the Heart failure education R.N. I saw this patient for heart failure education last year.  Patient has low BMI. States she does not have a lack of appetite.  Patient does grocery shopping and cooking. States she is \" a terrible cook\" and most of their meals are fast food or freezer meals.     Admitting diagnosis-decompensated heart failure  Cardiologist- eDnice Ward  Heart Failure Education Nurse consulted- yes   Ejection fraction 50-55% as of 3/11/2024 with mod to severe valve regurgitation  Pro BNP-31,777  on 3/11/24  Hospital follow up appt-  Dr. Ling Garcia on 3/26  Patient informed of appointment and appointment added to AVS  Cardiac rehabilitation referral-N/A  ICD information- N/A   Smoking Cessation information and referral- vaping   Pneumonia vaccine- refused  PCP- Sonu  Patient has a digital scale? Yes   Transportation- has transportation    Able to obtain medications without difficulty?- Yes- Patient denies difficulty in obtaining or taking medications. Advised not to stop taking a medication without notifying provider.    Reviewed Heart failure patient education book with patient. Heart failure education included: Type of heart failure, How it is diagnosed, causes, symptoms, medications, diet, daily weights, exercise and fluid control.   Patient's heart failure medications reviewed and information given on each.     Reviewed the Stop Light Handout with patient and instructed when to call her provider.   Patient was engaged and attentive during education session.    The following handouts were reviewed with patient and patient was given a copy of the following : Heart Failure education booklet, the 'Stop Light' Handout,  a link to the American [employer] Association's Healthier Living with Heart Failure Interactive workbook and a list of heart failure related education available on the hospital TV and how to access it.

## 2024-03-12 NOTE — CONSULTS
Comprehensive Nutrition Assessment    Type and Reason for Visit:  Initial, Consult, Patient Education (Low BMI, Heart Failure)    Nutrition Recommendations/Plan:   Trial clear liquid oral nutrition supplements and Begin Fortified Gelatin Oral nutrition supplement  Diet advancement per MD   Recommend No Added Salt Diet   Please document all PO intakes in I/O   Will assess needs for diet education and NFPE at reassess     Malnutrition Assessment:  Malnutrition Status:  Insufficient data (hx of severe muscle/fat wasting, at risk) (03/12/24 1302)    Context:  Chronic Illness     Findings of the 6 clinical characteristics of malnutrition:  Energy Intake:  No significant decrease in energy intake (chronic small intake, denies change in appetite)  Weight Loss:  No significant weight loss (wt gain x 1 yr)     Body Fat Loss:  Unable to assess     Muscle Mass Loss:  Unable to assess    Fluid Accumulation:  No significant fluid accumulation Extremities   Strength:  Not Performed    Nutrition Assessment:    Admitted with Weakness and SOB s/s Decompensated Heart Failure EF 50-55%, currently on 2L NC. Hx HTN, HLD, CHF, Severe Malnutrition. Briefly visited pt, pt reports feeling hungry while on clear liquids, pt would like to eat. States is a small eater in portions, has struggled chronically to gain weight. C/o throbbing pain from left heel, noted wound. RD did not discussed heart healthy eating plan at this time as MD in room. Will reassess if education is appropriate. Pt appears thin frame, hx low BMI x 1 yr, noted weight gain over the past yr. Remains underweight, will monitor closely as high nutrition risk as pt is at risk for malnutrition.    Nutrition Related Findings:    Hgb 7.2, GFR 35, HDL 38 +lasix, synthroid, protonix; torsemide held (home med). No edema present. Wound Type: Multiple, Pressure Injury, Stage I, Wound Consult Pending (Other on heel)       Current Nutrition Intake & Therapies:    Average Meal Intake:

## 2024-03-12 NOTE — PROGRESS NOTES
Patient arrived back to unit from /S, Vitals stable and patient has no current complaints. Bedside table and call light in reach, care continues.

## 2024-03-12 NOTE — PROGRESS NOTES
V2.0    [facility] [place]      Name:  Luci Coleman /Age/Sex: 1940  (83 y.o. female)   MRN & CSN:  [id number] & [id number] Encounter Date/Time: 3/12/2024 3:02 PM EDT   Location:  [address] [place] [address] PCP: Deon Kessler     Attending:Nabila Wu,*       Hospital Day: 2    Assessment and Recommendations   Luci Coleman is a 83 y.o. female who presents with Decompensated heart failure (HCC)      Plan:     Acute Decompensated Heart Failure: CXR with pulmonary edema; was recently taken off her home Torsemide; one time dose ordered while patient is in ED;  telemetry, Cardiology consulted for assistance with diuretic recs on discharge as patient was just taken off home Torsemide        On IV lasix BID    Acute on Chronic Anemia: B/l around 9; Hgb 6.8 in ED; receiving one unit while I was in the ED; stool occult was collected by ED provider per discussion however do not see any results so will re-order; if positive will c/s GI; clears for now in event occult is positive and GI decides on a scope tomorrow; begin PPI for now        FOBT negative. Get hematology for Assessment    Acute Hypoxemic Respiratory Failure:  On 2L NC in setting of above, continue diuresis    Abnormal Troponin: Suspecting demand in setting of anemia, decompensated heart failure; no ischemic EKG changes    BERTIN: Holding ACE. Nephrology consulted. Trend BMP    Hx Paroxysmal Atrial Fibrillation: Continue Metoprolol but holding Eliquis in light of anemia and pending stool occult    History of Hypothyroidism: Continue Levothyroxine    Hx of Pulmonary HTN/Severe MR/AS s/p TAVR: Continue diuresis as per above     I had a 10 minute code status discussion with the patient regarding her code status and patient would like to remain a full code      Diet ADULT ORAL NUTRITION SUPPLEMENT; Breakfast, Lunch, Dinner; Fortified Gelatin Oral Supplement  ADULT ORAL NUTRITION SUPPLEMENT; Breakfast, Dinner; Clear Liquid Oral Supplement  ADULT DIET;  PRN  acetaminophen, 650 mg, Q6H PRN   Or  acetaminophen, 650 mg, Q6H PRN  prochlorperazine, 10 mg, Q6H PRN        Labs and Imaging   Echo (TTE) limited (PRN contrast/bubble/strain/3D)    Result Date: 3/12/2024    Left Ventricle: Normal left ventricular systolic function with a visually estimated EF of 50 - 55%.   Aortic Valve: S/p TAVR; appers to be functioning normally Mean PG 9mmHg.   Mitral Valve: Moderate to Severe regurgiation present ERO, 0.36 cm2, MR volume 80 ml. Moderate stenosis noted. MV mean gradient is 8 mmHg.   Tricuspid Valve: Severe regurgitation. The estimated RVSP is 76 mmHg.   Left Atrium: Left atrium is moderately dilated.   Interatrial Septum: Bubble study was negative; no evidence of drop out or right to left shunt.   Pericardium: No pericardial effusion.     XR CHEST PORTABLE    Result Date: 3/11/2024  EXAM: PORTABLE AP CHEST X-RAY, 8:39 AM INDICATION: shortness of breath COMPARISON: 2/8/2023 FINDINGS: Trachea is midline. Cardiomegaly Mild aortic tortuosity Healed Mild hazy pulmonary density with bilateral pleural effusions. Peripheral interstitial Kerley B-lines. Osteoporosis     1. Cardiomegaly with pulmonary interstitial edema Electronically signed by Gianfranco Martinez MD      CBC:   Recent Labs     03/11/24  0833 03/11/24  1314 03/12/24  0515   WBC 11.9*  --  8.5   HGB 6.8* 7.6* 7.2*     --  276     BMP:    Recent Labs     03/11/24  0833 03/12/24  0515    138   K 4.3 3.8    103   CO2 19* 24   BUN 17 15   CREATININE 1.4* 1.5*   GLUCOSE 134* 93     Hepatic:   Recent Labs     03/11/24  0833   AST 38*   ALT 29   BILITOT 0.6   ALKPHOS 110     Lipids:   Lab Results   Component Value Date/Time    CHOL 136 03/12/2024 05:15 AM    HDL 38 03/12/2024 05:15 AM    TRIG 79 03/12/2024 05:15 AM     Hemoglobin A1C:   Lab Results   Component Value Date/Time    LABA1C 6.5 05/31/2022 08:18 AM     TSH: No results found for: \"TSH\"  Troponin:   Lab Results   Component Value Date/Time

## 2024-03-12 NOTE — PROGRESS NOTES
Physician Progress Note      PATIENT:               YANG HADDAD  CSN #:                  [id number]  :                       1940  ADMIT DATE:       3/11/2024 8:11 AM  DISCH DATE:  RESPONDING  PROVIDER #:        Konstantin Perdue MD          QUERY TEXT:    Pt admitted with Decompensated heart failure. If possible, please document in   progress notes and discharge summary further specificity regarding the type of   CHF:    The medical record reflects the following:  Risk Factors: HTN, CAD  Clinical Indicators: Per ED \"Acute on chronic systolic congestive heart   failure\", Per H&P \"Acute Decompensated Heart Failure, PMHX Of Acute on chronic   diastolic heart failure\", [id number]  Treatment: IV Lasix, Lab  Options provided:  -- Acute on Chronic Systolic CHF/HFrEF  -- Acute on Chronic Diastolic CHF/HFpEF  -- Acute on Chronic Systolic and Diastolic CHF  -- Other - I will add my own diagnosis  -- Disagree - Not applicable / Not valid  -- Disagree - Clinically unable to determine / Unknown  -- Refer to Clinical Documentation Reviewer    PROVIDER RESPONSE TEXT:    This patient is in acute on chronic systolic CHF/HFrEF.    Query created by: Orlaia Alacla on 3/12/2024 10:56 AM      Electronically signed by:  Konstantin Perdue MD 3/12/2024 12:09 PM

## 2024-03-12 NOTE — PROGRESS NOTES
Daily Progress Note  Subjective:  Alert , oriented, NAD  Tele SR 64, /72  Denies CP, Dizziness or palp. Endorses SOB    Attending Note:  Some SOB, but denies CP or dizziness.  VS stable.  ProBNP 31K. Hgb 7.9.  Diuresis.  GI / Heme eval.    Impression and Plan:   Acute hypoxic resp failure   Primary following  On OS 2LNC , diruesis  EF 50-55%, Hgb 7.6.    Acute on Chronic HFpEF  EF 50-55%  HST 86.  ProBNP 31K. Cr 1.4. Hgb 7.6.  Diurese  Monitor labs  Continue bb and lasix 20mg BID     Chronic anemia  Currently hgb 7.1  Chronic but has not been below 9.1 in the recent past  Reported Dark stools   GI consult ? Spoke with [facility]oc on this patient  CT in 2/24 suspicious Comment: Findings suspicious for active GI bleeding near the gastroesophageal  junction discussed with ordering provider Dr. Garcia at 1005 hours on  02/07/2023.  EGD 2/8  Iron def in 2022  Transfusing today    S/P TAVR    CAD  Statin, BB  No ASA dt chronic anemia    Most Recent Echo  3/12    Left Ventricle: Normal left ventricular systolic function with a visually estimated EF of 50 - 55%.    Aortic Valve: S/p TAVR; appers to be functioning normally Mean PG 9mmHg.    Mitral Valve: Moderate to Severe regurgiation present ERO, 0.36 cm2, MR volume 80 ml. Moderate stenosis noted. MV mean gradient is 8 mmHg.    Tricuspid Valve: Severe regurgitation. The estimated RVSP is 76 mmHg.    Left Atrium: Left atrium is moderately dilated.    Interatrial Septum: Bubble study was negative; no evidence of drop out or right to left shunt.    Pericardium: No pericardial effusion.      Most Recent Heart Cath   5/22  IMPRESSION:  1.  Left main is patent.  2.  LAD has mild disease noted.  3.  Circ has mild disease noted.  4.  RCA ostium calcium noted.  No dampening of pressure noted.  Just  mild disease noted.  5.  Aortic valve is not crossed.  6.  There is a significant mitral annular calcification present.  7.  Right heart pressures are normal.  8.  The patient has

## 2024-03-12 NOTE — PLAN OF CARE
Problem: Discharge Planning  Goal: Discharge to home or other facility with appropriate resources  3/12/2024 0955 by Georgette Campos LPN  Outcome: Progressing  3/12/2024 0352 by An Jewell RN  Outcome: Progressing     Problem: Pain  Goal: Verbalizes/displays adequate comfort level or baseline comfort level  3/12/2024 0955 by Georgette Campos LPN  Outcome: Progressing  3/12/2024 0352 by An Jewell RN  Outcome: Progressing     Problem: Safety - Adult  Goal: Free from fall injury  3/12/2024 0955 by Georgette Campos LPN  Outcome: Progressing  3/12/2024 0352 by An Jewell RN  Outcome: Progressing     Problem: Chronic Conditions and Co-morbidities  Goal: Patient's chronic conditions and co-morbidity symptoms are monitored and maintained or improved  3/12/2024 0955 by Georgette Campos LPN  Outcome: Progressing  3/12/2024 0352 by An Jewell RN  Outcome: Progressing

## 2024-03-12 NOTE — CONSULTS
Initial Psychiatric History and Physical    Luci Coleman  [id number]  3/11/2024  03/12/24    ID: Patient is a 83 yrs y.o. female    CC:anxiety    HPI: Patient is an 83 year old female with pmhx of  HFpEF, severe MR, Pulmonary HTN, paroxysmal atrial fibrillation, HLD, Hypothyroidism who presents to [facility] ED with family, due to SOB. Patient received PRBC on 3/11/24. Patient with acute decompensated HF with pulmonary edema.  Consults include cardiology.   Psychiatry consulted by DR Perdue due to \"history of anxiety and would like to start medications.\"      Met with patient at bedside. She is sitting up in a well lit room talking to her son. She is alert and oriented x 4. She denies SI/HI. Denies auditory and visual hallucinations. Notes anxiety, \"I am always anxious.\" Discussed medication management vs breathing exercises to assist with anxiety. Discussed that currently medication not recommended, except for low dose ativan, while in the hospital only. Patient encouraged to obtain psychiatric provider at [place] when she is more stable. Patient in agreement. Patient instructed and practiced on 4 x 4 technique. Also discussed free CALM lonnie for anxiety. Insight and judgment adequate     Past Psychiatric History:   Denies SI, SA or psychiatric hospitalization  No hx of psychiatric medications         Substance Use history  Tobacco denies  Recreational drugs denies  Alcohol denies  Caffeine- 2 luisana a day. Discussed caffeine as a stimulant and how anxiety is driven    Social History  Patient graduated HS. She is  x 33 years. She had 4 boys and one girl.     Family Psychiatric History:   No family history on file.     Allergies:  Allergies   Allergen Reactions    Ceclor [Cefaclor] Hives        OBJECTIVE  Vital Signs:  Vitals:    03/12/24 0815   BP: (!) 143/72   Pulse: 67   Resp: 18   Temp: 98.2 °F (36.8 °C)   SpO2: 94%       Labs:  Recent Results (from the past 48 hour(s))   CBC    Collection Time: 03/11/24  8:33 AM    Result Value Ref Range    WBC 11.9 (H) 4.0 - 10.5 K/CU MM    RBC 2.32 (L) 4.2 - 5.4 M/CU MM    Hemoglobin 6.8 (LL) 12.5 - 16.0 GM/DL    Hematocrit 22.8 (L) 37 - 47 %    MCV 98.3 78 - 100 FL    MCH 29.3 27 - 31 PG    MCHC 29.8 (L) 32.0 - 36.0 %    RDW 12.4 11.7 - 14.9 %    Platelets 326 140 - 440 K/CU MM    MPV 10.6 7.5 - 11.1 FL   Comprehensive Metabolic Panel    Collection Time: 03/11/24  8:33 AM   Result Value Ref Range    Sodium 136 135 - 145 MMOL/L    Potassium 4.3 3.5 - 5.1 MMOL/L    Chloride 103 99 - 110 mMol/L    CO2 19 (L) 21 - 32 MMOL/L    Anion Gap 14 7 - 16    Glucose 134 (H) 70 - 99 MG/DL    BUN 17 6 - 23 MG/DL    Creatinine 1.4 (H) 0.6 - 1.1 MG/DL    Est, Glom Filt Rate 38 (L) >60 mL/min/1.73m2    Calcium 9.2 8.3 - 10.6 MG/DL    Total Protein 7.6 6.4 - 8.2 GM/DL    Albumin 3.8 3.4 - 5.0 GM/DL    Total Bilirubin 0.6 0.0 - 1.0 MG/DL    Alkaline Phosphatase 110 40 - 128 IU/L    ALT 29 10 - 40 U/L    AST 38 (H) 15 - 37 IU/L   Troponin    Collection Time: 03/11/24  8:33 AM   Result Value Ref Range    Troponin, High Sensitivity 93 (HH) 0 - 14 ng/L   Brain Natriuretic Peptide    Collection Time: 03/11/24  8:33 AM   Result Value Ref Range    Pro-BNP 31,177 (H) <300 PG/ML   COVID-19, Rapid    Collection Time: 03/11/24  9:02 AM    Specimen: Nasopharyngeal   Result Value Ref Range    Source NARES     SARS-CoV-2, NAAT NOT DETECTED NOT DETECTED   Influenza A/B, Molecular    Collection Time: 03/11/24  9:02 AM    Specimen: Nasopharyngeal   Result Value Ref Range    Influenza A Antigen NOT DETECTED NOT DETECTED    Influenza B Antigen NOT DETECTED NOT DETECTED   EKG 12 Lead    Collection Time: 03/11/24  9:05 AM   Result Value Ref Range    Ventricular Rate 90 BPM    Atrial Rate 90 BPM    P-R Interval 154 ms    QRS Duration 132 ms    Q-T Interval 446 ms    QTc Calculation (Bazett) 545 ms    P Axis 66 degrees    R Axis -56 degrees    T Axis 77 degrees    Diagnosis       Sinus rhythm with premature atrial complexes  Left

## 2024-03-13 ENCOUNTER — APPOINTMENT (OUTPATIENT)
Dept: GENERAL RADIOLOGY | Age: 84
DRG: 291 | End: 2024-03-13
Payer: MEDICARE

## 2024-03-13 VITALS
HEART RATE: 69 BPM | RESPIRATION RATE: 19 BRPM | SYSTOLIC BLOOD PRESSURE: 143 MMHG | DIASTOLIC BLOOD PRESSURE: 80 MMHG | OXYGEN SATURATION: 96 % | HEIGHT: 65 IN | WEIGHT: 91 LBS | TEMPERATURE: 98 F | BODY MASS INDEX: 15.16 KG/M2

## 2024-03-13 LAB
ALBUMIN SERPL-MCNC: 3.2 GM/DL (ref 3.4–5)
ANION GAP SERPL CALCULATED.3IONS-SCNC: 12 MMOL/L (ref 7–16)
BASOPHILS ABSOLUTE: 0.1 K/CU MM
BASOPHILS RELATIVE PERCENT: 0.7 % (ref 0–1)
BUN SERPL-MCNC: 18 MG/DL (ref 6–23)
CALCIUM SERPL-MCNC: 7.9 MG/DL (ref 8.3–10.6)
CHLORIDE BLD-SCNC: 102 MMOL/L (ref 99–110)
CO2: 25 MMOL/L (ref 21–32)
CREAT SERPL-MCNC: 1.4 MG/DL (ref 0.6–1.1)
DIFFERENTIAL TYPE: ABNORMAL
EOSINOPHILS ABSOLUTE: 0.4 K/CU MM
EOSINOPHILS RELATIVE PERCENT: 5.2 % (ref 0–3)
GFR SERPL CREATININE-BSD FRML MDRD: 38 ML/MIN/1.73M2
GLUCOSE SERPL-MCNC: 98 MG/DL (ref 70–99)
HCT VFR BLD CALC: 25.7 % (ref 37–47)
HEMOGLOBIN: 7.9 GM/DL (ref 12.5–16)
IMMATURE NEUTROPHIL %: 0.4 % (ref 0–0.43)
LYMPHOCYTES ABSOLUTE: 0.8 K/CU MM
LYMPHOCYTES RELATIVE PERCENT: 10.9 % (ref 24–44)
MAGNESIUM: 1.8 MG/DL (ref 1.8–2.4)
MCH RBC QN AUTO: 29.6 PG (ref 27–31)
MCHC RBC AUTO-ENTMCNC: 30.7 % (ref 32–36)
MCV RBC AUTO: 96.3 FL (ref 78–100)
MONOCYTES ABSOLUTE: 1 K/CU MM
MONOCYTES RELATIVE PERCENT: 13.3 % (ref 0–4)
NUCLEATED RBC %: 0 %
PDW BLD-RTO: 12.7 % (ref 11.7–14.9)
PHOSPHORUS: 4.1 MG/DL (ref 2.5–4.9)
PLATELET # BLD: 346 K/CU MM (ref 140–440)
PMV BLD AUTO: 10.6 FL (ref 7.5–11.1)
POTASSIUM SERPL-SCNC: 3.7 MMOL/L (ref 3.5–5.1)
RBC # BLD: 2.67 M/CU MM (ref 4.2–5.4)
SEGMENTED NEUTROPHILS ABSOLUTE COUNT: 5.1 K/CU MM
SEGMENTED NEUTROPHILS RELATIVE PERCENT: 69.5 % (ref 36–66)
SODIUM BLD-SCNC: 139 MMOL/L (ref 135–145)
TOTAL IMMATURE NEUTOROPHIL: 0.03 K/CU MM
TOTAL NUCLEATED RBC: 0 K/CU MM
WBC # BLD: 7.3 K/CU MM (ref 4–10.5)

## 2024-03-13 PROCEDURE — APPNB60 APP NON BILLABLE TIME 46-60 MINS: Performed by: PHYSICIAN ASSISTANT

## 2024-03-13 PROCEDURE — APPNB60 APP NON BILLABLE TIME 46-60 MINS: Performed by: LICENSED PRACTICAL NURSE

## 2024-03-13 PROCEDURE — 94761 N-INVAS EAR/PLS OXIMETRY MLT: CPT

## 2024-03-13 PROCEDURE — 71045 X-RAY EXAM CHEST 1 VIEW: CPT

## 2024-03-13 PROCEDURE — 2580000003 HC RX 258: Performed by: INTERNAL MEDICINE

## 2024-03-13 PROCEDURE — 80048 BASIC METABOLIC PNL TOTAL CA: CPT

## 2024-03-13 PROCEDURE — 6360000002 HC RX W HCPCS: Performed by: INTERNAL MEDICINE

## 2024-03-13 PROCEDURE — C9113 INJ PANTOPRAZOLE SODIUM, VIA: HCPCS | Performed by: INTERNAL MEDICINE

## 2024-03-13 PROCEDURE — 83735 ASSAY OF MAGNESIUM: CPT

## 2024-03-13 PROCEDURE — 80069 RENAL FUNCTION PANEL: CPT

## 2024-03-13 PROCEDURE — 85025 COMPLETE CBC W/AUTO DIFF WBC: CPT

## 2024-03-13 PROCEDURE — 99222 1ST HOSP IP/OBS MODERATE 55: CPT | Performed by: INTERNAL MEDICINE

## 2024-03-13 PROCEDURE — 6370000000 HC RX 637 (ALT 250 FOR IP): Performed by: INTERNAL MEDICINE

## 2024-03-13 PROCEDURE — 36415 COLL VENOUS BLD VENIPUNCTURE: CPT

## 2024-03-13 RX ORDER — TORSEMIDE 20 MG/1
20 TABLET ORAL DAILY
Qty: 30 TABLET | Refills: 0 | Status: SHIPPED | OUTPATIENT
Start: 2024-03-13 | End: 2024-04-12

## 2024-03-13 RX ORDER — FERROUS SULFATE 325(65) MG
325 TABLET ORAL
Qty: 180 TABLET | Refills: 1 | Status: SHIPPED | OUTPATIENT
Start: 2024-03-13

## 2024-03-13 RX ORDER — PANTOPRAZOLE SODIUM 40 MG/1
40 TABLET, DELAYED RELEASE ORAL
Qty: 90 TABLET | Refills: 1 | Status: SHIPPED | OUTPATIENT
Start: 2024-03-13

## 2024-03-13 RX ADMIN — METOPROLOL TARTRATE 25 MG: 25 TABLET, FILM COATED ORAL at 08:34

## 2024-03-13 RX ADMIN — SODIUM CHLORIDE, PRESERVATIVE FREE 10 ML: 5 INJECTION INTRAVENOUS at 08:35

## 2024-03-13 RX ADMIN — LEVOTHYROXINE SODIUM 88 MCG: 0.09 TABLET ORAL at 08:34

## 2024-03-13 RX ADMIN — PANTOPRAZOLE SODIUM 40 MG: 40 INJECTION, POWDER, FOR SOLUTION INTRAVENOUS at 08:34

## 2024-03-13 RX ADMIN — FUROSEMIDE 20 MG: 10 INJECTION, SOLUTION INTRAMUSCULAR; INTRAVENOUS at 08:34

## 2024-03-13 ASSESSMENT — PAIN SCALES - GENERAL: PAINLEVEL_OUTOF10: 0

## 2024-03-13 NOTE — DISCHARGE SUMMARY
Ventricle: Normal left ventricular systolic function with a visually estimated EF of 50 - 55%.   Aortic Valve: S/p TAVR; appers to be functioning normally Mean PG 9mmHg.   Mitral Valve: Moderate to Severe regurgiation present ERO, 0.36 cm2, MR volume 80 ml. Moderate stenosis noted. MV mean gradient is 8 mmHg.   Tricuspid Valve: Severe regurgitation. The estimated RVSP is 76 mmHg.   Left Atrium: Left atrium is moderately dilated.   Interatrial Septum: Bubble study was negative; no evidence of drop out or right to left shunt.   Pericardium: No pericardial effusion.     XR CHEST PORTABLE    Result Date: 3/11/2024  EXAM: PORTABLE AP CHEST X-RAY, 8:39 AM INDICATION: shortness of breath COMPARISON: 2/8/2023 FINDINGS: Trachea is midline. Cardiomegaly Mild aortic tortuosity Healed Mild hazy pulmonary density with bilateral pleural effusions. Peripheral interstitial Kerley B-lines. Osteoporosis     1. Cardiomegaly with pulmonary interstitial edema Electronically signed by Gianfranco Martinez MD      CBC:   Recent Labs     03/12/24  0515 03/12/24  1638 03/13/24  0505   WBC 8.5 7.4 7.3   HGB 7.2* 7.9* 7.9*    299 346     BMP:    Recent Labs     03/11/24  0833 03/12/24  0515 03/13/24  0505    138 139   K 4.3 3.8 3.7    103 102   CO2 19* 24 25   BUN 17 15 18   CREATININE 1.4* 1.5* 1.4*   GLUCOSE 134* 93 98     Hepatic:   Recent Labs     03/11/24  0833   AST 38*   ALT 29   BILITOT 0.6   ALKPHOS 110     Lipids:   Lab Results   Component Value Date/Time    CHOL 136 03/12/2024 05:15 AM    HDL 38 03/12/2024 05:15 AM    TRIG 79 03/12/2024 05:15 AM     Hemoglobin A1C:   Lab Results   Component Value Date/Time    LABA1C 6.5 05/31/2022 08:18 AM     TSH: No results found for: \"TSH\"  Troponin:   Lab Results   Component Value Date/Time    TROPONINT 0.043 02/07/2023 09:35 AM    TROPONINT 0.043 02/07/2023 02:02 AM    TROPONINT 0.035 02/06/2023 08:47 PM     Lactic Acid: No results for input(s): \"LACTA\" in the last 72  hours.  BNP:   Recent Labs     03/11/24  0833   PROBNP 31,177*     UA:  Lab Results   Component Value Date/Time    NITRU NEGATIVE 03/12/2024 01:00 PM    COLORU YELLOW 03/12/2024 01:00 PM    WBCUA NONE SEEN 06/27/2022 10:51 AM    RBCUA NONE SEEN 06/27/2022 10:51 AM    TRICHOMONAS NONE SEEN 06/27/2022 10:51 AM    BACTERIA NEGATIVE 06/27/2022 10:51 AM    CLARITYU CLEAR 03/12/2024 01:00 PM    SPECGRAV 1.015 03/12/2024 01:00 PM    LEUKOCYTESUR NEGATIVE 03/12/2024 01:00 PM    UROBILINOGEN 0.2 03/12/2024 01:00 PM    BILIRUBINUR NEGATIVE 03/12/2024 01:00 PM    BLOODU NEGATIVE 03/12/2024 01:00 PM    KETUA NEGATIVE 03/12/2024 01:00 PM     Urine Cultures: No results found for: \"LABURIN\"  Blood Cultures: No results found for: \"BC\"  No results found for: \"BLOODCULT2\"  Organism: No results found for: \"ORG\"    Time Spent Discharging patient 55 minutes    Electronically signed by Nabila Morgan MD on 3/13/2024 at 4:46 PM

## 2024-03-13 NOTE — CARE COORDINATION
Reviewed chart, discussed in IDR and spoke with pt/ about discharge plan.  They still plan on pt returning home with  and continues to deny need for HC or any DME.  CM available as needed.

## 2024-03-13 NOTE — CONSULTS
Hematology Oncology Inpatient Consult    Patient Name:  Luci Coleman  Patient :  1940  Patient MRN:  [id number]     PCP: Deon Kessler     Date of Service: 3/11/2024      Reason for Consult: Anemia     Chief Complaint:    Chief Complaint   Patient presents with    Shortness of Breath     Edema to BLE     Principal Problem:    Decompensated heart failure (HCC)  Active Problems:    NINA (generalized anxiety disorder)  Resolved Problems:    * No resolved hospital problems. *      HPI:   Luci Coleman is a 83 y.o. female with a history of mitral regurgitation, tricuspid regurgitation, aortic stenosis (s/p TAVR 22), HFpEF, atrial fib/flutter (Eliquis), nonobstructive CAD, HTN, polymyalgia rheumatica, anemia, and hypothyroidism currently hospitalized for decompensated heart failure. Cardiology is following and managing diuresis as well as Nephrology. They are allowing for Cr elevation with diuresis in setting of likely cardiorenal syndrome.     Normocytic anemia with Hgb 6.8 on presentation with Hct 22, MCV 98, MCHC 29, RDW 12. She is s/p 1u PRBC, now stable at 7.9. Previous baseline ~9-10 since at least May 2022,  has been as high as 11.4 however appears to be outlier in trend from labs locally and per outside records. Had anemia workup at [facility] for similar results in 2023 which ruled out hemolysis at that time. Has a history of UGIB in 2023 (contrast extravasation noted on CT) at which time she had candida esophagitis. FOBT x 1 was negative this admission. 3/12/2024 , Retic 1.7%, B12 388, Folate pending. 3/11/2024 TIBC 208, Tsat 11%, no ferritin drawn.  On admission T bili 0.6, LFT otherwise unremarkable. Cr 1.4 from baseline 1.1. TSH was elevated with normal FT4. WBC 7.3, Plt 346k. Received parenteral iron 200mg yesterday and today.     On exam daughter-in-law is at bedside with patient's permission, pt states she is forgetful.  Feeling improved since admission  hemolysis. B12 low normal as well as low iron sats. Agree with iron replacement and recommend b12 oral replacement. R/o MG. May have MDS. Discussed BMB evaluation.   2)GI eval as OP  3)Continue other medical care       My documented MDM is a substantive portion and Majority of the supervisory visit.       Comment: Please note this report has been produced using speech recognition software and may contain errors related to that system including errors in grammar, punctuation, and spelling, as well as words and phrases that may be inappropriate. If there are any questions or concerns please feel free to contact the dictating provider for clarification.      [facility]

## 2024-03-13 NOTE — CONSULTS
Value Ref Range    Sodium 139 135 - 145 MMOL/L    Potassium 3.7 3.5 - 5.1 MMOL/L    Chloride 102 99 - 110 mMol/L    CO2 25 21 - 32 MMOL/L    Anion Gap 12 7 - 16    Glucose 98 70 - 99 MG/DL    BUN 18 6 - 23 MG/DL    Creatinine 1.4 (H) 0.6 - 1.1 MG/DL    Est, Glom Filt Rate 38 (L) >60 mL/min/1.73m2    Calcium 7.9 (L) 8.3 - 10.6 MG/DL    Albumin 3.2 (L) 3.4 - 5.0 GM/DL    Phosphorus 4.1 2.5 - 4.9 MG/DL       IMPRESSION/RECOMMENDATIONS:  1) acute on chronic anemia, hgb 7.9, hemodynamically stable, no overt blood loss noted, normal BUN, given negative occult blood this is most likely anemia of chronic disease, other differentials could be UGIB, LGIB  - noted to have low iron, low TIBC, low normal transferrin of 11  -Iron replacements recommended  -No NSAID use, advised to avoid  -Trend H/H  -transfuse if hgb <7  -recommend conservative treatment  -EGD-had recent 1 with  2/2023: Diffuse, white plaques were found in the middle third of the esophagus and in the lower third of the esophagus. Biopsies were taken with a cold forceps for histology. The gastroesophageal junction was normal. A 2 cm hiatal hernia was present. The entire examined stomach was normal. The cardia and gastric fundus were normal on retroflexion. The examined duodenum was normal. Biopsies confirmed Candidas patient believes she completed a course of antifungal treatment and remembers it was very bad tasting.   -Consider CTA if becomes hemodynamically unstable    -Recommend outpatient follow up with our office  -recommend outpatient colonoscopy if given cardiac clearance  -recommend follow up with Hematology/oncoloogy  -recommend PPI 40mg daily     2) acute decompensated heart failure, chest x-ray with pulmonary edema, on Lasix IV twice daily-will defer to hospital medicine/cardiology and appreciate recommendations    3) acute hypoxemic respiratory failure, on 2 L of oxygen, patient unaware if she is on oxygen at home-will defer to hospital  medicine and appreciate recommendations    4) acute kidney injury, nephrology has been consulted, will defer to nephrology and appreciate recommendations    5) history of proximal atrial fibrillation, at home on Eliquis, currently being held due to anemia  -Stool occult pending    6) history of hypothyroidism-will defer to hospital medicine appreciate recommendations    8) history of pulmonary hypertension, severe MR/AAS status post TAVR.-Will defer to hospital medicine and cardiology and appreciate recommendations        Patient's history, exam, and plan of care were discussed with the patient and Dr. Hodges  . All questions and concerns were discussed with the patient. Patient agreed to plan.      Electronically signed by SUHAIL Huerta CNP on 3/13/2024 at 10:31 AM

## 2024-03-13 NOTE — DISCHARGE INSTR - DIET

## 2024-03-13 NOTE — DISCHARGE INSTRUCTIONS
I have held your blood thinner due to possible concern for GI bleed. Please follow up with Dr. Hodges in a week. He would like to get  a Colonoscopy done. Once the colonoscopy is done he will decide if the eliquis can be resumed. You will also require a cardiac clearance before the colonoscopy so you can let your cardiologist know about that.     You have been started on Torsemide by Dr. Cortes. He will monitor your renal function as outpatient

## 2024-03-13 NOTE — PROGRESS NOTES
Nephrology Progress Note  3/13/2024 1:33 PM  Subjective:     Interval History: Luci Coleman is a 83 y.o. female with overall doing better no distress today        Data:   Scheduled Meds:   iron sucrose  200 mg IntraVENous Q24H    atorvastatin  20 mg Oral Nightly    metoprolol tartrate  25 mg Oral BID    sodium chloride flush  5-40 mL IntraVENous 2 times per day    furosemide  20 mg IntraVENous BID    levothyroxine  88 mcg Oral Daily    pantoprazole  40 mg IntraVENous BID     Continuous Infusions:   sodium chloride      sodium chloride           CBC   Recent Labs     03/12/24  0515 03/12/24  1638 03/13/24  0505   WBC 8.5 7.4 7.3   HGB 7.2* 7.9* 7.9*   HCT 23.4* 24.9* 25.7*    299 346      BMP   Recent Labs     03/11/24  0833 03/12/24  0515 03/13/24  0505    138 139   K 4.3 3.8 3.7    103 102   CO2 19* 24 25   PHOS  --   --  4.1   BUN 17 15 18   CREATININE 1.4* 1.5* 1.4*     Hepatic:   Recent Labs     03/11/24  0833   AST 38*   ALT 29   BILITOT 0.6   ALKPHOS 110     Troponin: No results for input(s): \"TROPONINI\" in the last 72 hours.  BNP: No results for input(s): \"BNP\" in the last 72 hours.  Lipids:   Recent Labs     03/12/24  0515   CHOL 136   HDL 38*     ABGs:   Lab Results   Component Value Date/Time    PO2ART 82 05/31/2022 08:30 AM    JUI1SLJ 47.0 05/31/2022 08:30 AM     INR: No results for input(s): \"INR\" in the last 72 hours.  Renal Labs  Albumin:    Lab Results   Component Value Date/Time    LABALBU 3.2 03/13/2024 05:05 AM     Calcium:    Lab Results   Component Value Date/Time    CALCIUM 7.9 03/13/2024 05:05 AM     Phosphorus:    Lab Results   Component Value Date/Time    PHOS 4.1 03/13/2024 05:05 AM     U/A:    Lab Results   Component Value Date/Time    NITRU NEGATIVE 03/12/2024 01:00 PM    COLORU YELLOW 03/12/2024 01:00 PM    WBCUA NONE SEEN 06/27/2022 10:51 AM    RBCUA NONE SEEN 06/27/2022 10:51 AM    TRICHOMONAS NONE SEEN 06/27/2022 10:51 AM    BACTERIA NEGATIVE 06/27/2022 10:51 AM

## 2024-03-14 LAB
FOLATE SERPL-MCNC: 14.3 NG/ML (ref 3.1–17.5)
VITAMIN B-12: 388.4 PG/ML (ref 211–911)

## 2024-03-18 ENCOUNTER — TELEPHONE (OUTPATIENT)
Dept: ONCOLOGY | Age: 84
End: 2024-03-18

## 2024-03-25 ENCOUNTER — TELEPHONE (OUTPATIENT)
Dept: ONCOLOGY | Age: 84
End: 2024-03-25

## 2024-04-11 ENCOUNTER — OFFICE VISIT (OUTPATIENT)
Dept: ONCOLOGY | Age: 84
End: 2024-04-11
Payer: MEDICARE

## 2024-04-11 ENCOUNTER — HOSPITAL ENCOUNTER (OUTPATIENT)
Dept: INFUSION THERAPY | Age: 84
Discharge: HOME | End: 2024-04-11
Payer: MEDICARE

## 2024-04-11 VITALS
OXYGEN SATURATION: 100 % | HEIGHT: 65 IN | WEIGHT: 94.2 LBS | SYSTOLIC BLOOD PRESSURE: 131 MMHG | BODY MASS INDEX: 15.7 KG/M2 | RESPIRATION RATE: 16 BRPM | TEMPERATURE: 97.5 F | DIASTOLIC BLOOD PRESSURE: 71 MMHG | HEART RATE: 96 BPM

## 2024-04-11 DIAGNOSIS — D64.9 ANEMIA, UNSPECIFIED TYPE: Primary | ICD-10-CM

## 2024-04-11 DIAGNOSIS — D64.9 ANEMIA, UNSPECIFIED TYPE: ICD-10-CM

## 2024-04-11 LAB
ALBUMIN SERPL-MCNC: 4.4 GM/DL (ref 3.4–5)
ALP BLD-CCNC: 91 IU/L (ref 40–129)
ALT SERPL-CCNC: 18 U/L (ref 10–40)
ANION GAP SERPL CALCULATED.3IONS-SCNC: 12 MMOL/L (ref 7–16)
AST SERPL-CCNC: 21 IU/L (ref 15–37)
BASOPHILS ABSOLUTE: 0 K/CU MM
BASOPHILS RELATIVE PERCENT: 0.5 % (ref 0–1)
BILIRUB SERPL-MCNC: 0.4 MG/DL (ref 0–1)
BUN SERPL-MCNC: 30 MG/DL (ref 6–23)
CALCIUM SERPL-MCNC: 9.3 MG/DL (ref 8.3–10.6)
CHLORIDE BLD-SCNC: 104 MMOL/L (ref 99–110)
CO2: 27 MMOL/L (ref 21–32)
CREAT SERPL-MCNC: 1.8 MG/DL (ref 0.6–1.1)
DIFFERENTIAL TYPE: ABNORMAL
EOSINOPHILS ABSOLUTE: 0.2 K/CU MM
EOSINOPHILS RELATIVE PERCENT: 2.5 % (ref 0–3)
ERYTHROCYTE SEDIMENTATION RATE: 22 MM/HR (ref 0–30)
FERRITIN: 695 NG/ML (ref 15–150)
FOLATE SERPL-MCNC: 14.6 NG/ML (ref 3.1–17.5)
GFR SERPL CREATININE-BSD FRML MDRD: 28 ML/MIN/1.73M2
GLUCOSE SERPL-MCNC: 112 MG/DL (ref 70–99)
HCT VFR BLD CALC: 32.7 % (ref 37–47)
HEMOGLOBIN: 9.8 GM/DL (ref 12.5–16)
IMMATURE NEUTROPHIL %: 0.3 % (ref 0–0.43)
IRON: 66 UG/DL (ref 37–145)
LACTATE DEHYDROGENASE: 301 IU/L (ref 120–246)
LYMPHOCYTES ABSOLUTE: 1 K/CU MM
LYMPHOCYTES RELATIVE PERCENT: 14.2 % (ref 24–44)
MCH RBC QN AUTO: 29.4 PG (ref 27–31)
MCHC RBC AUTO-ENTMCNC: 30 % (ref 32–36)
MCV RBC AUTO: 98.2 FL (ref 78–100)
MONOCYTES ABSOLUTE: 0.7 K/CU MM
MONOCYTES RELATIVE PERCENT: 8.9 % (ref 0–4)
NEUTROPHILS RELATIVE PERCENT: 73.6 % (ref 36–66)
NUCLEATED RBC %: 0 %
PCT TRANSFERRIN: 26 % (ref 10–44)
PDW BLD-RTO: 13.3 % (ref 11.7–14.9)
PLATELET # BLD: 254 K/CU MM (ref 140–440)
PMV BLD AUTO: 11.6 FL (ref 7.5–11.1)
POTASSIUM SERPL-SCNC: 4.5 MMOL/L (ref 3.5–5.1)
RBC # BLD: 3.33 M/CU MM (ref 4.2–5.4)
RETICULOCYTE COUNT PCT: 0.9 % (ref 0.2–2.2)
SEGMENTED NEUTROPHILS ABSOLUTE COUNT: 5.4 K/CU MM
SODIUM BLD-SCNC: 143 MMOL/L (ref 135–145)
TOTAL IMMATURE NEUTOROPHIL: 0.02 K/CU MM
TOTAL IRON BINDING CAPACITY: 255 UG/DL (ref 250–450)
TOTAL NUCLEATED RBC: 0 K/CU MM
TOTAL PROTEIN: 7.4 GM/DL (ref 6.4–8.2)
TOTAL PROTEIN: 7.4 GM/DL (ref 6.4–8.2)
TOTAL RETICULOCYTE COUNT: 0.03 K/CU MM
TSH SERPL DL<=0.005 MIU/L-ACNC: 2.32 UIU/ML (ref 0.27–4.2)
UNSATURATED IRON BINDING CAPACITY: 189 UG/DL (ref 110–370)
VITAMIN B-12: 716.8 PG/ML (ref 211–911)
WBC # BLD: 7.3 K/CU MM (ref 4–10.5)

## 2024-04-11 PROCEDURE — 80053 COMPREHEN METABOLIC PANEL: CPT

## 2024-04-11 PROCEDURE — 85652 RBC SED RATE AUTOMATED: CPT

## 2024-04-11 PROCEDURE — 84436 ASSAY OF TOTAL THYROXINE: CPT

## 2024-04-11 PROCEDURE — 83550 IRON BINDING TEST: CPT

## 2024-04-11 PROCEDURE — 83010 ASSAY OF HAPTOGLOBIN QUANT: CPT

## 2024-04-11 PROCEDURE — 99211 OFF/OP EST MAY X REQ PHY/QHP: CPT

## 2024-04-11 PROCEDURE — 84155 ASSAY OF PROTEIN SERUM: CPT

## 2024-04-11 PROCEDURE — 84443 ASSAY THYROID STIM HORMONE: CPT

## 2024-04-11 PROCEDURE — 83540 ASSAY OF IRON: CPT

## 2024-04-11 PROCEDURE — 85025 COMPLETE CBC W/AUTO DIFF WBC: CPT

## 2024-04-11 PROCEDURE — 82607 VITAMIN B-12: CPT

## 2024-04-11 PROCEDURE — 85045 AUTOMATED RETICULOCYTE COUNT: CPT

## 2024-04-11 PROCEDURE — 84165 PROTEIN E-PHORESIS SERUM: CPT

## 2024-04-11 PROCEDURE — 82746 ASSAY OF FOLIC ACID SERUM: CPT

## 2024-04-11 PROCEDURE — 82728 ASSAY OF FERRITIN: CPT

## 2024-04-11 PROCEDURE — 99204 OFFICE O/P NEW MOD 45 MIN: CPT | Performed by: INTERNAL MEDICINE

## 2024-04-11 PROCEDURE — 1123F ACP DISCUSS/DSCN MKR DOCD: CPT | Performed by: INTERNAL MEDICINE

## 2024-04-11 PROCEDURE — 83615 LACTATE (LD) (LDH) ENZYME: CPT

## 2024-04-11 PROCEDURE — 36415 COLL VENOUS BLD VENIPUNCTURE: CPT

## 2024-04-11 ASSESSMENT — PATIENT HEALTH QUESTIONNAIRE - PHQ9
SUM OF ALL RESPONSES TO PHQ9 QUESTIONS 1 & 2: 0
1. LITTLE INTEREST OR PLEASURE IN DOING THINGS: NOT AT ALL
SUM OF ALL RESPONSES TO PHQ QUESTIONS 1-9: 0
2. FEELING DOWN, DEPRESSED OR HOPELESS: NOT AT ALL
SUM OF ALL RESPONSES TO PHQ QUESTIONS 1-9: 0

## 2024-04-11 NOTE — PROGRESS NOTES
Patient Name:  Luci Coleman  Patient :  1940  Patient MRN:  [id number]     Primary Oncologist: Humaira Potts MD  Referring Provider: Deon Kessler     Date of Service: 2024      Reason for Consult:  Anemia      Chief Complaint:    Chief Complaint   Patient presents with    Follow-up       Encounter Diagnosis   Name Primary?    Anemia, unspecified type Yes        HPI:   3/13/24: Initial [facility] visit:Luci Coleman is a 83 y.o. female with a history of mitral regurgitation, tricuspid regurgitation, aortic stenosis (s/p TAVR 22), HFpEF, atrial fib/flutter (Eliquis), nonobstructive CAD, HTN, polymyalgia rheumatica, anemia, and hypothyroidism currently hospitalized for decompensated heart failure. Cardiology is following and managing diuresis as well as Nephrology. They are allowing for Cr elevation with diuresis in setting of likely cardiorenal syndrome.      Normocytic anemia with Hgb 6.8 on presentation with Hct 22, MCV 98, MCHC 29, RDW 12. She is s/p 1u PRBC, now stable at 7.9. Previous baseline ~9-10 since at least May 2022,  has been as high as 11.4 however appears to be outlier in trend from labs locally and per outside records. Had anemia workup at [facility] for similar results in 2023 which ruled out hemolysis at that time. Has a history of UGIB in 2023 (contrast extravasation noted on CT) at which time she had candida esophagitis. FOBT x 1 was negative this admission. 3/12/2024 , Retic 1.7%, B12 388, Folate pending. 3/11/2024 TIBC 208, Tsat 11%, no ferritin drawn.  On admission T bili 0.6, LFT otherwise unremarkable. Cr 1.4 from baseline 1.1. TSH was elevated with normal FT4. WBC 7.3, Plt 346k. Received parenteral iron 200mg yesterday and today.      On exam daughter-in-law is at bedside with patient's permission, pt states she is forgetful.  Feeling improved since admission overall.  Ongoing fatigue.  Appetite is poor however trialed clear Ensure which she

## 2024-04-11 NOTE — PROGRESS NOTES
MA Rooming Questions  Patient: Luci Coleman  MRN: [id number]    Date: 4/11/2024        New Patient        5. Did the patient have a depression screening completed today? Yes    No data recorded     PHQ-9 Given to (if applicable):               PHQ-9 Score (if applicable):                     [] Positive     []  Negative              Does question #9 need addressed (if applicable)                     [] Yes    []  No               Kim Oviedo MA

## 2024-04-12 ENCOUNTER — CLINICAL DOCUMENTATION (OUTPATIENT)
Dept: ONCOLOGY | Age: 84
End: 2024-04-12

## 2024-04-12 NOTE — PROGRESS NOTES
Lab results from 04/11/2024 reviewed. Ferritin elevated (695). Physician recommending that patient stop taking oral iron. Attempted to call patient @ [phone] to notify; however, no answer. VM left with this RN direct number should patient have any questions.

## 2024-04-14 LAB
HAPTOGLOB SERPL-MCNC: 241 MG/DL (ref 30–200)
T4 SERPL-MCNC: 9.01 UG/DL (ref 4.5–11.7)

## 2024-04-16 LAB
ALBUMIN SERPL ELPH-MCNC: 3.9 GM/DL (ref 3.2–5.6)
ALPHA-1-GLOBULIN: 0.3 GM/DL (ref 0.1–0.4)
ALPHA-2-GLOBULIN: 0.9 GM/DL (ref 0.4–1.2)
BETA GLOBULIN: 1 GM/DL (ref 0.5–1.3)
GAMMA GLOBULIN: 1.3 GM/DL (ref 0.5–1.6)
SPEP INTERPRETATION: NORMAL
TOTAL PROTEIN: 7.4 GM/DL (ref 6.4–8.2)

## 2024-04-18 ENCOUNTER — OFFICE VISIT (OUTPATIENT)
Dept: GASTROENTEROLOGY | Age: 84
End: 2024-04-18
Payer: MEDICARE

## 2024-04-18 VITALS
HEIGHT: 65 IN | BODY MASS INDEX: 15.83 KG/M2 | DIASTOLIC BLOOD PRESSURE: 80 MMHG | SYSTOLIC BLOOD PRESSURE: 130 MMHG | WEIGHT: 95 LBS

## 2024-04-18 DIAGNOSIS — Z86.010 HISTORY OF COLON POLYPS: ICD-10-CM

## 2024-04-18 DIAGNOSIS — D64.9 ANEMIA, UNSPECIFIED TYPE: Primary | ICD-10-CM

## 2024-04-18 DIAGNOSIS — K21.9 GASTROESOPHAGEAL REFLUX DISEASE WITHOUT ESOPHAGITIS: ICD-10-CM

## 2024-04-18 PROCEDURE — 99215 OFFICE O/P EST HI 40 MIN: CPT | Performed by: NURSE PRACTITIONER

## 2024-04-18 PROCEDURE — 1123F ACP DISCUSS/DSCN MKR DOCD: CPT | Performed by: NURSE PRACTITIONER

## 2024-04-18 RX ORDER — POLYETHYLENE GLYCOL 3350, SODIUM SULFATE ANHYDROUS, SODIUM BICARBONATE, SODIUM CHLORIDE, POTASSIUM CHLORIDE 236; 22.74; 6.74; 5.86; 2.97 G/4L; G/4L; G/4L; G/4L; G/4L
4 POWDER, FOR SOLUTION ORAL ONCE
Qty: 4000 ML | Refills: 0 | Status: SHIPPED | OUTPATIENT
Start: 2024-04-18 | End: 2024-04-18

## 2024-04-18 ASSESSMENT — ENCOUNTER SYMPTOMS
DIARRHEA: 0
COLOR CHANGE: 0
SHORTNESS OF BREATH: 0
VOMITING: 0
COUGH: 0
BLOOD IN STOOL: 0
NAUSEA: 0
ABDOMINAL PAIN: 0
PHOTOPHOBIA: 0
CONSTIPATION: 0
BACK PAIN: 1
EYE PAIN: 0
WHEEZING: 0

## 2024-04-18 NOTE — PROGRESS NOTES
0.270 - 4.20 uIu/ml Final    Comment:         Patients with high levels of Biotin intake (ie >5mg/day) may have falsely decreased TSHS   levels.  Samples collected within 8 hours of Biotin intake may require additional information for   diagnosis.      T4, Total 04/11/2024 9.01  4.50 - 11.70 ug/dL Final    Comment: (NOTE)  Performed By: [facility]  [address] [place] [id number]  : Yoshi Hayden MD, PhD  CLIA Number: [id number]       04/11/2024 301 (H)  120 - 246 IU/L Final   No results displayed because visit has over 200 results.          Assessment and Plan:  1.  Will plan for a colonoscopy with MAC anesthesia.  The patient was informed of the risks and benefits of the procedure.  2.  Anemia most likely of chronic disease; currently the patient did not have signs of GI bleeding.  Recommend follow up with Dr. Potts her Hematologist. Recommend periodic monitoring of H&H.  She denies blood in her stools or melena.  EGD doen 2023 showed candida esophagitis that was treated with no current dysphagia.  Will plan for colonoscopy to rule out GI source for bleeding.     3.  GERD that is stable without dysphagia or odynophagia.  The patient was encouraged to continue taking Protonix daily for treatment .  Recommend continue with anti-reflux measures and avoid foods that trigger.  4.  History of colon polyps will order colonoscopy for colorectal cancer surveillance.  5.  Further recommendations for follow-up will be determined after the colonoscopy has been completed.

## 2024-05-13 PROBLEM — N18.32 STAGE 3B CHRONIC KIDNEY DISEASE (HCC): Status: ACTIVE | Noted: 2024-05-13

## 2024-05-13 PROBLEM — N17.0 ACUTE RENAL FAILURE WITH TUBULAR NECROSIS (HCC): Status: ACTIVE | Noted: 2024-05-13

## 2024-06-03 RX ORDER — ACETAMINOPHEN 500 MG
500 TABLET ORAL EVERY 6 HOURS PRN
COMMUNITY

## 2024-06-03 NOTE — PROGRESS NOTES
Procedure @ [facility] on 6/10/24 you will be called 6/7/24 with times    NOTHING TO EAT OR DRINK AFTER MIDNIGHT DAY OF SURGERY  FOLLOW OFFICE INSTRUCTIONS FOR ANY NECESSARY PREP     1. Enter thru the hospital main entrance on day of surgery, check in at the Information Desk. If you arrive prior to 6:00am, enter thru the ER entrance.    2. Follow the directions as prescribed by the doctor for your procedure and medications.         Morning of surgery take: Synthroid, Metoprolol, Protonix with sips of water          Stop vitamins, supplements and NSAIDS:  6/3/2024    3. Check with your Doctor regarding stopping blood thinners and follow their instructions.    4. Do not smoke, vape or use chewing tobacco morning of surgery. Do not drink any alcoholic beverages 24 hours prior to surgery.       This includes NA Beer. No street drugs 7 days prior to surgery.    5. If you have dentures, contacts of glasses they will be removed before going to the OR; please bring a case.    6. Please bring picture ID, insurance card, paperwork from the doctor’s office (H & P, Consent, & card for implantable devices).    7. Take a shower with an antibacterial soap the night before surgery and the morning of surgery. Do not put anything on your skin      After your morning shower.    8. You will need a responsible adult to drive you home and check on you after surgery.

## 2024-06-04 ENCOUNTER — PREP FOR PROCEDURE (OUTPATIENT)
Dept: GASTROENTEROLOGY | Age: 84
End: 2024-06-04

## 2024-06-04 RX ORDER — SODIUM CHLORIDE, SODIUM LACTATE, POTASSIUM CHLORIDE, CALCIUM CHLORIDE 600; 310; 30; 20 MG/100ML; MG/100ML; MG/100ML; MG/100ML
INJECTION, SOLUTION INTRAVENOUS CONTINUOUS
Status: CANCELLED | OUTPATIENT
Start: 2024-06-04

## 2024-06-04 RX ORDER — SODIUM CHLORIDE 0.9 % (FLUSH) 0.9 %
5-40 SYRINGE (ML) INJECTION PRN
Status: CANCELLED | OUTPATIENT
Start: 2024-06-04

## 2024-06-04 RX ORDER — SODIUM CHLORIDE 0.9 % (FLUSH) 0.9 %
5-40 SYRINGE (ML) INJECTION EVERY 12 HOURS SCHEDULED
Status: CANCELLED | OUTPATIENT
Start: 2024-06-04

## 2024-06-04 RX ORDER — SODIUM CHLORIDE 9 MG/ML
25 INJECTION, SOLUTION INTRAVENOUS PRN
Status: CANCELLED | OUTPATIENT
Start: 2024-06-04

## 2024-06-07 ENCOUNTER — ANESTHESIA EVENT (OUTPATIENT)
Dept: ENDOSCOPY | Age: 84
End: 2024-06-07
Payer: MEDICARE

## 2024-06-07 NOTE — ANESTHESIA PRE PROCEDURE
ROS  (+) depression/anxiety             GI/Hepatic/Renal:   (+) renal disease: CRI         ROS comment: GIB.   Endo/Other:    (+) blood dyscrasia: anticoagulation therapy and anemia, arthritis:..                 Abdominal: normal exam            Vascular: negative vascular ROS.         Other Findings:         Anesthesia Plan      MAC     ASA 4       Induction: intravenous.      Anesthetic plan and risks discussed with patient.                      Fred Muhammad, APRN - CRNA   6/7/2024         Pre Anesthesia Assessment complete. Chart reviewed on 6/7/2024

## 2024-06-07 NOTE — PROGRESS NOTES
6/7/24 - .Notified patient surgery @ [facility] on  6/10/24 @ 1300, arrival 1115. NPO status and morning medications reviewed. Understanding verbalized.

## 2024-06-09 NOTE — H&P
I have examined the patient within 24 hours  before the procedure and there is no change in the previous history and physical exam,which has been reviewed.There is a history of sleep apnea.There has been no  previous adverse experience with sedation/anesthesia. There is no increased risk for aspiration of gastric contents.  The patient has been instructed that all resuscitative measures (during the operative and immediate perioperative period) will be instituted in the unlikely event that they will be needed.The patient has no pertinent past surgical or FH other than listed in the original H&P.     ASA Class: 4  AIRWAY Class: 1     Consent form signed, witnessed and in soft chart

## 2024-06-10 ENCOUNTER — ANESTHESIA (OUTPATIENT)
Dept: ENDOSCOPY | Age: 84
End: 2024-06-10
Payer: MEDICARE

## 2024-06-10 ENCOUNTER — HOSPITAL ENCOUNTER (OUTPATIENT)
Age: 84
Setting detail: OUTPATIENT SURGERY
Discharge: HOME | End: 2024-06-10
Attending: SPECIALIST | Admitting: SPECIALIST
Payer: MEDICARE

## 2024-06-10 VITALS
WEIGHT: 95 LBS | HEIGHT: 64 IN | TEMPERATURE: 97.9 F | HEART RATE: 48 BPM | SYSTOLIC BLOOD PRESSURE: 152 MMHG | RESPIRATION RATE: 16 BRPM | BODY MASS INDEX: 16.22 KG/M2 | OXYGEN SATURATION: 100 % | DIASTOLIC BLOOD PRESSURE: 66 MMHG

## 2024-06-10 DIAGNOSIS — Z86.010 HX OF COLONIC POLYPS: ICD-10-CM

## 2024-06-10 DIAGNOSIS — D64.9 ANEMIA, UNSPECIFIED TYPE: ICD-10-CM

## 2024-06-10 PROBLEM — D12.2 BENIGN NEOPLASM OF ASCENDING COLON: Status: ACTIVE | Noted: 2024-06-10

## 2024-06-10 PROCEDURE — 2709999900 HC NON-CHARGEABLE SUPPLY: Performed by: SPECIALIST

## 2024-06-10 PROCEDURE — 6360000002 HC RX W HCPCS: Performed by: NURSE ANESTHETIST, CERTIFIED REGISTERED

## 2024-06-10 PROCEDURE — 2580000003 HC RX 258: Performed by: ANESTHESIOLOGY

## 2024-06-10 PROCEDURE — 2500000003 HC RX 250 WO HCPCS: Performed by: SPECIALIST

## 2024-06-10 PROCEDURE — 7100000011 HC PHASE II RECOVERY - ADDTL 15 MIN: Performed by: SPECIALIST

## 2024-06-10 PROCEDURE — 3609010200 HC COLONOSCOPY ABLATION TUMOR POLYP/OTHER LES: Performed by: SPECIALIST

## 2024-06-10 PROCEDURE — 3700000001 HC ADD 15 MINUTES (ANESTHESIA): Performed by: SPECIALIST

## 2024-06-10 PROCEDURE — 93005 ELECTROCARDIOGRAM TRACING: CPT | Performed by: ANESTHESIOLOGY

## 2024-06-10 PROCEDURE — 3700000000 HC ANESTHESIA ATTENDED CARE: Performed by: SPECIALIST

## 2024-06-10 PROCEDURE — 88305 TISSUE EXAM BY PATHOLOGIST: CPT | Performed by: PATHOLOGY

## 2024-06-10 PROCEDURE — 7100000010 HC PHASE II RECOVERY - FIRST 15 MIN: Performed by: SPECIALIST

## 2024-06-10 PROCEDURE — 45390 COLONOSCOPY W/RESECTION: CPT | Performed by: SPECIALIST

## 2024-06-10 PROCEDURE — C1889 IMPLANT/INSERT DEVICE, NOC: HCPCS | Performed by: SPECIALIST

## 2024-06-10 DEVICE — CLIP
Type: IMPLANTABLE DEVICE | Status: FUNCTIONAL
Brand: RESOLUTION 360™ ULTRA CLIP

## 2024-06-10 DEVICE — CLIP LIG L235CM RESOL 360 BX/20: Type: IMPLANTABLE DEVICE | Status: FUNCTIONAL

## 2024-06-10 DEVICE — INSTINCT PLUS ENDOSCOPIC CLIPPING DEVICE
Type: IMPLANTABLE DEVICE | Status: FUNCTIONAL
Brand: INSTINCT

## 2024-06-10 RX ORDER — SODIUM CHLORIDE 9 MG/ML
25 INJECTION, SOLUTION INTRAVENOUS PRN
Status: DISCONTINUED | OUTPATIENT
Start: 2024-06-10 | End: 2024-06-10 | Stop reason: HOSPADM

## 2024-06-10 RX ORDER — PROPOFOL 10 MG/ML
INJECTION, EMULSION INTRAVENOUS PRN
Status: DISCONTINUED | OUTPATIENT
Start: 2024-06-10 | End: 2024-06-10 | Stop reason: SDUPTHER

## 2024-06-10 RX ORDER — SODIUM CHLORIDE 0.9 % (FLUSH) 0.9 %
5-40 SYRINGE (ML) INJECTION EVERY 12 HOURS SCHEDULED
Status: DISCONTINUED | OUTPATIENT
Start: 2024-06-10 | End: 2024-06-10 | Stop reason: HOSPADM

## 2024-06-10 RX ORDER — SODIUM CHLORIDE, SODIUM LACTATE, POTASSIUM CHLORIDE, CALCIUM CHLORIDE 600; 310; 30; 20 MG/100ML; MG/100ML; MG/100ML; MG/100ML
INJECTION, SOLUTION INTRAVENOUS CONTINUOUS
Status: DISCONTINUED | OUTPATIENT
Start: 2024-06-10 | End: 2024-06-10 | Stop reason: HOSPADM

## 2024-06-10 RX ORDER — SODIUM CHLORIDE 0.9 % (FLUSH) 0.9 %
5-40 SYRINGE (ML) INJECTION PRN
Status: DISCONTINUED | OUTPATIENT
Start: 2024-06-10 | End: 2024-06-10 | Stop reason: HOSPADM

## 2024-06-10 RX ORDER — LIDOCAINE HYDROCHLORIDE 20 MG/ML
INJECTION, SOLUTION INTRAVENOUS PRN
Status: DISCONTINUED | OUTPATIENT
Start: 2024-06-10 | End: 2024-06-10 | Stop reason: SDUPTHER

## 2024-06-10 RX ADMIN — LIDOCAINE HYDROCHLORIDE 80 MG: 20 INJECTION, SOLUTION INTRAVENOUS at 13:28

## 2024-06-10 RX ADMIN — SODIUM CHLORIDE, POTASSIUM CHLORIDE, SODIUM LACTATE AND CALCIUM CHLORIDE: 600; 310; 30; 20 INJECTION, SOLUTION INTRAVENOUS at 12:23

## 2024-06-10 RX ADMIN — PROPOFOL 300 MG: 10 INJECTION, EMULSION INTRAVENOUS at 13:28

## 2024-06-10 ASSESSMENT — PAIN - FUNCTIONAL ASSESSMENT: PAIN_FUNCTIONAL_ASSESSMENT: 0-10

## 2024-06-10 ASSESSMENT — PAIN SCALES - GENERAL: PAINLEVEL_OUTOF10: 0

## 2024-06-10 NOTE — PROGRESS NOTES
1505 Pt received from Christine and report received from Zenaida MAIER. Pt denies c/o. Pt given Diet Pepsi. Pt abdomen soft and non tender. Daughter at bedside. Call light in reach.1520 In to check on pt. Pt denies c/o or needs. Call light in reach.  1530 In to check on pt. Pt up to restroom with assist. Pt weak. Pt voided without difficulty. Pt back to room. Pt denies c/o or needs. Call light in reach. Pt HR 45-55. Pt asymptomatic.1550 Called DR. Jimenez report on pt HR still 45-55. EKG ordered. 1606 EKG done.1600 Discharge instruction given to pt and daughter. Both verbalized understanding of instructions.  1615 EKG called to Dr. Jimenez. Order received to discharge pt home. 1620 Pt up to side of bed. Pt tolerated well and ready to get dressed to go home. Call light in reach. Daughter at bedside to assist pt to dress. Pt denies c/o or needs. 1630 Pt discharged to home per wheelchair to daughters awaiting vehicle to drive pt home.

## 2024-06-10 NOTE — ANESTHESIA POSTPROCEDURE EVALUATION
Department of Anesthesiology  Postprocedure Note    Patient: Luci Coleman  MRN: [id number]  YOB: 1940  Date of evaluation: 6/10/2024    Procedure Summary       Date: 06/10/24 Room / Location: [place] [address] / [facility]    Anesthesia Start: 1315 Anesthesia Stop: 1456    Procedure: COLONOSCOPY W/ ENDOSCOPIC MUCOSAL RESECTION USING 7CC BLUE BOOST, HOT SNARE, SOFT COAG, AND CLIP PLACEMENT X5 AT 1.5 CM UPPER ASCENDING POLYPECYTOMY SITE AND 10CC BLUE BOOST, HOT SNARE, AND CLIP PLACEMENT X5 AT THE 2CM UPPER ASCENDING SITE Diagnosis:       Anemia, unspecified type      Hx of colonic polyps      (Anemia, unspecified type [D64.9])      (Hx of colonic polyps [Z86.010])    Surgeons: Kalia Estrada MD Responsible Provider: Paresh Gaffney APRN - CRNA    Anesthesia Type: MAC ASA Status: 4            Anesthesia Type: MAC    Harshad Phase I:      Harshad Phase II:      Anesthesia Post Evaluation    Patient location during evaluation: bedside  Patient participation: complete - patient participated  Level of consciousness: sleepy but conscious  Pain score: 0  Airway patency: patent  Nausea & Vomiting: no nausea and no vomiting  Cardiovascular status: blood pressure returned to baseline and hemodynamically stable  Respiratory status: acceptable  Hydration status: stable    No notable events documented.

## 2024-06-10 NOTE — DISCHARGE INSTRUCTIONS
COLONOSCOPY    _JONNATHAN_______________________________    OFFICE [id number]__[phone]______________________    FOLLOW UP APPOINTMENT IN __ _6____months. CALL DR. DE SANTIAGO's OFFICE IN ONE WEEK FOR POLYP BIOPSY RESULTS. LEAVE MESSAGE FOR DR. DE SANTIAGO TO CALL YOU BACK WITH RESULTS.     REPEAT PROCEDURE IN ___6___months.    TEST ORDERED: NONE     What to expect at home:  Your Recovery   Your doctor will tell you when you can eat and do your other usual activities Your doctor will talk to you about when you will need your next colonoscopy. Your doctor can help you decide how often you need to be checked. This will depend on the results of your test and your risk for colorectal cancer.  After the test, you may be bloated or have gas pains. You may need to pass gas. If a biopsy was done or a polyp was removed, you may have streaks of blood in your stool (feces) for a few days.  This care sheet gives you a general idea about how long it will take for you to recover. But each person recovers at a different pace. Follow the steps below to get better as quickly as possible.  How can you care for yourself at home?  Activity  Rest when you feel tired.  Diet  Follow your doctor's directions for eating.  Unless your doctor has told you not to, drink plenty of fluids. This helps to replace the fluids that were lost during the colon prep.  DO NOT DRINK ALCOHOL.  Medicines  Your doctor will tell you if and when you can restart your medicines. He or she will also give you instructions about taking any new medicines.  If you take blood thinners, such as warfarin (Coumadin), clopidogrel (Plavix), or aspirin, be sure to talk to your doctor. He or she will tell you if and when to start taking those medicines again. Make sure that you understand exactly what your doctor wants you to do.  If polyps were removed or a biopsy was done during the test, your doctor may tell you not to take aspirin or other anti-inflammatory medicines for a

## 2024-06-10 NOTE — BRIEF OP NOTE
BRIEF COLONOSCOPY REPORT:   The original colonoscopy report with photos can be found by going to \"chart review\" then \"notes\" then \"colonoscopy\" then \"scan....\"    Impression:         - pan-diverticulosis noted but concentrated and severe within the sigmoid         - A 15 mm sessile polyp was found in the upper ascending colon and removed by EMR:          - A 20 mm sessile polyp was found just several cm distal to the above polyp.  It was also removed by EMR:          -  Internal hemorrhoids.        Suggestion:         -  repeat colonoscopy in 6 months

## 2024-06-12 LAB
EKG ATRIAL RATE: 47 BPM
EKG DIAGNOSIS: NORMAL
EKG P AXIS: 58 DEGREES
EKG P-R INTERVAL: 218 MS
EKG Q-T INTERVAL: 562 MS
EKG QRS DURATION: 162 MS
EKG QTC CALCULATION (BAZETT): 497 MS
EKG R AXIS: -54 DEGREES
EKG T AXIS: 73 DEGREES
EKG VENTRICULAR RATE: 47 BPM

## 2024-06-17 ENCOUNTER — TELEPHONE (OUTPATIENT)
Dept: GASTROENTEROLOGY | Age: 84
End: 2024-06-17

## 2024-06-17 NOTE — TELEPHONE ENCOUNTER
Pts daughter call for results of colonoscopy. Per report repeat in 6 mos. Pts daughter was notified.

## 2024-07-22 ENCOUNTER — HOSPITAL ENCOUNTER (OUTPATIENT)
Dept: INFUSION THERAPY | Age: 84
Discharge: HOME | End: 2024-07-22
Payer: MEDICARE

## 2024-07-22 DIAGNOSIS — D64.9 ANEMIA, UNSPECIFIED TYPE: ICD-10-CM

## 2024-07-22 LAB
ALBUMIN SERPL-MCNC: 4.4 GM/DL (ref 3.4–5)
ALP BLD-CCNC: 83 IU/L (ref 40–129)
ALT SERPL-CCNC: 15 U/L (ref 10–40)
ANION GAP SERPL CALCULATED.3IONS-SCNC: 14 MMOL/L (ref 7–16)
AST SERPL-CCNC: 20 IU/L (ref 15–37)
BASOPHILS ABSOLUTE: 0 K/CU MM
BASOPHILS RELATIVE PERCENT: 0.6 % (ref 0–1)
BILIRUB SERPL-MCNC: 0.3 MG/DL (ref 0–1)
BUN SERPL-MCNC: 37 MG/DL (ref 6–23)
CALCIUM SERPL-MCNC: 9.7 MG/DL (ref 8.3–10.6)
CHLORIDE BLD-SCNC: 106 MMOL/L (ref 99–110)
CO2: 22 MMOL/L (ref 21–32)
CREAT SERPL-MCNC: 1.8 MG/DL (ref 0.6–1.1)
DIFFERENTIAL TYPE: ABNORMAL
EOSINOPHILS ABSOLUTE: 0.2 K/CU MM
EOSINOPHILS RELATIVE PERCENT: 3.3 % (ref 0–3)
FERRITIN: 671 NG/ML (ref 15–150)
FOLATE SERPL-MCNC: 14.7 NG/ML (ref 3.1–17.5)
GFR, ESTIMATED: 28 ML/MIN/1.73M2
GLUCOSE SERPL-MCNC: 110 MG/DL (ref 70–99)
HCT VFR BLD CALC: 31.4 % (ref 37–47)
HEMOGLOBIN: 9.9 GM/DL (ref 12.5–16)
IRON: 72 UG/DL (ref 37–145)
LYMPHOCYTES ABSOLUTE: 1.3 K/CU MM
LYMPHOCYTES RELATIVE PERCENT: 19.8 % (ref 24–44)
MCH RBC QN AUTO: 29.9 PG (ref 27–31)
MCHC RBC AUTO-ENTMCNC: 31.5 % (ref 32–36)
MCV RBC AUTO: 94.9 FL (ref 78–100)
MONOCYTES ABSOLUTE: 0.9 K/CU MM
MONOCYTES RELATIVE PERCENT: 13.9 % (ref 0–4)
NEUTROPHILS ABSOLUTE: 4.1 K/CU MM
NEUTROPHILS RELATIVE PERCENT: 62.4 % (ref 36–66)
PCT TRANSFERRIN: 30 % (ref 10–44)
PDW BLD-RTO: 13.8 % (ref 11.7–14.9)
PLATELET # BLD: 266 K/CU MM (ref 140–440)
PMV BLD AUTO: 10.4 FL (ref 7.5–11.1)
POTASSIUM SERPL-SCNC: 4.4 MMOL/L (ref 3.5–5.1)
RBC # BLD: 3.31 M/CU MM (ref 4.2–5.4)
SODIUM BLD-SCNC: 142 MMOL/L (ref 135–145)
TOTAL IRON BINDING CAPACITY: 239 UG/DL (ref 250–450)
TOTAL PROTEIN: 7.3 GM/DL (ref 6.4–8.2)
UNSATURATED IRON BINDING CAPACITY: 167 UG/DL (ref 110–370)
VITAMIN B-12: 1090 PG/ML (ref 211–911)
WBC # BLD: 6.6 K/CU MM (ref 4–10.5)

## 2024-07-22 PROCEDURE — 85025 COMPLETE CBC W/AUTO DIFF WBC: CPT

## 2024-07-22 PROCEDURE — 82607 VITAMIN B-12: CPT

## 2024-07-22 PROCEDURE — 82746 ASSAY OF FOLIC ACID SERUM: CPT

## 2024-07-22 PROCEDURE — 36415 COLL VENOUS BLD VENIPUNCTURE: CPT

## 2024-07-22 PROCEDURE — 82728 ASSAY OF FERRITIN: CPT

## 2024-07-22 PROCEDURE — 83540 ASSAY OF IRON: CPT

## 2024-07-22 PROCEDURE — 83550 IRON BINDING TEST: CPT

## 2024-07-22 PROCEDURE — 80053 COMPREHEN METABOLIC PANEL: CPT

## 2024-07-29 ENCOUNTER — HOSPITAL ENCOUNTER (OUTPATIENT)
Dept: INFUSION THERAPY | Age: 84
Discharge: HOME | End: 2024-07-29
Payer: MEDICARE

## 2024-07-29 ENCOUNTER — OFFICE VISIT (OUTPATIENT)
Dept: ONCOLOGY | Age: 84
End: 2024-07-29
Payer: MEDICARE

## 2024-07-29 VITALS
HEIGHT: 64 IN | DIASTOLIC BLOOD PRESSURE: 62 MMHG | RESPIRATION RATE: 16 BRPM | TEMPERATURE: 98 F | WEIGHT: 93 LBS | SYSTOLIC BLOOD PRESSURE: 121 MMHG | BODY MASS INDEX: 15.88 KG/M2 | OXYGEN SATURATION: 99 % | HEART RATE: 70 BPM

## 2024-07-29 DIAGNOSIS — D64.9 ANEMIA, UNSPECIFIED TYPE: Primary | ICD-10-CM

## 2024-07-29 PROCEDURE — 99213 OFFICE O/P EST LOW 20 MIN: CPT | Performed by: INTERNAL MEDICINE

## 2024-07-29 PROCEDURE — 99211 OFF/OP EST MAY X REQ PHY/QHP: CPT

## 2024-07-29 PROCEDURE — 1123F ACP DISCUSS/DSCN MKR DOCD: CPT | Performed by: INTERNAL MEDICINE

## 2024-07-29 NOTE — PROGRESS NOTES
Patient Name:  Luci Coleman  Patient :  1940  Patient MRN:  [id number]     Primary Oncologist: Humaira Potts MD  Referring Provider: Deon Kessler APRN - CNP     Date of Service: 2024      Reason for Consult:  Anemia      Chief Complaint:    Chief Complaint   Patient presents with    Follow-up       Encounter Diagnosis   Name Primary?    Anemia, unspecified type Yes        HPI:   3/13/24: Initial [facility] visit:Luci Coleman is a 83 y.o. female with a history of mitral regurgitation, tricuspid regurgitation, aortic stenosis (s/p TAVR 22), HFpEF, atrial fib/flutter (Eliquis), nonobstructive CAD, HTN, polymyalgia rheumatica, anemia, and hypothyroidism currently hospitalized for decompensated heart failure. Cardiology is following and managing diuresis as well as Nephrology. They are allowing for Cr elevation with diuresis in setting of likely cardiorenal syndrome.      Normocytic anemia with Hgb 6.8 on presentation with Hct 22, MCV 98, MCHC 29, RDW 12. She is s/p 1u PRBC, now stable at 7.9. Previous baseline ~9-10 since at least May 2022,  has been as high as 11.4 however appears to be outlier in trend from labs locally and per outside records. Had anemia workup at [facility] for similar results in 2023 which ruled out hemolysis at that time. Has a history of UGIB in 2023 (contrast extravasation noted on CT) at which time she had candida esophagitis. FOBT x 1 was negative this admission. 3/12/2024 , Retic 1.7%, B12 388, Folate pending. 3/11/2024 TIBC 208, Tsat 11%, no ferritin drawn.  On admission T bili 0.6, LFT otherwise unremarkable. Cr 1.4 from baseline 1.1. TSH was elevated with normal FT4. WBC 7.3, Plt 346k. Received parenteral iron 200mg yesterday and today.      On exam daughter-in-law is at bedside with patient's permission, pt states she is forgetful.  Feeling improved since admission overall.  Ongoing fatigue.  Appetite is poor however trialed clear

## 2024-07-29 NOTE — PROGRESS NOTES
MA Rooming Questions  Patient: Luci Coleman  MRN: [id number]    Date: 7/29/2024        1. Do you have any new issues?   no         2. Do you need any refills on medications?    no    3. Have you had any imaging done since your last visit?   Yes-Colonoscopy    4. Have you been hospitalized or seen in the emergency room since your last visit here?   no    5. Did the patient have a depression screening completed today? No    No data recorded     PHQ-9 Given to (if applicable):               PHQ-9 Score (if applicable):                     [] Positive     []  Negative              Does question #9 need addressed (if applicable)                     [] Yes    []  No               Evie Pleitez CMA

## 2024-08-23 PROBLEM — N18.4 CHRONIC KIDNEY DISEASE, STAGE IV (SEVERE) (HCC): Status: ACTIVE | Noted: 2024-08-23

## 2024-11-12 ENCOUNTER — HOSPITAL ENCOUNTER (OUTPATIENT)
Age: 84
Discharge: HOME | End: 2024-11-12
Payer: MEDICARE

## 2024-11-12 LAB
ALBUMIN SERPL-MCNC: 4.3 G/DL (ref 3.4–5)
ALBUMIN SERPL-MCNC: 4.3 G/DL (ref 3.4–5)
ALBUMIN/GLOB SERPL: 1.4 {RATIO} (ref 1.1–2.2)
ALP SERPL-CCNC: 109 U/L (ref 40–129)
ALT SERPL-CCNC: 22 U/L (ref 10–40)
ANION GAP SERPL CALCULATED.3IONS-SCNC: 12 MMOL/L (ref 9–17)
ANION GAP SERPL CALCULATED.3IONS-SCNC: 12 MMOL/L (ref 9–17)
AST SERPL-CCNC: 39 U/L (ref 15–37)
BACTERIA URNS QL MICRO: ABNORMAL
BASOPHILS # BLD: 0.07 K/UL
BASOPHILS # BLD: 0.08 K/UL
BASOPHILS NFR BLD: 1 % (ref 0–1)
BASOPHILS NFR BLD: 1 % (ref 0–1)
BILIRUB SERPL-MCNC: 0.4 MG/DL (ref 0–1)
BILIRUB UR QL STRIP: NEGATIVE
BUN SERPL-MCNC: 22 MG/DL (ref 7–20)
BUN SERPL-MCNC: 22 MG/DL (ref 7–20)
CALCIUM SERPL-MCNC: 9.3 MG/DL (ref 8.3–10.6)
CALCIUM SERPL-MCNC: 9.4 MG/DL (ref 8.3–10.6)
CASTS #/AREA URNS LPF: ABNORMAL /LPF
CHLORIDE SERPL-SCNC: 103 MMOL/L (ref 99–110)
CHLORIDE SERPL-SCNC: 103 MMOL/L (ref 99–110)
CLARITY UR: CLEAR
CO2 SERPL-SCNC: 25 MMOL/L (ref 21–32)
CO2 SERPL-SCNC: 25 MMOL/L (ref 21–32)
COLOR UR: YELLOW
CREAT SERPL-MCNC: 1.2 MG/DL (ref 0.6–1.2)
CREAT SERPL-MCNC: 1.2 MG/DL (ref 0.6–1.2)
CREAT UR-MCNC: 48 MG/DL (ref 28–217)
EOSINOPHIL # BLD: 0.25 K/UL
EOSINOPHIL # BLD: 0.26 K/UL
EOSINOPHILS RELATIVE PERCENT: 3 % (ref 0–3)
EOSINOPHILS RELATIVE PERCENT: 3 % (ref 0–3)
EPI CELLS #/AREA URNS HPF: <1 /HPF
ERYTHROCYTE [DISTWIDTH] IN BLOOD BY AUTOMATED COUNT: 13 % (ref 11.7–14.9)
ERYTHROCYTE [DISTWIDTH] IN BLOOD BY AUTOMATED COUNT: 13.2 % (ref 11.7–14.9)
FERRITIN SERPL-MCNC: 353 NG/ML (ref 15–150)
FOLATE SERPL-MCNC: 10.3 NG/ML (ref 4.8–24.2)
GFR, ESTIMATED: 44 ML/MIN/1.73M2
GFR, ESTIMATED: 44 ML/MIN/1.73M2
GLUCOSE SERPL-MCNC: 112 MG/DL (ref 74–99)
GLUCOSE SERPL-MCNC: 116 MG/DL (ref 74–99)
GLUCOSE UR STRIP-MCNC: NEGATIVE MG/DL
HCT VFR BLD AUTO: 33.2 % (ref 37–47)
HCT VFR BLD AUTO: 33.3 % (ref 37–47)
HGB BLD-MCNC: 10.1 G/DL (ref 12.5–16)
HGB BLD-MCNC: 10.1 G/DL (ref 12.5–16)
HGB UR QL STRIP.AUTO: ABNORMAL
IMM GRANULOCYTES # BLD AUTO: 0.03 K/UL
IMM GRANULOCYTES # BLD AUTO: 0.04 K/UL
IMM GRANULOCYTES NFR BLD: 0 %
IMM GRANULOCYTES NFR BLD: 0 %
IRON SATN MFR SERPL: 16 % (ref 15–50)
IRON SERPL-MCNC: 45 UG/DL (ref 37–145)
KETONES UR STRIP-MCNC: NEGATIVE MG/DL
LEUKOCYTE ESTERASE UR QL STRIP: ABNORMAL
LYMPHOCYTES NFR BLD: 1.2 K/UL
LYMPHOCYTES NFR BLD: 1.24 K/UL
LYMPHOCYTES RELATIVE PERCENT: 12 % (ref 24–44)
LYMPHOCYTES RELATIVE PERCENT: 12 % (ref 24–44)
MCH RBC QN AUTO: 29.2 PG (ref 27–31)
MCH RBC QN AUTO: 29.6 PG (ref 27–31)
MCHC RBC AUTO-ENTMCNC: 30.3 G/DL (ref 32–36)
MCHC RBC AUTO-ENTMCNC: 30.4 G/DL (ref 32–36)
MCV RBC AUTO: 96.2 FL (ref 78–100)
MCV RBC AUTO: 97.4 FL (ref 78–100)
MONOCYTES NFR BLD: 0.87 K/UL
MONOCYTES NFR BLD: 0.91 K/UL
MONOCYTES NFR BLD: 8 % (ref 0–4)
MONOCYTES NFR BLD: 9 % (ref 0–4)
MUCOUS THREADS URNS QL MICRO: ABNORMAL
NEUTROPHILS NFR BLD: 76 % (ref 36–66)
NEUTROPHILS NFR BLD: 76 % (ref 36–66)
NEUTS SEG NFR BLD: 7.72 K/UL
NEUTS SEG NFR BLD: 7.91 K/UL
NITRITE UR QL STRIP: NEGATIVE
PH UR STRIP: 6 [PH] (ref 5–8)
PHOSPHATE SERPL-MCNC: 3.3 MG/DL (ref 2.5–4.9)
PLATELET # BLD AUTO: 254 K/UL (ref 140–440)
PLATELET # BLD AUTO: 266 K/UL (ref 140–440)
PMV BLD AUTO: 11.2 FL (ref 7.5–11.1)
PMV BLD AUTO: 11.4 FL (ref 7.5–11.1)
POTASSIUM SERPL-SCNC: 3.5 MMOL/L (ref 3.5–5.1)
POTASSIUM SERPL-SCNC: 3.5 MMOL/L (ref 3.5–5.1)
PROT SERPL-MCNC: 7.6 G/DL (ref 6.4–8.2)
PROT UR STRIP-MCNC: ABNORMAL MG/DL
RBC # BLD AUTO: 3.41 M/UL (ref 4.2–5.4)
RBC # BLD AUTO: 3.46 M/UL (ref 4.2–5.4)
RBC #/AREA URNS HPF: 8 /HPF (ref 0–2)
SODIUM SERPL-SCNC: 140 MMOL/L (ref 136–145)
SODIUM SERPL-SCNC: 140 MMOL/L (ref 136–145)
SP GR UR STRIP: 1.01 (ref 1–1.03)
TIBC SERPL-MCNC: 278 UG/DL (ref 260–445)
TOTAL PROTEIN, URINE: 25 MG/DL
TRICHOMONAS #/AREA URNS HPF: ABNORMAL /[HPF]
UNSATURATED IRON BINDING CAPACITY: 233 UG/DL (ref 110–370)
URINE TOTAL PROTEIN CREATININE RATIO: 0.52 (ref 0–0.2)
UROBILINOGEN UR STRIP-ACNC: 0.2 EU/DL (ref 0–1)
VIT B12 SERPL-MCNC: 379 PG/ML (ref 211–911)
WBC #/AREA URNS HPF: 1 /HPF (ref 0–5)
WBC OTHER # BLD: 10.2 K/UL (ref 4–10.5)
WBC OTHER # BLD: 10.4 K/UL (ref 4–10.5)

## 2024-11-12 PROCEDURE — 83540 ASSAY OF IRON: CPT

## 2024-11-12 PROCEDURE — 36415 COLL VENOUS BLD VENIPUNCTURE: CPT

## 2024-11-12 PROCEDURE — 83550 IRON BINDING TEST: CPT

## 2024-11-12 PROCEDURE — 82728 ASSAY OF FERRITIN: CPT

## 2024-11-12 PROCEDURE — 80053 COMPREHEN METABOLIC PANEL: CPT

## 2024-11-12 PROCEDURE — 80048 BASIC METABOLIC PNL TOTAL CA: CPT

## 2024-11-12 PROCEDURE — 82607 VITAMIN B-12: CPT

## 2024-11-12 PROCEDURE — 82040 ASSAY OF SERUM ALBUMIN: CPT

## 2024-11-12 PROCEDURE — 84156 ASSAY OF PROTEIN URINE: CPT

## 2024-11-12 PROCEDURE — 85025 COMPLETE CBC W/AUTO DIFF WBC: CPT

## 2024-11-12 PROCEDURE — 82746 ASSAY OF FOLIC ACID SERUM: CPT

## 2024-11-12 PROCEDURE — 84100 ASSAY OF PHOSPHORUS: CPT

## 2024-11-12 PROCEDURE — 82570 ASSAY OF URINE CREATININE: CPT

## 2024-11-12 PROCEDURE — 81001 URINALYSIS AUTO W/SCOPE: CPT

## 2024-12-06 ENCOUNTER — HOSPITAL ENCOUNTER (OUTPATIENT)
Dept: INFUSION THERAPY | Age: 84
Discharge: HOME | End: 2024-12-06
Payer: MEDICARE

## 2024-12-06 ENCOUNTER — OFFICE VISIT (OUTPATIENT)
Dept: ONCOLOGY | Age: 84
End: 2024-12-06
Payer: MEDICARE

## 2024-12-06 VITALS
HEIGHT: 64 IN | HEART RATE: 63 BPM | OXYGEN SATURATION: 99 % | TEMPERATURE: 98 F | SYSTOLIC BLOOD PRESSURE: 169 MMHG | WEIGHT: 95.2 LBS | DIASTOLIC BLOOD PRESSURE: 82 MMHG | BODY MASS INDEX: 16.25 KG/M2

## 2024-12-06 DIAGNOSIS — D64.9 ANEMIA, UNSPECIFIED TYPE: Primary | ICD-10-CM

## 2024-12-06 PROCEDURE — 99213 OFFICE O/P EST LOW 20 MIN: CPT | Performed by: INTERNAL MEDICINE

## 2024-12-06 PROCEDURE — 1126F AMNT PAIN NOTED NONE PRSNT: CPT | Performed by: INTERNAL MEDICINE

## 2024-12-06 PROCEDURE — 99211 OFF/OP EST MAY X REQ PHY/QHP: CPT

## 2024-12-06 PROCEDURE — 1123F ACP DISCUSS/DSCN MKR DOCD: CPT | Performed by: INTERNAL MEDICINE

## 2024-12-06 PROCEDURE — 1159F MED LIST DOCD IN RCRD: CPT | Performed by: INTERNAL MEDICINE

## 2024-12-06 ASSESSMENT — PATIENT HEALTH QUESTIONNAIRE - PHQ9
1. LITTLE INTEREST OR PLEASURE IN DOING THINGS: NOT AT ALL
SUM OF ALL RESPONSES TO PHQ QUESTIONS 1-9: 0
2. FEELING DOWN, DEPRESSED OR HOPELESS: NOT AT ALL
SUM OF ALL RESPONSES TO PHQ9 QUESTIONS 1 & 2: 0
SUM OF ALL RESPONSES TO PHQ QUESTIONS 1-9: 0

## 2024-12-06 NOTE — PROGRESS NOTES
MA Rooming Questions  Patient: Luci Coleman  MRN: [id number]    Date: 12/6/2024        1. Do you have any new issues?   no         2. Do you need any refills on medications?    no    3. Have you had any imaging done since your last visit?   no    4. Have you been hospitalized or seen in the emergency room since your last visit here?   no    5. Did the patient have a depression screening completed today? Yes    PHQ-9 Total Score: 0 (12/6/2024  3:24 PM)       PHQ-9 Given to (if applicable):               PHQ-9 Score (if applicable):                     [] Positive     []  Negative              Does question #9 need addressed (if applicable)                     [] Yes    []  No               Quin Addison CMA      
antihypertensives         Continue other medical care.    Thank you for letting us be part of the care and will follow along.    Discussed above findings and plan with her and she voiced understanding.  Answered all her questions.    Discussed healthy lifestyle including healthy diet, regular exercise as tolerated.  Also discussed importance of being up-to-date with age-appropriate screening tools.    Recommend follow-up with primary care physician and other specialists.    Please do not hesitate to contact us if you need further information.    Return to clinic 3 months or earlier if new Sx     This note is created with the assistance of a speech-recognition program. While intending to generate a document that accurately reflects the content of the visit, no guarantee can be provided that every mistake has been identified and corrected by editing.    Portions of this note are copied forward from previous clinic note.  Interval history, ROS, physical exam, assessment and plan has been reviewed and updated for accuracy by this provider.    Time Spent with patient,  for face to face, exam, education, discussing treatment options, reviewing imaging, reviewing labs, decision making, Pre-charting, counseling and documenting today's visit, 25 mins.     Radha WILBURN am scribing for and in the presence of Humaira Potts MD. 12/6/24/8:49 AM EST

## 2025-04-04 ENCOUNTER — HOSPITAL ENCOUNTER (OUTPATIENT)
Dept: INFUSION THERAPY | Age: 85
Discharge: HOME | End: 2025-04-04
Payer: MEDICARE

## 2025-04-04 DIAGNOSIS — D64.9 ANEMIA, UNSPECIFIED TYPE: ICD-10-CM

## 2025-04-04 LAB
ALBUMIN SERPL-MCNC: 4.5 G/DL (ref 3.4–5)
ALBUMIN/GLOB SERPL: 1.6 {RATIO} (ref 1.1–2.2)
ALP SERPL-CCNC: 74 U/L (ref 40–129)
ALT SERPL-CCNC: 27 U/L (ref 10–40)
ANION GAP SERPL CALCULATED.3IONS-SCNC: 14 MMOL/L (ref 9–17)
AST SERPL-CCNC: 31 U/L (ref 15–37)
BASOPHILS # BLD: 0.03 K/UL
BASOPHILS NFR BLD: 0 % (ref 0–1)
BILIRUB SERPL-MCNC: 0.4 MG/DL (ref 0–1)
BUN SERPL-MCNC: 31 MG/DL (ref 7–20)
CALCIUM SERPL-MCNC: 9.5 MG/DL (ref 8.3–10.6)
CHLORIDE SERPL-SCNC: 102 MMOL/L (ref 99–110)
CO2 SERPL-SCNC: 27 MMOL/L (ref 21–32)
CREAT SERPL-MCNC: 1.5 MG/DL (ref 0.6–1.2)
EOSINOPHIL # BLD: 0.2 K/UL
EOSINOPHILS RELATIVE PERCENT: 2 % (ref 0–3)
ERYTHROCYTE [DISTWIDTH] IN BLOOD BY AUTOMATED COUNT: 15.6 % (ref 11.7–14.9)
FERRITIN SERPL-MCNC: 264 NG/ML (ref 15–150)
FOLATE SERPL-MCNC: 17.1 NG/ML (ref 4.8–24.2)
GFR, ESTIMATED: 34 ML/MIN/1.73M2
GLUCOSE SERPL-MCNC: 120 MG/DL (ref 74–99)
HCT VFR BLD AUTO: 37.1 % (ref 37–47)
HGB BLD-MCNC: 11.3 G/DL (ref 12.5–16)
IRON SATN MFR SERPL: 14 % (ref 15–50)
IRON SERPL-MCNC: 42 UG/DL (ref 37–145)
LYMPHOCYTES NFR BLD: 1.44 K/UL
LYMPHOCYTES RELATIVE PERCENT: 16 % (ref 24–44)
MCH RBC QN AUTO: 27.8 PG (ref 27–31)
MCHC RBC AUTO-ENTMCNC: 30.5 G/DL (ref 32–36)
MCV RBC AUTO: 91.4 FL (ref 78–100)
MONOCYTES NFR BLD: 0.95 K/UL
MONOCYTES NFR BLD: 11 % (ref 0–4)
NEUTROPHILS NFR BLD: 71 % (ref 36–66)
NEUTS SEG NFR BLD: 6.29 K/UL
PLATELET # BLD AUTO: 292 K/UL (ref 140–440)
PMV BLD AUTO: 10.4 FL (ref 7.5–11.1)
POTASSIUM SERPL-SCNC: 4.2 MMOL/L (ref 3.5–5.1)
PROT SERPL-MCNC: 7.4 G/DL (ref 6.4–8.2)
RBC # BLD AUTO: 4.06 M/UL (ref 4.2–5.4)
SODIUM SERPL-SCNC: 143 MMOL/L (ref 136–145)
TIBC SERPL-MCNC: 292 UG/DL (ref 260–445)
UNSATURATED IRON BINDING CAPACITY: 250 UG/DL (ref 110–370)
VIT B12 SERPL-MCNC: >2000 PG/ML (ref 211–911)
WBC OTHER # BLD: 8.9 K/UL (ref 4–10.5)

## 2025-04-04 PROCEDURE — 82746 ASSAY OF FOLIC ACID SERUM: CPT

## 2025-04-04 PROCEDURE — 80053 COMPREHEN METABOLIC PANEL: CPT

## 2025-04-04 PROCEDURE — 83540 ASSAY OF IRON: CPT

## 2025-04-04 PROCEDURE — 82607 VITAMIN B-12: CPT

## 2025-04-04 PROCEDURE — 85025 COMPLETE CBC W/AUTO DIFF WBC: CPT

## 2025-04-04 PROCEDURE — 36415 COLL VENOUS BLD VENIPUNCTURE: CPT

## 2025-04-04 PROCEDURE — 83550 IRON BINDING TEST: CPT

## 2025-04-04 PROCEDURE — 82728 ASSAY OF FERRITIN: CPT

## 2025-04-11 ENCOUNTER — HOSPITAL ENCOUNTER (OUTPATIENT)
Dept: INFUSION THERAPY | Age: 85
Discharge: HOME | End: 2025-04-11
Payer: MEDICARE

## 2025-04-11 ENCOUNTER — OFFICE VISIT (OUTPATIENT)
Dept: ONCOLOGY | Age: 85
End: 2025-04-11
Payer: MEDICARE

## 2025-04-11 VITALS
HEART RATE: 88 BPM | DIASTOLIC BLOOD PRESSURE: 103 MMHG | TEMPERATURE: 98.8 F | BODY MASS INDEX: 16.73 KG/M2 | OXYGEN SATURATION: 100 % | SYSTOLIC BLOOD PRESSURE: 180 MMHG | HEIGHT: 64 IN | WEIGHT: 98 LBS

## 2025-04-11 DIAGNOSIS — D64.9 ANEMIA, UNSPECIFIED TYPE: Primary | ICD-10-CM

## 2025-04-11 PROCEDURE — 99213 OFFICE O/P EST LOW 20 MIN: CPT | Performed by: INTERNAL MEDICINE

## 2025-04-11 PROCEDURE — 1126F AMNT PAIN NOTED NONE PRSNT: CPT | Performed by: INTERNAL MEDICINE

## 2025-04-11 PROCEDURE — 1123F ACP DISCUSS/DSCN MKR DOCD: CPT | Performed by: INTERNAL MEDICINE

## 2025-04-11 PROCEDURE — 99212 OFFICE O/P EST SF 10 MIN: CPT

## 2025-04-11 PROCEDURE — 1159F MED LIST DOCD IN RCRD: CPT | Performed by: INTERNAL MEDICINE

## 2025-04-11 RX ORDER — LEVOTHYROXINE SODIUM 112 UG/1
112 TABLET ORAL DAILY
COMMUNITY
Start: 2025-01-13

## 2025-04-11 RX ORDER — LOSARTAN POTASSIUM 25 MG/1
25 TABLET ORAL DAILY
COMMUNITY
Start: 2025-01-09

## 2025-04-11 ASSESSMENT — PATIENT HEALTH QUESTIONNAIRE - PHQ9
SUM OF ALL RESPONSES TO PHQ QUESTIONS 1-9: 0
SUM OF ALL RESPONSES TO PHQ QUESTIONS 1-9: 0
2. FEELING DOWN, DEPRESSED OR HOPELESS: NOT AT ALL
SUM OF ALL RESPONSES TO PHQ QUESTIONS 1-9: 0
1. LITTLE INTEREST OR PLEASURE IN DOING THINGS: NOT AT ALL
SUM OF ALL RESPONSES TO PHQ QUESTIONS 1-9: 0

## 2025-04-11 NOTE — PROGRESS NOTES
Glenna Salinas CMA  MA Rooming Questions  Patient: Luci Coleman  MRN: [id number]    Date: 4/11/2025        1. Do you have any new issues?   no         2. Do you need any refills on medications?    no    3. Have you had any imaging done since your last visit?   yes - labs 4/4    4. Have you been hospitalized or seen in the emergency room since your last visit here?   no    5. Did the patient have a depression screening completed today? Yes    PHQ-9 Total Score: 0 (4/11/2025  3:24 PM)       PHQ-9 Given to (if applicable):               PHQ-9 Score (if applicable):                     [] Positive     []  Negative              Does question #9 need addressed (if applicable)                     [] Yes    []  No               Glenna Salinas CMA    
MCV of 91.8 and platelets of 292CMP with sodium of 143 potassium of 4.2 BUN of 31 creatinine 1.5 ferritin of 264 iron saturations of 14% B12 more than 2000 folic acid 17.1    Past Medical History:     CHF, CKD, hypertension, hyperlipidemia                                                             Past Surgery History:    None                                                                            Social History:   Lives with her .  Is very active.  Currently unemployed.  No history of smoking tobacco, alcohol abuse or any other illicit drug abuse                                                                                                        Family History:    No history of leukemia lymphoma or any other blood dyscrasia.  No history of any malignancy.                                                                                                Allergies   Allergen Reactions    Ceclor [Cefaclor] Hives       Current Outpatient Medications on File Prior to Visit   Medication Sig Dispense Refill    losartan (COZAAR) 25 MG tablet Take 1 tablet by mouth daily      levothyroxine (SYNTHROID) 112 MCG tablet Take 1 tablet by mouth daily      diphenhydrAMINE HCl (BENADRYL ALLERGY PO) Take by mouth      acetaminophen (TYLENOL) 500 MG tablet Take 1 tablet by mouth every 6 hours as needed for Pain      torsemide (DEMADEX) 20 MG tablet Take 1 tablet by mouth every other day 15 tablet 0    potassium chloride (KLOR-CON M) 10 MEQ extended release tablet Take 2 tablets by mouth every other day 30 tablet 3    pantoprazole (PROTONIX) 40 MG tablet Take 1 tablet by mouth every morning (before breakfast) 90 tablet 1    metoprolol tartrate (LOPRESSOR) 25 MG tablet Take 1 tablet by mouth 2 times daily 60 tablet 3    atorvastatin (LIPITOR) 20 MG tablet Take 1 tablet by mouth daily       No current facility-administered medications on file prior to visit.   Interval history:4/11/25: She arrived with her son to the clinic

## 2025-05-14 ENCOUNTER — HOSPITAL ENCOUNTER (OUTPATIENT)
Age: 85
Discharge: HOME | End: 2025-05-14
Payer: MEDICARE

## 2025-05-14 LAB
ALBUMIN SERPL-MCNC: 4.2 G/DL (ref 3.4–5)
ANION GAP SERPL CALCULATED.3IONS-SCNC: 13 MMOL/L (ref 9–17)
BACTERIA URNS QL MICRO: ABNORMAL
BILIRUB UR QL STRIP: NEGATIVE
BUN SERPL-MCNC: 19 MG/DL (ref 7–20)
CALCIUM SERPL-MCNC: 9.6 MG/DL (ref 8.3–10.6)
CHLORIDE SERPL-SCNC: 106 MMOL/L (ref 99–110)
CLARITY UR: CLEAR
CO2 SERPL-SCNC: 24 MMOL/L (ref 21–32)
COLOR UR: YELLOW
CREAT SERPL-MCNC: 1.2 MG/DL (ref 0.6–1.2)
CREAT UR-MCNC: 65.1 MG/DL (ref 28–217)
EPI CELLS #/AREA URNS HPF: 1 /HPF
GFR, ESTIMATED: 45 ML/MIN/1.73M2
GLUCOSE SERPL-MCNC: 108 MG/DL (ref 74–99)
GLUCOSE UR STRIP-MCNC: NEGATIVE MG/DL
HGB UR QL STRIP.AUTO: ABNORMAL
KETONES UR STRIP-MCNC: NEGATIVE MG/DL
LEUKOCYTE ESTERASE UR QL STRIP: ABNORMAL
MAGNESIUM SERPL-MCNC: 2.1 MG/DL (ref 1.8–2.4)
MUCOUS THREADS URNS QL MICRO: ABNORMAL
NITRITE UR QL STRIP: NEGATIVE
PH UR STRIP: 6 [PH] (ref 5–8)
PHOSPHATE SERPL-MCNC: 3.5 MG/DL (ref 2.5–4.9)
POTASSIUM SERPL-SCNC: 3.7 MMOL/L (ref 3.5–5.1)
PROT UR STRIP-MCNC: 30 MG/DL
RBC #/AREA URNS HPF: 1 /HPF (ref 0–2)
SODIUM SERPL-SCNC: 143 MMOL/L (ref 136–145)
SP GR UR STRIP: 1.02 (ref 1–1.03)
TOTAL PROTEIN, URINE: 38 MG/DL
URINE TOTAL PROTEIN CREATININE RATIO: 0.58 (ref 0–0.2)
UROBILINOGEN UR STRIP-ACNC: 0.2 EU/DL (ref 0–1)
WBC #/AREA URNS HPF: 9 /HPF (ref 0–5)

## 2025-05-14 PROCEDURE — 82040 ASSAY OF SERUM ALBUMIN: CPT

## 2025-05-14 PROCEDURE — 81001 URINALYSIS AUTO W/SCOPE: CPT

## 2025-05-14 PROCEDURE — 84156 ASSAY OF PROTEIN URINE: CPT

## 2025-05-14 PROCEDURE — 82570 ASSAY OF URINE CREATININE: CPT

## 2025-05-14 PROCEDURE — 80048 BASIC METABOLIC PNL TOTAL CA: CPT

## 2025-05-14 PROCEDURE — 84100 ASSAY OF PHOSPHORUS: CPT

## 2025-05-14 PROCEDURE — 83735 ASSAY OF MAGNESIUM: CPT

## 2025-05-20 PROBLEM — N18.31 STAGE 3A CHRONIC KIDNEY DISEASE (HCC): Status: ACTIVE | Noted: 2025-05-20

## 2025-05-20 PROBLEM — I10 PRIMARY HYPERTENSION: Status: ACTIVE | Noted: 2025-05-20

## 2025-07-14 ENCOUNTER — HOSPITAL ENCOUNTER (OUTPATIENT)
Dept: INFUSION THERAPY | Age: 85
Discharge: HOME | End: 2025-07-14
Payer: MEDICARE

## 2025-07-14 DIAGNOSIS — D64.9 ANEMIA, UNSPECIFIED TYPE: ICD-10-CM

## 2025-07-14 LAB
ALBUMIN SERPL-MCNC: 4.1 G/DL (ref 3.4–5)
ALBUMIN/GLOB SERPL: 1.4 {RATIO} (ref 1.1–2.2)
ALP SERPL-CCNC: 68 U/L (ref 40–129)
ALT SERPL-CCNC: 17 U/L (ref 10–40)
ANION GAP SERPL CALCULATED.3IONS-SCNC: 12 MMOL/L (ref 9–17)
AST SERPL-CCNC: 24 U/L (ref 15–37)
BASOPHILS # BLD: 0.03 K/UL
BASOPHILS NFR BLD: 0 % (ref 0–1)
BILIRUB SERPL-MCNC: 0.6 MG/DL (ref 0–1)
BUN SERPL-MCNC: 24 MG/DL (ref 7–20)
CALCIUM SERPL-MCNC: 9.6 MG/DL (ref 8.3–10.6)
CHLORIDE SERPL-SCNC: 109 MMOL/L (ref 99–110)
CO2 SERPL-SCNC: 23 MMOL/L (ref 21–32)
CREAT SERPL-MCNC: 1.3 MG/DL (ref 0.6–1.2)
EOSINOPHIL # BLD: 0.26 K/UL
EOSINOPHILS RELATIVE PERCENT: 4 % (ref 0–3)
ERYTHROCYTE [DISTWIDTH] IN BLOOD BY AUTOMATED COUNT: 14.9 % (ref 11.7–14.9)
FERRITIN SERPL-MCNC: 319 NG/ML (ref 15–150)
FOLATE SERPL-MCNC: 18.6 NG/ML (ref 4.8–24.2)
GFR, ESTIMATED: 39 ML/MIN/1.73M2
GLUCOSE SERPL-MCNC: 96 MG/DL (ref 74–99)
HCT VFR BLD AUTO: 33.8 % (ref 37–47)
HGB BLD-MCNC: 10.6 G/DL (ref 12.5–16)
IRON SATN MFR SERPL: 26 % (ref 15–50)
IRON SERPL-MCNC: 61 UG/DL (ref 37–145)
LYMPHOCYTES NFR BLD: 1.55 K/UL
LYMPHOCYTES RELATIVE PERCENT: 23 % (ref 24–44)
MCH RBC QN AUTO: 29 PG (ref 27–31)
MCHC RBC AUTO-ENTMCNC: 31.4 G/DL (ref 32–36)
MCV RBC AUTO: 92.6 FL (ref 78–100)
MONOCYTES NFR BLD: 0.8 K/UL
MONOCYTES NFR BLD: 12 % (ref 0–5)
NEUTROPHILS NFR BLD: 62 % (ref 36–66)
NEUTS SEG NFR BLD: 4.26 K/UL
PLATELET # BLD AUTO: 240 K/UL (ref 140–440)
PMV BLD AUTO: 10.3 FL (ref 7.5–11.1)
POTASSIUM SERPL-SCNC: 4.1 MMOL/L (ref 3.5–5.1)
PROT SERPL-MCNC: 6.9 G/DL (ref 6.4–8.2)
RBC # BLD AUTO: 3.65 M/UL (ref 4.2–5.4)
SODIUM SERPL-SCNC: 144 MMOL/L (ref 136–145)
TIBC SERPL-MCNC: 232 UG/DL (ref 260–445)
UNSATURATED IRON BINDING CAPACITY: 171 UG/DL (ref 110–370)
VIT B12 SERPL-MCNC: >2000 PG/ML (ref 211–911)
WBC OTHER # BLD: 6.9 K/UL (ref 4–10.5)

## 2025-07-14 PROCEDURE — 83550 IRON BINDING TEST: CPT

## 2025-07-14 PROCEDURE — 80053 COMPREHEN METABOLIC PANEL: CPT

## 2025-07-14 PROCEDURE — 83540 ASSAY OF IRON: CPT

## 2025-07-14 PROCEDURE — 82607 VITAMIN B-12: CPT

## 2025-07-14 PROCEDURE — 82728 ASSAY OF FERRITIN: CPT

## 2025-07-14 PROCEDURE — 85025 COMPLETE CBC W/AUTO DIFF WBC: CPT

## 2025-07-14 PROCEDURE — 82746 ASSAY OF FOLIC ACID SERUM: CPT

## 2025-07-14 PROCEDURE — 36415 COLL VENOUS BLD VENIPUNCTURE: CPT

## 2025-07-15 ENCOUNTER — CLINICAL DOCUMENTATION (OUTPATIENT)
Dept: ONCOLOGY | Age: 85
End: 2025-07-15

## 2025-07-15 NOTE — PROGRESS NOTES
Lab results from 07/14/2025 reviewed. Hgb is stable. Please let her know that hemoglobin is stable.  Physician recommending that patient can stop oral iron and B12 supplements if taking. Will continue to monitor for now. Attempted to call patient @ [phone] to notify; however, no answer. VM left with instruction to call office should patient/daughter have any questions.

## (undated) DEVICE — NEEDLE SCLERO 23GA L240CM OD064MM ID032MM CLR INTERJECT

## (undated) DEVICE — ENDOSCOPY KIT: Brand: MEDLINE INDUSTRIES, INC.

## (undated) DEVICE — SNARE VASC L240CM LOOP W10MM SHTH DIA2.4MM RND STIFF CLD

## (undated) DEVICE — FORCEPS BX L240CM JAW DIA2.2MM RAD JAW 4 HOT DISP

## (undated) DEVICE — AGENT LIFTING 10 CC SUBMUCOSAL INJ SOLUTION STRL BLUEBOOST

## (undated) DEVICE — FORCEPS BX 240CM JAW 3.2MM L CAP NDL MIC MESH TTH M00513372

## (undated) DEVICE — FORCEPS BX L240CM JAW DIA2.8MM L CAP W/ NDL MIC MESH TOOTH

## (undated) DEVICE — SNARE ENDOSCP L240CM OD2.4MM LOOP W15MM RND INSUL STIFF

## (undated) DEVICE — Z DISCONTINUED (USE MFG CAT MVABO)  TUBING GAS SAMPLING STD 6.5 FT FEMALE CONN SMRT CAPNOLINE